# Patient Record
Sex: FEMALE | Race: WHITE | NOT HISPANIC OR LATINO | Employment: OTHER | URBAN - METROPOLITAN AREA
[De-identification: names, ages, dates, MRNs, and addresses within clinical notes are randomized per-mention and may not be internally consistent; named-entity substitution may affect disease eponyms.]

---

## 2018-02-27 ENCOUNTER — HOSPITAL ENCOUNTER (OUTPATIENT)
Dept: FAMILY MEDICINE CLINIC | Facility: CLINIC | Age: 48
Setting detail: SPECIMEN
Discharge: HOME | End: 2018-02-27
Attending: NURSE PRACTITIONER | Admitting: NURSE PRACTITIONER

## 2018-02-27 LAB
BASOPHILS # BLD AUTO: 0.1 10*3/UL (ref 0–0.2)
BASOPHILS NFR BLD AUTO: 1 % (ref 0–2)
DIFFERENTIAL METHOD BLD: (no result)
EOSINOPHIL # BLD AUTO: 0.2 10*3/UL (ref 0–0.3)
EOSINOPHIL # BLD AUTO: 2 % (ref 0–3)
ERYTHROCYTE [DISTWIDTH] IN BLOOD BY AUTOMATED COUNT: 13 % (ref 11.5–14.5)
HCT VFR BLD AUTO: 52.3 % (ref 35–49)
HGB BLD-MCNC: 18 G/DL (ref 12–15)
LYMPHOCYTES # BLD AUTO: 3.9 10*3/UL (ref 0.8–4.8)
LYMPHOCYTES NFR BLD AUTO: 34 % (ref 18–42)
MCH RBC QN AUTO: 30.3 PG (ref 26–32)
MCHC RBC AUTO-ENTMCNC: 34.3 G/DL (ref 32–36)
MCV RBC AUTO: 88.4 FL (ref 80–94)
MONOCYTES # BLD AUTO: 0.7 10*3/UL (ref 0.1–1.3)
MONOCYTES NFR BLD AUTO: 6 % (ref 2–11)
NEUTROPHILS # BLD AUTO: 6.7 10*3/UL (ref 2.3–8.6)
NEUTROPHILS NFR BLD AUTO: 57 % (ref 50–75)
NRBC BLD AUTO-RTO: 0 /100{WBCS}
NRBC/RBC NFR BLD MANUAL: 0 10*3/UL
PLATELET # BLD AUTO: 195 10*3/UL (ref 150–450)
PMV BLD AUTO: 10.3 FL (ref 7.4–10.4)
RBC # BLD AUTO: 5.92 10*6/UL (ref 4–5.4)
WBC # BLD AUTO: 11.7 10*3/UL (ref 4.5–11.5)

## 2019-02-11 ENCOUNTER — HOSPITAL ENCOUNTER (OUTPATIENT)
Dept: LAB | Facility: HOSPITAL | Age: 49
Discharge: HOME | End: 2019-02-11
Attending: FAMILY MEDICINE | Admitting: FAMILY MEDICINE

## 2019-02-11 LAB
ALBUMIN SERPL-MCNC: 3.2 G/DL (ref 3.5–4.8)
ALBUMIN/GLOB SERPL: 1 {RATIO} (ref 1–1.7)
ALP SERPL-CCNC: 63 IU/L (ref 32–91)
ALT SERPL-CCNC: 25 IU/L (ref 14–54)
ANION GAP SERPL CALC-SCNC: 15.9 MMOL/L (ref 10–20)
AST SERPL-CCNC: 30 IU/L (ref 15–41)
BASOPHILS # BLD AUTO: 0 10*3/UL (ref 0–0.2)
BASOPHILS NFR BLD AUTO: 1 % (ref 0–2)
BILIRUB SERPL-MCNC: 0.5 MG/DL (ref 0.3–1.2)
BUN SERPL-MCNC: 8 MG/DL (ref 8–20)
BUN/CREAT SERPL: 13.3 (ref 5.4–26.2)
CALCIUM SERPL-MCNC: 9.2 MG/DL (ref 8.9–10.3)
CHLORIDE SERPL-SCNC: 97 MMOL/L (ref 101–111)
CHOLEST SERPL-MCNC: 232 MG/DL
CHOLEST/HDLC SERPL: 7 {RATIO}
CONV CO2: 20 MMOL/L (ref 22–32)
CONV LDL CHOLESTEROL DIRECT: 160 MG/DL (ref 0–100)
CONV TOTAL PROTEIN: 6.4 G/DL (ref 6.1–7.9)
CREAT UR-MCNC: 0.6 MG/DL (ref 0.4–1)
DIFFERENTIAL METHOD BLD: (no result)
EOSINOPHIL # BLD AUTO: 0.2 10*3/UL (ref 0–0.3)
EOSINOPHIL # BLD AUTO: 3 % (ref 0–3)
ERYTHROCYTE [DISTWIDTH] IN BLOOD BY AUTOMATED COUNT: 12.5 % (ref 11.5–14.5)
GLOBULIN UR ELPH-MCNC: 3.2 G/DL (ref 2.5–3.8)
GLUCOSE SERPL-MCNC: 273 MG/DL (ref 65–99)
HCT VFR BLD AUTO: 49.9 % (ref 35–49)
HDLC SERPL-MCNC: 33 MG/DL
HGB BLD-MCNC: 17 G/DL (ref 12–15)
LDLC/HDLC SERPL: 4.8 {RATIO}
LIPID INTERPRETATION: ABNORMAL
LYMPHOCYTES # BLD AUTO: 3 10*3/UL (ref 0.8–4.8)
LYMPHOCYTES NFR BLD AUTO: 38 % (ref 18–42)
MCH RBC QN AUTO: 29.4 PG (ref 26–32)
MCHC RBC AUTO-ENTMCNC: 34.1 G/DL (ref 32–36)
MCV RBC AUTO: 86.2 FL (ref 80–94)
MONOCYTES # BLD AUTO: 0.3 10*3/UL (ref 0.1–1.3)
MONOCYTES NFR BLD AUTO: 4 % (ref 2–11)
NEUTROPHILS # BLD AUTO: 4.5 10*3/UL (ref 2.3–8.6)
NEUTROPHILS NFR BLD AUTO: 54 % (ref 50–75)
NRBC BLD AUTO-RTO: 0 /100{WBCS}
NRBC/RBC NFR BLD MANUAL: 0 10*3/UL
PLATELET # BLD AUTO: 178 10*3/UL (ref 150–450)
PMV BLD AUTO: 10.4 FL (ref 7.4–10.4)
POTASSIUM SERPL-SCNC: 3.9 MMOL/L (ref 3.6–5.1)
RBC # BLD AUTO: 5.79 10*6/UL (ref 4–5.4)
SODIUM SERPL-SCNC: 129 MMOL/L (ref 136–144)
TRIGL SERPL-MCNC: 272 MG/DL
VLDLC SERPL CALC-MCNC: 38.9 MG/DL
WBC # BLD AUTO: 8.1 10*3/UL (ref 4.5–11.5)

## 2019-07-19 ENCOUNTER — TELEPHONE (OUTPATIENT)
Dept: FAMILY MEDICINE CLINIC | Facility: CLINIC | Age: 49
End: 2019-07-19

## 2019-07-19 NOTE — TELEPHONE ENCOUNTER
"FROM THE PHARMACY: \"Patient requests new Rx: New Rx for basaglar, pt is injecting 28 units daily and it's too soon to fill.\"  "

## 2019-07-21 RX ORDER — CYCLOBENZAPRINE HCL 10 MG
TABLET ORAL EVERY 24 HOURS
COMMUNITY
Start: 2019-02-08 | End: 2019-08-22 | Stop reason: SDUPTHER

## 2019-07-21 RX ORDER — HYDROXYZINE HYDROCHLORIDE 25 MG/1
TABLET, FILM COATED ORAL
COMMUNITY
Start: 2018-09-13 | End: 2019-12-10 | Stop reason: SDUPTHER

## 2019-07-21 RX ORDER — ALBUTEROL SULFATE 90 UG/1
AEROSOL, METERED RESPIRATORY (INHALATION)
COMMUNITY
Start: 2018-02-27 | End: 2021-02-09

## 2019-07-21 RX ORDER — NEBIVOLOL 10 MG/1
10 TABLET ORAL DAILY
COMMUNITY
Start: 2018-02-27

## 2019-07-21 RX ORDER — QUETIAPINE FUMARATE 200 MG/1
TABLET, FILM COATED ORAL EVERY 24 HOURS
COMMUNITY
Start: 2018-09-13 | End: 2020-04-17 | Stop reason: SDUPTHER

## 2019-07-21 RX ORDER — DOCUSATE SODIUM 100 MG/1
100 CAPSULE, LIQUID FILLED ORAL DAILY
COMMUNITY
Start: 2019-02-08

## 2019-07-21 RX ORDER — OMEPRAZOLE 40 MG/1
CAPSULE, DELAYED RELEASE ORAL
Status: ON HOLD | COMMUNITY
Start: 2018-02-27 | End: 2021-02-09

## 2019-08-22 RX ORDER — CYCLOBENZAPRINE HCL 10 MG
10 TABLET ORAL 2 TIMES DAILY PRN
Qty: 60 TABLET | Refills: 2 | Status: SHIPPED | OUTPATIENT
Start: 2019-08-22 | End: 2020-01-27 | Stop reason: SDUPTHER

## 2019-12-10 RX ORDER — HYDROXYZINE HYDROCHLORIDE 25 MG/1
TABLET, FILM COATED ORAL
Qty: 60 TABLET | Refills: 2 | Status: SHIPPED | OUTPATIENT
Start: 2019-12-10

## 2020-01-28 RX ORDER — CYCLOBENZAPRINE HCL 10 MG
10 TABLET ORAL 2 TIMES DAILY PRN
Qty: 60 TABLET | Refills: 2 | Status: SHIPPED | OUTPATIENT
Start: 2020-01-28 | End: 2021-02-09

## 2020-03-20 DIAGNOSIS — E11.9 DIABETES MELLITUS WITHOUT COMPLICATION (HCC): Primary | ICD-10-CM

## 2020-04-20 RX ORDER — QUETIAPINE FUMARATE 200 MG/1
TABLET, FILM COATED ORAL
Qty: 60 TABLET | Refills: 0 | Status: ON HOLD | OUTPATIENT
Start: 2020-04-20 | End: 2021-02-09

## 2020-06-10 ENCOUNTER — HOSPITAL ENCOUNTER (EMERGENCY)
Facility: HOSPITAL | Age: 50
Discharge: HOME | End: 2020-06-10
Attending: EMERGENCY MEDICINE | Admitting: EMERGENCY MEDICINE

## 2020-06-10 VITALS
HEART RATE: 73 BPM | BODY MASS INDEX: 50.02 KG/M2 | HEIGHT: 64 IN | RESPIRATION RATE: 16 BRPM | DIASTOLIC BLOOD PRESSURE: 80 MMHG | WEIGHT: 293 LBS | SYSTOLIC BLOOD PRESSURE: 126 MMHG | TEMPERATURE: 98.1 F | OXYGEN SATURATION: 99 %

## 2020-06-10 DIAGNOSIS — R73.9 HYPERGLYCEMIA: Primary | ICD-10-CM

## 2020-06-10 LAB
ALBUMIN SERPL-MCNC: 3.6 G/DL (ref 3.5–5.2)
ALBUMIN/GLOB SERPL: 1.1 G/DL
ALP SERPL-CCNC: 71 U/L (ref 39–117)
ALT SERPL W P-5'-P-CCNC: 43 U/L (ref 1–33)
ANION GAP SERPL CALCULATED.3IONS-SCNC: 14 MMOL/L (ref 5–15)
AST SERPL-CCNC: 35 U/L (ref 1–32)
BACTERIA UR QL AUTO: ABNORMAL /HPF
BILIRUB SERPL-MCNC: 0.6 MG/DL (ref 0.2–1.2)
BILIRUB UR QL STRIP: NEGATIVE
BUN BLD-MCNC: 11 MG/DL (ref 6–20)
BUN BLD-MCNC: ABNORMAL MG/DL
BUN/CREAT SERPL: ABNORMAL
CALCIUM SPEC-SCNC: 9.4 MG/DL (ref 8.6–10.5)
CHLORIDE SERPL-SCNC: 95 MMOL/L (ref 98–107)
CLARITY UR: CLEAR
CO2 SERPL-SCNC: 21 MMOL/L (ref 22–29)
COLOR UR: YELLOW
CREAT BLD-MCNC: 0.66 MG/DL (ref 0.57–1)
DEPRECATED RDW RBC AUTO: 39.8 FL (ref 37–54)
EOSINOPHIL # BLD MANUAL: 0.25 10*3/MM3 (ref 0–0.4)
EOSINOPHIL NFR BLD MANUAL: 2 % (ref 0.3–6.2)
ERYTHROCYTE [DISTWIDTH] IN BLOOD BY AUTOMATED COUNT: 13.2 % (ref 12.3–15.4)
GFR SERPL CREATININE-BSD FRML MDRD: 94 ML/MIN/1.73
GLOBULIN UR ELPH-MCNC: 3.3 GM/DL
GLUCOSE BLD-MCNC: 346 MG/DL (ref 65–99)
GLUCOSE BLDC GLUCOMTR-MCNC: 295 MG/DL (ref 70–105)
GLUCOSE BLDC GLUCOMTR-MCNC: 300 MG/DL (ref 70–105)
GLUCOSE BLDC GLUCOMTR-MCNC: 329 MG/DL (ref 70–105)
GLUCOSE BLDC GLUCOMTR-MCNC: 337 MG/DL (ref 70–105)
GLUCOSE UR STRIP-MCNC: ABNORMAL MG/DL
HCT VFR BLD AUTO: 45.8 % (ref 34–46.6)
HGB BLD-MCNC: 16 G/DL (ref 12–15.9)
HGB UR QL STRIP.AUTO: ABNORMAL
HYALINE CASTS UR QL AUTO: ABNORMAL /LPF
KETONES UR QL STRIP: NEGATIVE
LEUKOCYTE ESTERASE UR QL STRIP.AUTO: NEGATIVE
LYMPHOCYTES # BLD MANUAL: 4.45 10*3/MM3 (ref 0.7–3.1)
LYMPHOCYTES NFR BLD MANUAL: 35 % (ref 19.6–45.3)
LYMPHOCYTES NFR BLD MANUAL: 4 % (ref 5–12)
MCH RBC QN AUTO: 30.1 PG (ref 26.6–33)
MCHC RBC AUTO-ENTMCNC: 35 G/DL (ref 31.5–35.7)
MCV RBC AUTO: 86.1 FL (ref 79–97)
MONOCYTES # BLD AUTO: 0.51 10*3/MM3 (ref 0.1–0.9)
NEUTROPHILS # BLD AUTO: 7.49 10*3/MM3 (ref 1.7–7)
NEUTROPHILS NFR BLD MANUAL: 56 % (ref 42.7–76)
NEUTS BAND NFR BLD MANUAL: 3 % (ref 0–5)
NITRITE UR QL STRIP: NEGATIVE
NT-PROBNP SERPL-MCNC: 60 PG/ML (ref 5–900)
PH UR STRIP.AUTO: 6 [PH] (ref 5–8)
PLAT MORPH BLD: NORMAL
PLATELET # BLD AUTO: 172 10*3/MM3 (ref 140–450)
PMV BLD AUTO: 9.3 FL (ref 6–12)
POTASSIUM BLD-SCNC: 3.9 MMOL/L (ref 3.5–5.2)
PROT SERPL-MCNC: 6.9 G/DL (ref 6–8.5)
PROT UR QL STRIP: NEGATIVE
RBC # BLD AUTO: 5.31 10*6/MM3 (ref 3.77–5.28)
RBC # UR: ABNORMAL /HPF
RBC MORPH BLD: NORMAL
REF LAB TEST METHOD: ABNORMAL
SCAN SLIDE: NORMAL
SODIUM BLD-SCNC: 130 MMOL/L (ref 136–145)
SP GR UR STRIP: 1.04 (ref 1–1.03)
SQUAMOUS #/AREA URNS HPF: ABNORMAL /HPF
TROPONIN T SERPL-MCNC: 0.01 NG/ML (ref 0–0.03)
TSH SERPL DL<=0.05 MIU/L-ACNC: 1.94 UIU/ML (ref 0.27–4.2)
UROBILINOGEN UR QL STRIP: ABNORMAL
WBC MORPH BLD: NORMAL
WBC NRBC COR # BLD: 12.7 10*3/MM3 (ref 3.4–10.8)
WBC UR QL AUTO: ABNORMAL /HPF

## 2020-06-10 PROCEDURE — 96360 HYDRATION IV INFUSION INIT: CPT

## 2020-06-10 PROCEDURE — 84484 ASSAY OF TROPONIN QUANT: CPT | Performed by: EMERGENCY MEDICINE

## 2020-06-10 PROCEDURE — 36415 COLL VENOUS BLD VENIPUNCTURE: CPT

## 2020-06-10 PROCEDURE — 83880 ASSAY OF NATRIURETIC PEPTIDE: CPT | Performed by: EMERGENCY MEDICINE

## 2020-06-10 PROCEDURE — 85025 COMPLETE CBC W/AUTO DIFF WBC: CPT | Performed by: EMERGENCY MEDICINE

## 2020-06-10 PROCEDURE — 82962 GLUCOSE BLOOD TEST: CPT

## 2020-06-10 PROCEDURE — 85007 BL SMEAR W/DIFF WBC COUNT: CPT | Performed by: EMERGENCY MEDICINE

## 2020-06-10 PROCEDURE — 93005 ELECTROCARDIOGRAM TRACING: CPT | Performed by: EMERGENCY MEDICINE

## 2020-06-10 PROCEDURE — 63710000001 INSULIN REGULAR HUMAN PER 5 UNITS: Performed by: EMERGENCY MEDICINE

## 2020-06-10 PROCEDURE — 81001 URINALYSIS AUTO W/SCOPE: CPT | Performed by: EMERGENCY MEDICINE

## 2020-06-10 PROCEDURE — 99284 EMERGENCY DEPT VISIT MOD MDM: CPT

## 2020-06-10 PROCEDURE — 84443 ASSAY THYROID STIM HORMONE: CPT | Performed by: EMERGENCY MEDICINE

## 2020-06-10 PROCEDURE — 80053 COMPREHEN METABOLIC PANEL: CPT | Performed by: EMERGENCY MEDICINE

## 2020-06-10 RX ORDER — INSULIN GLARGINE 100 [IU]/ML
20 INJECTION, SOLUTION SUBCUTANEOUS NIGHTLY
Qty: 2 PEN | Refills: 0 | Status: SHIPPED | OUTPATIENT
Start: 2020-06-10 | End: 2020-06-17 | Stop reason: SDUPTHER

## 2020-06-10 RX ADMIN — INSULIN HUMAN 10 UNITS: 100 INJECTION, SOLUTION PARENTERAL at 04:58

## 2020-06-10 RX ADMIN — SODIUM CHLORIDE 1000 ML: 900 INJECTION, SOLUTION INTRAVENOUS at 03:59

## 2020-06-10 NOTE — ED NOTES
Pt c/o BLE edema and hyperglycemia for several days. Pt reports she lost her PCP and she has been out of insulin x3 days. Pt states she normally takes 20 units of Braylar insulin every night.      Melva Angelo, SHELLY  06/10/20 0236

## 2020-06-10 NOTE — ED PROVIDER NOTES
Subjective   50-year-old female with diabetes complains of being out of her insulin for 3 days secondary to issues with her insurance with resultant elevated blood sugar and leg swelling.  Patient states she gets leg swelling when her blood sugar elevates.  Patient denies any vomiting, mild diarrhea, no other symptoms.  No fever, no chest pain or shortness of air.  Symptoms are mild to moderate.  Leg swelling is worse with elevated blood sugar per patient.          Review of Systems   Cardiovascular: Positive for leg swelling.   Endocrine:        Elevated blood sugar   All other systems reviewed and are negative.      No past medical history on file.    No Known Allergies    No past surgical history on file.    No family history on file.    Social History     Socioeconomic History   • Marital status: Single     Spouse name: Not on file   • Number of children: Not on file   • Years of education: Not on file   • Highest education level: Not on file           Objective   Physical Exam   Constitutional: She is oriented to person, place, and time.   Obese female in no acute distress   HENT:   Mouth/Throat: Oropharynx is clear and moist.   Eyes: Pupils are equal, round, and reactive to light. Conjunctivae are normal.   Neck: Normal range of motion. Neck supple.   Cardiovascular: Normal rate, regular rhythm, normal heart sounds and intact distal pulses.   Pulmonary/Chest: Effort normal and breath sounds normal.   Abdominal: Soft. Bowel sounds are normal. She exhibits no distension. There is no tenderness.   Musculoskeletal:   Trace pedal edema bilateral lower extremities, symmetric, no calf tenderness, negative Homans   Neurological: She is alert and oriented to person, place, and time.   Motor and sensation intact   Skin: Skin is warm and dry. Capillary refill takes less than 2 seconds.   Psychiatric: She has a normal mood and affect. Her behavior is normal.       Procedures           ED Course  ED Course as of Pop 10  0659   Wed Pop 10, 2020   0658 EKG interpretation: Normal sinus rhythm, rate 81, no acute ST elevation    [JR]      ED Course User Index  [JR] Jw Strong MD                                           MDM  Number of Diagnoses or Management Options  Hyperglycemia:   Diagnosis management comments: Results for orders placed or performed during the hospital encounter of 06/10/20  -Comprehensive Metabolic Panel       Result                      Value             Ref Range           Glucose                     346 (H)           65 - 99 mg/dL       BUN                                                               Creatinine                  0.66              0.57 - 1.00 *       Sodium                      130 (L)           136 - 145 mm*       Potassium                   3.9               3.5 - 5.2 mm*       Chloride                    95 (L)            98 - 107 mmo*       CO2                         21.0 (L)          22.0 - 29.0 *       Calcium                     9.4               8.6 - 10.5 m*       Total Protein               6.9               6.0 - 8.5 g/*       Albumin                     3.60              3.50 - 5.20 *       ALT (SGPT)                  43 (H)            1 - 33 U/L          AST (SGOT)                  35 (H)            1 - 32 U/L          Alkaline Phosphatase        71                39 - 117 U/L        Total Bilirubin             0.6               0.2 - 1.2 mg*       eGFR Non  Amer       94                >60 mL/min/1*       Globulin                    3.3               gm/dL               A/G Ratio                   1.1               g/dL                BUN/Creatinine Ratio                                              Anion Gap                   14.0              5.0 - 15.0 m*  -Troponin       Result                      Value             Ref Range           Troponin T                  0.012             0.000 - 0.03*  -BNP       Result                      Value             Ref Range            proBNP                      60.0              5.0 - 900.0 *  -Urinalysis With Microscopic If Indicated (No Culture) - Urine, Catheter       Result                      Value             Ref Range           Color, UA                   Yellow            Yellow, Straw       Appearance, UA              Clear             Clear               pH, UA                      6.0               5.0 - 8.0           Specific Gravity, UA        1.038 (H)         1.005 - 1.030       Glucose, UA                                   Negative        >=1000 mg/dL (3+) (A)       Ketones, UA                 Negative          Negative            Bilirubin, UA               Negative          Negative            Blood, UA                   Trace (A)         Negative            Protein, UA                 Negative          Negative            Leuk Esterase, UA           Negative          Negative            Nitrite, UA                 Negative          Negative            Urobilinogen, UA            0.2 E.U./dL       0.2 - 1.0 E.*  -TSH       Result                      Value             Ref Range           TSH                         1.940             0.270 - 4.20*  -CBC Auto Differential       Result                      Value             Ref Range           WBC                         12.70 (H)         3.40 - 10.80*       RBC                         5.31 (H)          3.77 - 5.28 *       Hemoglobin                  16.0 (H)          12.0 - 15.9 *       Hematocrit                  45.8              34.0 - 46.6 %       MCV                         86.1              79.0 - 97.0 *       MCH                         30.1              26.6 - 33.0 *       MCHC                        35.0              31.5 - 35.7 *       RDW                         13.2              12.3 - 15.4 %       RDW-SD                      39.8              37.0 - 54.0 *       MPV                         9.3               6.0 - 12.0 fL       Platelets                   172                140 - 450 10*  -Scan Slide       Result                      Value             Ref Range           Scan Slide                                                   -Urinalysis, Microscopic Only - Urine, Catheter       Result                      Value             Ref Range           RBC, UA                     3-5 (A)           None Seen /H*       WBC, UA                     None Seen         None Seen /H*       Bacteria, UA                None Seen         None Seen /H*       Squamous Epithelial Ce*     3-6 (A)           None Seen, 0*       Hyaline Casts, UA           3-6               None Seen /L*       Methodology                                                   Automated Microscopy  -Manual Differential       Result                      Value             Ref Range           Neutrophil %                56.0              42.7 - 76.0 %       Lymphocyte %                35.0              19.6 - 45.3 %       Monocyte %                  4.0 (L)           5.0 - 12.0 %        Eosinophil %                2.0               0.3 - 6.2 %         Bands %                     3.0               0.0 - 5.0 %         Neutrophils Absolute        7.49 (H)          1.70 - 7.00 *       Lymphocytes Absolute        4.45 (H)          0.70 - 3.10 *       Monocytes Absolute          0.51              0.10 - 0.90 *       Eosinophils Absolute        0.25              0.00 - 0.40 *       RBC Morphology              Normal            Normal              WBC Morphology              Normal            Normal              Platelet Morphology         Normal            Normal         -BUN       Result                      Value             Ref Range           BUN                         11                6 - 20 mg/dL   -POC Glucose Once       Result                      Value             Ref Range           Glucose                     329 (H)           70 - 105 mg/*  -POC Glucose Once       Result                      Value             Ref  Range           Glucose                     337 (H)           70 - 105 mg/*  -POC Glucose Once       Result                      Value             Ref Range           Glucose                     300 (H)           70 - 105 mg/*  -POC Glucose Once       Result                      Value             Ref Range           Glucose                     295 (H)           70 - 105 mg/*    Well, vital signs stable, will refill insulin.       Amount and/or Complexity of Data Reviewed  Clinical lab tests: reviewed  Tests in the medicine section of CPT®: reviewed        Final diagnoses:   Hyperglycemia            Jw Srtong MD  06/10/20 0659

## 2020-06-17 RX ORDER — INSULIN GLARGINE 100 [IU]/ML
20 INJECTION, SOLUTION SUBCUTANEOUS NIGHTLY
Qty: 15 ML | Refills: 0 | Status: SHIPPED | OUTPATIENT
Start: 2020-06-17 | End: 2020-06-17

## 2020-06-17 RX ORDER — INSULIN GLARGINE 100 [IU]/ML
20 INJECTION, SOLUTION SUBCUTANEOUS NIGHTLY
Qty: 15 ML | Refills: 0 | Status: SHIPPED | OUTPATIENT
Start: 2020-06-17 | End: 2021-02-09

## 2021-02-09 ENCOUNTER — APPOINTMENT (OUTPATIENT)
Dept: CT IMAGING | Facility: HOSPITAL | Age: 51
End: 2021-02-09

## 2021-02-09 ENCOUNTER — APPOINTMENT (OUTPATIENT)
Dept: GENERAL RADIOLOGY | Facility: HOSPITAL | Age: 51
End: 2021-02-09

## 2021-02-09 ENCOUNTER — HOSPITAL ENCOUNTER (OUTPATIENT)
Facility: HOSPITAL | Age: 51
Discharge: HOME | End: 2021-02-11
Attending: INTERNAL MEDICINE | Admitting: ORTHOPAEDIC SURGERY

## 2021-02-09 DIAGNOSIS — S42.202A CLOSED FRACTURE OF PROXIMAL END OF LEFT HUMERUS, UNSPECIFIED FRACTURE MORPHOLOGY, INITIAL ENCOUNTER: Primary | ICD-10-CM

## 2021-02-09 DIAGNOSIS — R55 NEAR SYNCOPE: ICD-10-CM

## 2021-02-09 DIAGNOSIS — R56.9 SEIZURE (HCC): ICD-10-CM

## 2021-02-09 DIAGNOSIS — M79.602 PAIN OF LEFT UPPER EXTREMITY: ICD-10-CM

## 2021-02-09 DIAGNOSIS — S42.212A CLOSED DISPLACED FRACTURE OF SURGICAL NECK OF LEFT HUMERUS, UNSPECIFIED FRACTURE MORPHOLOGY, INITIAL ENCOUNTER: ICD-10-CM
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
## 2021-02-09 LAB
ALBUMIN SERPL-MCNC: 3.7 G/DL (ref 3.5–5.2)
ALBUMIN/GLOB SERPL: 1.5 G/DL
ALP SERPL-CCNC: 78 U/L (ref 39–117)
ALT SERPL W P-5'-P-CCNC: 18 U/L (ref 1–33)
ANION GAP SERPL CALCULATED.3IONS-SCNC: 12 MMOL/L (ref 5–15)
AST SERPL-CCNC: 20 U/L (ref 1–32)
BASOPHILS # BLD AUTO: 0.1 10*3/MM3 (ref 0–0.2)
BASOPHILS NFR BLD AUTO: 1.2 % (ref 0–1.5)
BILIRUB SERPL-MCNC: 0.4 MG/DL (ref 0–1.2)
BUN SERPL-MCNC: 9 MG/DL (ref 6–20)
BUN/CREAT SERPL: 12.9 (ref 7–25)
CALCIUM SPEC-SCNC: 9.7 MG/DL (ref 8.6–10.5)
CHLORIDE SERPL-SCNC: 97 MMOL/L (ref 98–107)
CO2 SERPL-SCNC: 25 MMOL/L (ref 22–29)
CREAT SERPL-MCNC: 0.7 MG/DL (ref 0.57–1)
DEPRECATED RDW RBC AUTO: 37.2 FL (ref 37–54)
EOSINOPHIL # BLD AUTO: 0.2 10*3/MM3 (ref 0–0.4)
EOSINOPHIL NFR BLD AUTO: 2 % (ref 0.3–6.2)
ERYTHROCYTE [DISTWIDTH] IN BLOOD BY AUTOMATED COUNT: 12.4 % (ref 12.3–15.4)
GFR SERPL CREATININE-BSD FRML MDRD: 88 ML/MIN/1.73
GLOBULIN UR ELPH-MCNC: 2.5 GM/DL
GLUCOSE BLDC GLUCOMTR-MCNC: 306 MG/DL (ref 70–105)
GLUCOSE BLDC GLUCOMTR-MCNC: 323 MG/DL (ref 70–105)
GLUCOSE BLDC GLUCOMTR-MCNC: 328 MG/DL (ref 70–105)
GLUCOSE BLDC GLUCOMTR-MCNC: 367 MG/DL (ref 70–105)
GLUCOSE SERPL-MCNC: 321 MG/DL (ref 65–99)
HBA1C MFR BLD: 11.4 % (ref 3.5–5.6)
HCT VFR BLD AUTO: 43.2 % (ref 34–46.6)
HGB BLD-MCNC: 14.7 G/DL (ref 12–15.9)
HOLD SPECIMEN: NORMAL
HOLD SPECIMEN: NORMAL
INR PPP: 0.98 (ref 0.93–1.1)
LYMPHOCYTES # BLD AUTO: 2.9 10*3/MM3 (ref 0.7–3.1)
LYMPHOCYTES NFR BLD AUTO: 31.7 % (ref 19.6–45.3)
MAGNESIUM SERPL-MCNC: 1.6 MG/DL (ref 1.6–2.6)
MCH RBC QN AUTO: 29.5 PG (ref 26.6–33)
MCHC RBC AUTO-ENTMCNC: 33.9 G/DL (ref 31.5–35.7)
MCV RBC AUTO: 86.8 FL (ref 79–97)
MONOCYTES # BLD AUTO: 0.6 10*3/MM3 (ref 0.1–0.9)
MONOCYTES NFR BLD AUTO: 6.9 % (ref 5–12)
MRSA DNA SPEC QL NAA+PROBE: NORMAL
NEUTROPHILS NFR BLD AUTO: 5.4 10*3/MM3 (ref 1.7–7)
NEUTROPHILS NFR BLD AUTO: 58.2 % (ref 42.7–76)
NRBC BLD AUTO-RTO: 0.1 /100 WBC (ref 0–0.2)
PLATELET # BLD AUTO: 206 10*3/MM3 (ref 140–450)
PMV BLD AUTO: 9.7 FL (ref 6–12)
POTASSIUM SERPL-SCNC: 4 MMOL/L (ref 3.5–5.2)
PROT SERPL-MCNC: 6.2 G/DL (ref 6–8.5)
PROTHROMBIN TIME: 10.8 SECONDS (ref 9.6–11.7)
QT INTERVAL: 379 MS
RBC # BLD AUTO: 4.97 10*6/MM3 (ref 3.77–5.28)
SARS-COV-2 RNA PNL SPEC NAA+PROBE: NOT DETECTED
SODIUM SERPL-SCNC: 134 MMOL/L (ref 136–145)
TROPONIN T SERPL-MCNC: <0.01 NG/ML (ref 0–0.03)
WBC # BLD AUTO: 9.3 10*3/MM3 (ref 3.4–10.8)
WHOLE BLOOD HOLD SPECIMEN: NORMAL

## 2021-02-09 PROCEDURE — 82962 GLUCOSE BLOOD TEST: CPT

## 2021-02-09 PROCEDURE — 93005 ELECTROCARDIOGRAM TRACING: CPT | Performed by: PHYSICIAN ASSISTANT

## 2021-02-09 PROCEDURE — 83036 HEMOGLOBIN GLYCOSYLATED A1C: CPT | Performed by: NURSE PRACTITIONER

## 2021-02-09 PROCEDURE — C9803 HOPD COVID-19 SPEC COLLECT: HCPCS

## 2021-02-09 PROCEDURE — 99219 PR INITIAL OBSERVATION CARE/DAY 50 MINUTES: CPT | Performed by: INTERNAL MEDICINE

## 2021-02-09 PROCEDURE — 96365 THER/PROPH/DIAG IV INF INIT: CPT

## 2021-02-09 PROCEDURE — 96374 THER/PROPH/DIAG INJ IV PUSH: CPT

## 2021-02-09 PROCEDURE — 73060 X-RAY EXAM OF HUMERUS: CPT

## 2021-02-09 PROCEDURE — 73200 CT UPPER EXTREMITY W/O DYE: CPT

## 2021-02-09 PROCEDURE — 80053 COMPREHEN METABOLIC PANEL: CPT | Performed by: PHYSICIAN ASSISTANT

## 2021-02-09 PROCEDURE — 73030 X-RAY EXAM OF SHOULDER: CPT

## 2021-02-09 PROCEDURE — 63710000001 INSULIN REGULAR HUMAN PER 5 UNITS: Performed by: PHYSICIAN ASSISTANT

## 2021-02-09 PROCEDURE — 83735 ASSAY OF MAGNESIUM: CPT | Performed by: PHYSICIAN ASSISTANT

## 2021-02-09 PROCEDURE — 87641 MR-STAPH DNA AMP PROBE: CPT | Performed by: ORTHOPAEDIC SURGERY

## 2021-02-09 PROCEDURE — 72125 CT NECK SPINE W/O DYE: CPT

## 2021-02-09 PROCEDURE — 96375 TX/PRO/DX INJ NEW DRUG ADDON: CPT

## 2021-02-09 PROCEDURE — G0378 HOSPITAL OBSERVATION PER HR: HCPCS

## 2021-02-09 PROCEDURE — 94799 UNLISTED PULMONARY SVC/PX: CPT

## 2021-02-09 PROCEDURE — 70450 CT HEAD/BRAIN W/O DYE: CPT

## 2021-02-09 PROCEDURE — 73080 X-RAY EXAM OF ELBOW: CPT

## 2021-02-09 PROCEDURE — 63710000001 INSULIN GLARGINE PER 5 UNITS: Performed by: NURSE PRACTITIONER

## 2021-02-09 PROCEDURE — 25010000002 MORPHINE PER 10 MG: Performed by: PHYSICIAN ASSISTANT

## 2021-02-09 PROCEDURE — 25010000002 MAGNESIUM SULFATE IN D5W 1G/100ML (PREMIX) 1-5 GM/100ML-% SOLUTION: Performed by: NURSE PRACTITIONER

## 2021-02-09 PROCEDURE — U0003 INFECTIOUS AGENT DETECTION BY NUCLEIC ACID (DNA OR RNA); SEVERE ACUTE RESPIRATORY SYNDROME CORONAVIRUS 2 (SARS-COV-2) (CORONAVIRUS DISEASE [COVID-19]), AMPLIFIED PROBE TECHNIQUE, MAKING USE OF HIGH THROUGHPUT TECHNOLOGIES AS DESCRIBED BY CMS-2020-01-R: HCPCS | Performed by: ORTHOPAEDIC SURGERY

## 2021-02-09 PROCEDURE — 99285 EMERGENCY DEPT VISIT HI MDM: CPT

## 2021-02-09 PROCEDURE — 25010000002 ONDANSETRON PER 1 MG: Performed by: PHYSICIAN ASSISTANT

## 2021-02-09 PROCEDURE — 71045 X-RAY EXAM CHEST 1 VIEW: CPT

## 2021-02-09 PROCEDURE — 85025 COMPLETE CBC W/AUTO DIFF WBC: CPT | Performed by: PHYSICIAN ASSISTANT

## 2021-02-09 PROCEDURE — 63710000001 INSULIN LISPRO (HUMAN) PER 5 UNITS: Performed by: NURSE PRACTITIONER

## 2021-02-09 PROCEDURE — 94640 AIRWAY INHALATION TREATMENT: CPT

## 2021-02-09 PROCEDURE — 84484 ASSAY OF TROPONIN QUANT: CPT | Performed by: PHYSICIAN ASSISTANT

## 2021-02-09 PROCEDURE — 85610 PROTHROMBIN TIME: CPT | Performed by: PHYSICIAN ASSISTANT

## 2021-02-09 RX ORDER — MAGNESIUM SULFATE 1 G/100ML
1 INJECTION INTRAVENOUS AS NEEDED
Status: DISCONTINUED | OUTPATIENT
Start: 2021-02-09 | End: 2021-02-10

## 2021-02-09 RX ORDER — ACETAMINOPHEN 325 MG/1
650 TABLET ORAL EVERY 4 HOURS PRN
Status: DISCONTINUED | OUTPATIENT
Start: 2021-02-09 | End: 2021-02-10

## 2021-02-09 RX ORDER — GABAPENTIN 100 MG/1
100 CAPSULE ORAL
COMMUNITY

## 2021-02-09 RX ORDER — NICOTINE 21 MG/24HR
1 PATCH, TRANSDERMAL 24 HOURS TRANSDERMAL
Status: DISCONTINUED | OUTPATIENT
Start: 2021-02-09 | End: 2021-02-10

## 2021-02-09 RX ORDER — MORPHINE SULFATE 4 MG/ML
4 INJECTION, SOLUTION INTRAMUSCULAR; INTRAVENOUS ONCE
Status: COMPLETED | OUTPATIENT
Start: 2021-02-09 | End: 2021-02-09

## 2021-02-09 RX ORDER — INSULIN GLARGINE 100 [IU]/ML
22 INJECTION, SOLUTION SUBCUTANEOUS NIGHTLY
COMMUNITY

## 2021-02-09 RX ORDER — ACETAMINOPHEN 650 MG/1
650 SUPPOSITORY RECTAL EVERY 4 HOURS PRN
Status: DISCONTINUED | OUTPATIENT
Start: 2021-02-09 | End: 2021-02-10

## 2021-02-09 RX ORDER — ONDANSETRON 2 MG/ML
4 INJECTION INTRAMUSCULAR; INTRAVENOUS ONCE
Status: COMPLETED | OUTPATIENT
Start: 2021-02-09 | End: 2021-02-09

## 2021-02-09 RX ORDER — INSULIN GLARGINE 100 [IU]/ML
22 INJECTION, SOLUTION SUBCUTANEOUS NIGHTLY
Status: DISCONTINUED | OUTPATIENT
Start: 2021-02-09 | End: 2021-02-10

## 2021-02-09 RX ORDER — SODIUM CHLORIDE 0.9 % (FLUSH) 0.9 %
10 SYRINGE (ML) INJECTION AS NEEDED
Status: DISCONTINUED | OUTPATIENT
Start: 2021-02-09 | End: 2021-02-10

## 2021-02-09 RX ORDER — QUETIAPINE FUMARATE 100 MG/1
200 TABLET, FILM COATED ORAL NIGHTLY
Status: DISCONTINUED | OUTPATIENT
Start: 2021-02-09 | End: 2021-02-10

## 2021-02-09 RX ORDER — BUDESONIDE AND FORMOTEROL FUMARATE DIHYDRATE 80; 4.5 UG/1; UG/1
2 AEROSOL RESPIRATORY (INHALATION)
Status: DISCONTINUED | OUTPATIENT
Start: 2021-02-09 | End: 2021-02-10

## 2021-02-09 RX ORDER — DEXTROSE MONOHYDRATE 25 G/50ML
25 INJECTION, SOLUTION INTRAVENOUS
Status: DISCONTINUED | OUTPATIENT
Start: 2021-02-09 | End: 2021-02-10

## 2021-02-09 RX ORDER — SODIUM CHLORIDE 0.9 % (FLUSH) 0.9 %
10 SYRINGE (ML) INJECTION EVERY 12 HOURS SCHEDULED
Status: DISCONTINUED | OUTPATIENT
Start: 2021-02-09 | End: 2021-02-10

## 2021-02-09 RX ORDER — DOCUSATE SODIUM 100 MG/1
100 CAPSULE, LIQUID FILLED ORAL DAILY
Status: DISCONTINUED | OUTPATIENT
Start: 2021-02-09 | End: 2021-02-10

## 2021-02-09 RX ORDER — INSULIN GLARGINE 100 [IU]/ML
22 INJECTION, SOLUTION SUBCUTANEOUS NIGHTLY
Status: DISCONTINUED | OUTPATIENT
Start: 2021-02-09 | End: 2021-02-09

## 2021-02-09 RX ORDER — POTASSIUM CHLORIDE 20 MEQ/1
40 TABLET, EXTENDED RELEASE ORAL AS NEEDED
Status: DISCONTINUED | OUTPATIENT
Start: 2021-02-09 | End: 2021-02-10

## 2021-02-09 RX ORDER — NEBIVOLOL 10 MG/1
10 TABLET ORAL DAILY
Status: DISCONTINUED | OUTPATIENT
Start: 2021-02-09 | End: 2021-02-10

## 2021-02-09 RX ORDER — HYDROXYZINE HYDROCHLORIDE 25 MG/1
25 TABLET, FILM COATED ORAL 2 TIMES DAILY PRN
Status: DISCONTINUED | OUTPATIENT
Start: 2021-02-09 | End: 2021-02-10

## 2021-02-09 RX ORDER — ONDANSETRON 2 MG/ML
4 INJECTION INTRAMUSCULAR; INTRAVENOUS EVERY 6 HOURS PRN
Status: DISCONTINUED | OUTPATIENT
Start: 2021-02-09 | End: 2021-02-10

## 2021-02-09 RX ORDER — NYSTATIN 100000 [USP'U]/G
POWDER TOPICAL EVERY 12 HOURS SCHEDULED
Status: DISCONTINUED | OUTPATIENT
Start: 2021-02-09 | End: 2021-02-10

## 2021-02-09 RX ORDER — ACETAMINOPHEN 160 MG/5ML
650 SOLUTION ORAL EVERY 4 HOURS PRN
Status: DISCONTINUED | OUTPATIENT
Start: 2021-02-09 | End: 2021-02-10

## 2021-02-09 RX ORDER — NICOTINE POLACRILEX 4 MG
15 LOZENGE BUCCAL
Status: DISCONTINUED | OUTPATIENT
Start: 2021-02-09 | End: 2021-02-10

## 2021-02-09 RX ORDER — GABAPENTIN 100 MG/1
100 CAPSULE ORAL NIGHTLY
Status: DISCONTINUED | OUTPATIENT
Start: 2021-02-09 | End: 2021-02-11 | Stop reason: HOSPADM

## 2021-02-09 RX ORDER — ONDANSETRON 4 MG/1
4 TABLET, FILM COATED ORAL EVERY 6 HOURS PRN
Status: DISCONTINUED | OUTPATIENT
Start: 2021-02-09 | End: 2021-02-10

## 2021-02-09 RX ORDER — POTASSIUM CHLORIDE 1.5 G/1.77G
40 POWDER, FOR SOLUTION ORAL AS NEEDED
Status: DISCONTINUED | OUTPATIENT
Start: 2021-02-09 | End: 2021-02-10

## 2021-02-09 RX ORDER — INSULIN LISPRO 100 [IU]/ML
0-7 INJECTION, SOLUTION INTRAVENOUS; SUBCUTANEOUS
Status: DISCONTINUED | OUTPATIENT
Start: 2021-02-09 | End: 2021-02-10

## 2021-02-09 RX ORDER — HYDROCODONE BITARTRATE AND ACETAMINOPHEN 7.5; 325 MG/1; MG/1
1 TABLET ORAL EVERY 6 HOURS PRN
Status: DISCONTINUED | OUTPATIENT
Start: 2021-02-09 | End: 2021-02-10

## 2021-02-09 RX ORDER — INSULIN LISPRO 100 [IU]/ML
0-7 INJECTION, SOLUTION INTRAVENOUS; SUBCUTANEOUS AS NEEDED
Status: DISCONTINUED | OUTPATIENT
Start: 2021-02-09 | End: 2021-02-10

## 2021-02-09 RX ORDER — QUETIAPINE FUMARATE 200 MG/1
200 TABLET, FILM COATED ORAL NIGHTLY
COMMUNITY

## 2021-02-09 RX ORDER — MAGNESIUM SULFATE HEPTAHYDRATE 40 MG/ML
2 INJECTION, SOLUTION INTRAVENOUS AS NEEDED
Status: DISCONTINUED | OUTPATIENT
Start: 2021-02-09 | End: 2021-02-10

## 2021-02-09 RX ORDER — ALUMINA, MAGNESIA, AND SIMETHICONE 2400; 2400; 240 MG/30ML; MG/30ML; MG/30ML
15 SUSPENSION ORAL EVERY 6 HOURS PRN
Status: DISCONTINUED | OUTPATIENT
Start: 2021-02-09 | End: 2021-02-10

## 2021-02-09 RX ADMIN — DOCUSATE SODIUM 100 MG: 100 CAPSULE, LIQUID FILLED ORAL at 15:37

## 2021-02-09 RX ADMIN — INSULIN HUMAN 4 UNITS: 100 INJECTION, SOLUTION PARENTERAL at 12:54

## 2021-02-09 RX ADMIN — GABAPENTIN 100 MG: 100 CAPSULE ORAL at 21:18

## 2021-02-09 RX ADMIN — MAGNESIUM SULFATE HEPTAHYDRATE 1 G: 1 INJECTION, SOLUTION INTRAVENOUS at 17:17

## 2021-02-09 RX ADMIN — NEBIVOLOL HYDROCHLORIDE 10 MG: 5 TABLET ORAL at 15:37

## 2021-02-09 RX ADMIN — Medication 1 PATCH: at 15:37

## 2021-02-09 RX ADMIN — BUDESONIDE AND FORMOTEROL FUMARATE DIHYDRATE 2 PUFF: 80; 4.5 AEROSOL RESPIRATORY (INHALATION) at 18:43

## 2021-02-09 RX ADMIN — MORPHINE SULFATE 4 MG: 4 INJECTION INTRAVENOUS at 11:33

## 2021-02-09 RX ADMIN — INSULIN GLARGINE 22 UNITS: 100 INJECTION, SOLUTION SUBCUTANEOUS at 15:38

## 2021-02-09 RX ADMIN — INSULIN LISPRO 6 UNITS: 100 INJECTION, SOLUTION INTRAVENOUS; SUBCUTANEOUS at 17:17

## 2021-02-09 RX ADMIN — ONDANSETRON 4 MG: 2 INJECTION, SOLUTION INTRAMUSCULAR; INTRAVENOUS at 11:33

## 2021-02-09 RX ADMIN — HYDROCODONE BITARTRATE AND ACETAMINOPHEN 1 TABLET: 7.5; 325 TABLET ORAL at 22:05

## 2021-02-09 RX ADMIN — HYDROXYZINE HYDROCHLORIDE 25 MG: 25 TABLET, FILM COATED ORAL at 21:18

## 2021-02-09 RX ADMIN — NYSTATIN: 100000 POWDER TOPICAL at 21:18

## 2021-02-09 RX ADMIN — HYDROCODONE BITARTRATE AND ACETAMINOPHEN 1 TABLET: 7.5; 325 TABLET ORAL at 16:03

## 2021-02-09 RX ADMIN — QUETIAPINE FUMARATE 200 MG: 100 TABLET ORAL at 21:18

## 2021-02-09 RX ADMIN — INSULIN LISPRO 5 UNITS: 100 INJECTION, SOLUTION INTRAVENOUS; SUBCUTANEOUS at 15:38

## 2021-02-09 RX ADMIN — Medication 10 ML: at 21:18

## 2021-02-09 NOTE — ED NOTES
Alerted PA to the patients blood sugar being 328.  Patient states that she takes metformin.  Will continue to monitor.     Trice Arellano RN  02/09/21 1662

## 2021-02-09 NOTE — ED NOTES
Patient states that she fell backwards last night and she landed on the left side of her body, she said she hit elbow than her left shoulder and her head.  Patient states that when it happened she took Ibuprofen and Iced it last night, this morning she woke up and she is unable to move it.  Upon inspection there is a large bruise on her left bicep, tender to the touch unable to move her shoulder or elbow, and she is feeling numbness in her hand.  Patient is able to move her hand and fingers WNL, pulse and perfusion in her left radial and fingers WNL.     Trice Arellano, RN  02/09/21 1038

## 2021-02-09 NOTE — PLAN OF CARE
Problem: Adult Inpatient Plan of Care  Goal: Plan of Care Review  Outcome: Ongoing, Progressing  Flowsheets (Taken 2/9/2021 1431)  Progress: no change  Plan of Care Reviewed With: patient  Outcome Summary: new admit  Goal: Patient-Specific Goal (Individualized)  Outcome: Ongoing, Progressing  Goal: Absence of Hospital-Acquired Illness or Injury  Outcome: Ongoing, Progressing  Goal: Optimal Comfort and Wellbeing  Outcome: Ongoing, Progressing  Goal: Readiness for Transition of Care  Outcome: Ongoing, Progressing   Goal Outcome Evaluation:  Plan of Care Reviewed With: patient  Progress: no change  Outcome Summary: new admit

## 2021-02-09 NOTE — H&P
"      [facility] Medicine Services      Patient Name: Serina Powell  : 1970  MRN: [id number]  Primary Care Physician: Latoya, No Known  Date of admission: 2021    Patient Care Team:  Provider, No Known as PCP - General          Subjective   History Present Illness     Chief Complaint:   Chief Complaint   Patient presents with   • Shoulder Pain       Ms. Powell is a 50 y.o. female with past medical history of cardiovascular syncope/seizures, diabetes mellitus type 2 with peripheral neuropathy, HTN, COPD with continued tobacco abuse, anxiety, chronic back pain, and GERD who presented to [facility] ED 2021 with complaints of left shoulder pain after a fall at home.  He stated she slipped on something with her sock at home and fell hitting the back of her head, her left shoulder, and her left elbow.  She stated she then had a seizure.  She lied on the floor until her seizure passed.  She stated her seizures are not typical seizures and she is awake when they occur. Her seizures vary each time and different parts of her body shake. She cannot describe these \"seizures\" very well. She stated she did not pass out. She is not on seizure medications. She denied any chest pain, cough, or shortness of breath.  She stated she went to bed last night after her fall and tried to sleep off her pain but the pain persisted this morning and she called EMS to bring her to the ER.    In the ER the patient had CT head that showed no acute intracranial hemorrhage or mass-effect.  CT C-spine without evidence of acute fracture.  X-ray left shoulder showed comminuted displaced fracture of the proximal humerus.  Soft tissue swelling posterior to the elbow without radiographic evidence of acute fracture dislocation of the elbow.  Mild DJD at the glenohumeral joint and acromioclavicular joint.  CT left upper extremity showed comminuted displaced fracture proximal humerus.  Lobulated calcification " adjacent to the greater tuberosity fracture fragment, most suspicious for calcific rotator cuff tendinopathy.  Large glenohumeral joint effusion with small intra-articular calcifications.  Her glucose was 321 and she was given 4 units regular insulin.  She stated she did not take her Lantus last night. Dr. Alejandrina martins was called and recommended sling placement, plans for surgery in the am. She was admitted to the hospitalist group.          Review of Systems   Constitution: Negative.   HENT: Negative.    Eyes: Negative.    Cardiovascular: Negative.    Respiratory: Negative.    Endocrine: Negative.    Hematologic/Lymphatic: Negative.    Skin: Negative.    Musculoskeletal: Positive for joint pain.   Gastrointestinal: Negative.    Genitourinary: Negative.    Neurological: Negative.    Psychiatric/Behavioral: Negative.    Allergic/Immunologic: Negative.    All other systems reviewed and are negative.          Personal History     Past Medical History:   Past Medical History:   Diagnosis Date   • Diabetes mellitus (CMS/HCC)    • Injury of back    • Syncope without other cardiovascular symptoms        Surgical History:    History reviewed. No pertinent surgical history.        Family History: Family history is unknown by patient. She is adopted.  Social History:  reports that she has been smoking cigarettes. She has been smoking about 0.50 packs per day. She has never used smokeless tobacco. She reports that she does not drink alcohol or use drugs.      Medications:  Prior to Admission medications    Medication Sig Start Date End Date Taking? Authorizing Provider   docusate sodium (COLACE) 100 MG capsule Take 100 mg by mouth Daily. 2/8/19  Yes Casandra Klein MD   fluticasone-salmeterol (ADVAIR DISKUS) 250-50 MCG/DOSE DISKUS Inhale 1 puff 2 (Two) Times a Day. 2/27/18  Yes Casandra Klein MD   gabapentin (NEURONTIN) 100 MG capsule Take 100 mg by mouth every night at bedtime.   Yes Casandra Klein MD    hydrOXYzine (ATARAX) 25 MG tablet Take one tablet my mouth twice a day as needed for anxiety 12/10/19  Yes Swati Hoffman APRN   Insulin Glargine (BASAGLAR KWIKPEN) 100 UNIT/ML injection pen Inject 22 Units under the skin into the appropriate area as directed Every Night.   Yes ProviderCasandra MD   metFORMIN (GLUCOPHAGE) 1000 MG tablet Take 1,000 mg by mouth 2 (Two) Times a Day.   Yes Provider, MD Casandra   nebivolol (BYSTOLIC) 10 MG tablet Take 10 mg by mouth Daily. 2/27/18  Yes ProviderCasandra MD   QUEtiapine (SEROquel) 200 MG tablet Take 200 mg by mouth Every Night.   Yes ProviderCasandra MD   albuterol sulfate HFA (Ventolin HFA) 108 (90 Base) MCG/ACT inhaler  2/27/18 2/9/21  ProviderCasandra MD   cyclobenzaprine (FLEXERIL) 10 MG tablet Take 1 tablet by mouth 2 (Two) Times a Day As Needed for Muscle Spasms. 1/28/20 2/9/21  Swati Hoffman APRN   Insulin Glargine (BASAGLAR KWIKPEN) 100 UNIT/ML injection pen Inject 20 Units under the skin into the appropriate area as directed Every Night. 6/17/20 2/9/21  Swati Hoffman APRN   Insulin Pen Needle (1ST TIER UNIFINE PENTIPS) 29G X 12MM misc 1ST TIER UNIFINE PENTIPS 29G X 12MM 2/27/18 2/9/21  ProviderCasandra MD   omeprazole (priLOSEC) 40 MG capsule OMEPRAZOLE 40 MG CPDR 2/27/18 2/9/21  ProviderCasandra MD   QUEtiapine (SEROquel) 200 MG tablet TAKE ONE-HALF TO ONE TABLET BY MOUTH EVERY NIGHT AT BEDTIME. 4/20/20 2/9/21  Swati Hoffman APRN   silver sulfadiazine (SILVADENE) 1 % cream SILVADENE 1 % CREA 2/6/19 2/9/21  Provider, MD Casandra       Allergies:    Allergies   Allergen Reactions   • Naproxen Sodium Hives   • Tylenol With Codeine #3 [Acetaminophen-Codeine] Hives       Objective   Objective     Vital Signs  Temp:  [98.1 °F (36.7 °C)] 98.1 °F (36.7 °C)  Heart Rate:  [69-86] 77  Resp:  [18] 18  BP: (109-166)/(59-82) 109/62  SpO2:  [96 %-98 %] 98 %  on   ;   Device (Oxygen Therapy): room air  Body mass index is 60.54  kg/m².    Physical Exam  Vitals signs and nursing note reviewed.   Constitutional:       Appearance: She is obese.   HENT:      Head: Normocephalic.   Cardiovascular:      Rate and Rhythm: Normal rate and regular rhythm.      Pulses: Normal pulses.      Heart sounds: Normal heart sounds.   Pulmonary:      Effort: Pulmonary effort is normal.      Breath sounds: Normal breath sounds.   Abdominal:      General: Bowel sounds are normal.      Palpations: Abdomen is soft.   Musculoskeletal:      Comments: Left upper extremity not tested due to fracture  All other extremities with normal ROM   Skin:     General: Skin is warm.   Neurological:      Mental Status: She is alert and oriented to person, place, and time.   Psychiatric:         Mood and Affect: Mood normal.         Behavior: Behavior normal.       Results Review:  I have personally reviewed most recent cardiac tracings, lab results and radiology images and interpretations and agree with findings    Results from last 7 days   Lab Units 02/09/21  1132   WBC 10*3/mm3 9.30   HEMOGLOBIN g/dL 14.7   HEMATOCRIT % 43.2   PLATELETS 10*3/mm3 206   INR  0.98     Results from last 7 days   Lab Units 02/09/21  1132   SODIUM mmol/L 134*   POTASSIUM mmol/L 4.0   CHLORIDE mmol/L 97*   CO2 mmol/L 25.0   BUN mg/dL 9   CREATININE mg/dL 0.70   GLUCOSE mg/dL 321*   CALCIUM mg/dL 9.7   ALT (SGPT) U/L 18   AST (SGOT) U/L 20   TROPONIN T ng/mL <0.010     Estimated Creatinine Clearance: 133.7 mL/min (by C-G formula based on SCr of 0.7 mg/dL).  Brief Urine Lab Results  (Last result in the past 365 days)      Color   Clarity   Blood   Leuk Est   Nitrite   Protein   CREAT   Urine HCG        06/10/20 0258 Yellow Clear Trace Negative Negative Negative               Microbiology Results (last 10 days)     ** No results found for the last 240 hours. **          ECG/EMG Results (most recent)     Procedure Component Value Units Date/Time    ECG 12 Lead [[id number]] Collected: 02/09/21 1135      Updated: 02/09/21 1137     QT Interval 379 ms     Narrative:      HEART RATE= 75  bpm  RR Interval= 800  ms  TX Interval= 201  ms  P Horizontal Axis= -19  deg  P Front Axis= 58  deg  QRSD Interval= 78  ms  QT Interval= 379  ms  QRS Axis= 32  deg  T Wave Axis= 47  deg  - OTHERWISE NORMAL ECG -  Sinus rhythm  Low voltage, precordial leads  When compared with ECG of 10-Pop-2020 3:02:06,  Significant axis, voltage or hypertrophy change  Electronically Signed By:   Date and Time of Study: 2021-02-09 11:35:34    ECG 12 Lead [[id number]] Resulted: 02/09/21 1333     Updated: 02/09/21 1333                    Xr Shoulder 2+ View Left    Result Date: 2/9/2021   1. Comminuted displaced fracture of the proximal humerus, as above. 2. Mild DJD at the glenohumeral joint and acromioclavicular joint. 3. Soft tissue swelling posterior to the elbow without radiographic evidence of acute fracture or dislocation of the elbow. 4. Small lateral epicondyle enthesophyte.  Electronically Signed By-Matthew Gomez MD On:2/9/2021 11:37 AM This report was finalized on [id number] by  Matthew Gomez MD.    Xr Humerus Left    Result Date: 2/9/2021   1. Comminuted displaced fracture of the proximal humerus, as above. 2. Mild DJD at the glenohumeral joint and acromioclavicular joint. 3. Soft tissue swelling posterior to the elbow without radiographic evidence of acute fracture or dislocation of the elbow. 4. Small lateral epicondyle enthesophyte.  Electronically Signed By-Matthew Gomez MD On:2/9/2021 11:37 AM This report was finalized on [id number] by  Matthew Gomez MD.    Xr Elbow 3+ View Left    Result Date: 2/9/2021   1. Comminuted displaced fracture of the proximal humerus, as above. 2. Mild DJD at the glenohumeral joint and acromioclavicular joint. 3. Soft tissue swelling posterior to the elbow without radiographic evidence of acute fracture or dislocation of the elbow. 4. Small lateral epicondyle enthesophyte.  Electronically Signed By-Matthew  MD Jason On:2/9/2021 11:37 AM This report was finalized on [id number] by  Matthew Gomez MD.    Ct Head Without Contrast    Result Date: 2/9/2021  No acute intracranial hemorrhage or mass/mass effect.  Electronically Signed By-Matthew Gomez MD On:2/9/2021 1:20 PM This report was finalized on [id number] by  Matthew Gomez MD.    Ct Cervical Spine Without Contrast    Result Date: 2/9/2021  1. No evidence of acute fracture or traumatic subluxation. 2. Degenerative disc disease most notably at C5-C6 and C6-C7 without evidence of significant spinal canal stenosis or neuroforaminal narrowing.  Electronically Signed By-Robles Douglas MD On:2/9/2021 12:14 PM This report was finalized on [id number] by  Robles Douglas MD.    Xr Chest 1 View    Result Date: 2/9/2021  1. No acute cardiopulmonary abnormality.  Electronically Signed By-Robles Douglas MD On:2/9/2021 11:37 AM This report was finalized on [id number] by  Robles Douglas MD.    Ct Upper Extremity Left Without Contrast    Result Date: 2/9/2021   1. Comminuted displaced fracture of proximal humerus, as above. 2. Lobulated calcification adjacent to the greater tuberosity fracture fragment, most suspicious for calcific rotator cuff tendinopathy. 3. Large glenohumeral joint effusion with small intra-articular calcifications. 4. Mild age-appropriate DJD at the AC joint.  Electronically Signed By-Matthew Gomez MD On:2/9/2021 12:36 PM This report was finalized on [id number] by  Matthew Gomez MD.        Estimated Creatinine Clearance: 133.7 mL/min (by C-G formula based on SCr of 0.7 mg/dL).    Assessment/Plan   Assessment/Plan       Active Hospital Problems    Diagnosis  POA   • **Closed fracture of proximal end of left humerus [S42.202A]  Yes   • Fall [W19.XXXA]  Yes     Priority: High   • Diabetes mellitus (CMS/HCC) [E11.9]  Yes   • Syncope without other cardiovascular symptoms [R55, R09.89]  Yes   • COPD (chronic obstructive pulmonary disease)  (CMS/[facility]) [J44.9]  Yes   • Chronic back pain [M54.9, G89.29]  Yes   • Essential hypertension [I10]  Yes   • Anxiety [F41.9]  Yes   • GERD without esophagitis [K21.9]  Yes      Resolved Hospital Problems   No resolved problems to display.     Comminuted displaced fracture of the proximal humerus, Left  -XR left shoulder showed comminuted displaced fracture of the proximal humerus.  Soft tissue swelling posterior to the elbow without radiographic evidence of acute fracture dislocation of the elbow.  Mild DJD at the glenohumeral joint and acromioclavicular joint.    -CT left upper extremity showed comminuted displaced fracture proximal humerus.  Lobulated calcification adjacent to the greater tuberosity fracture fragment, most suspicious for calcific rotator cuff tendinopathy.  Large glenohumeral joint effusion with small intra-articular calcifications.   -after a fall  -sling in place  -ortho consulted and plan surgery in am  -pain control, pt stated she has had hydrocodone in the past without problems but is allergic to Tylenol 3    Fall  -slip and fall per patient report  -CT head without acute intracranial findings  -CT C-spine without evidence of acute fracture  -EKG sinus rhythm     Seizure  -pt denied any syncope. She stated she has chronic cardiovascular syncope/seizures that has been worked up extensively in the past  -she is not on seizure medications  -no neurological deficits on exam    DM Type II w/ hyperglycemia  -cont home lantus, give dose now as patient's glucose is 300s and she did not take her lantus last night  -SSI AC/HS  -cont home neurontin for peripheral neuropathy    COPD w/ tobacco abuse  -encourage cessation    Hypertension  -cont home bystolic    Anxiety  -cont home Seroquel, prn hydroxyzine    Chronic back pain  -on neurontin       VTE Prophylaxis -   Mechanical Order History:      Ordered        02/09/21 1436  Place Sequential Compression Device  Once         02/09/21 1436  Maintain  Sequential Compression Device  Continuous         02/09/21 1437  Place Sequential Compression Device  Once         02/09/21 1437  Maintain Sequential Compression Device  Continuous                 Pharmalogical Order History:     None          CODE STATUS:    Code Status and Medical Interventions:   Ordered at: 02/09/21 1436     Level Of Support Discussed With:    Patient     Code Status:    CPR     Medical Interventions (Level of Support Prior to Arrest):    Full       This patient has been examined wearing appropriate Personal Protective Equipment  02/09/21      I discussed the patient's findings and my recommendations with patient and nursing staff.      Signature: Electronically signed by KAMALA Mayen, 02/09/21, 3:29 PM EST.    [facility] Hospitalist Team    I have reviewed the notes, assessments, and/or procedures performed by KAMALA Mayen I concur with her/his documentation of Serina Powell.  Patient seen and examined agree with above, is a 50-year-old obese white female with multiple medical problems admitted with left shoulder pain after a fall at home.  Apparently she slipped on something at home and fell hitting the back of her head, her left shoulder and her left elbow.  Resulted in a fracture.  Patient was admitted for further valuation.  On exam this is a 50-year obese white female in bed no acute distress, her neck is supple, lungs are clear to auscultation, heart regular rhythm normal S1-S2, the abdomen is soft obese nontender non distended, extremities no edema, left shoulder is tender to palpation, bruising and some deformity.  Neurological exam no focal deficit.  Assessment and plan.  1.  Comminuted displaced fracture of the left humerus,  -Orthopedic surgery has been consulted planning for surgery in a.m.  Pain control.  Sling in place.  2.  Status post fall.  Will consult physical therapy.  Evaluate for possible placement..  3.  Hx Seizure disorder.  Patient denies  any syncope.  She is not on any seizure medication, no neurological deficit at this time.  4.  Type 2 diabetes with hyperglycemia.  Continue Lantus and regulation sliding scale.  Electronically signed by Armen Peralta MD, 02/10/21, 2:18 PM EST.

## 2021-02-09 NOTE — PAT
Inpt added to surgery schedule for 2/10/2021 with Dr. Tinoco.  All testing ordered.  Spoke with bedside nurse Ameena.

## 2021-02-09 NOTE — ED PROVIDER NOTES
Subjective   Chief Complaint: Shoulder pain    Patient is a 50-year-old  female history of diabetes, hypertension, cardiovascular associated syncope, seizures presents to the ER with chief complaint of left shoulder pain for 2 days.  Patient states yesterday he was she was walking to the bathroom when she states she fell backwards, hitting her left elbow on the ground and then her shoulder.  Patient states she is unsure if it was mechanical fall or syncopal episode, she states she thinks she slipped on her socks.  Patient states she did hit the back of her head, no LOC but she does report she had a seizure episode.  Patient states she laid on the ground for a few minutes before getting up but then noticed severe pain in her left shoulder and it was hard to move her left shoulder.  Patient describes pain as a constant sharp stabbing pain left shoulder and left upper arm that she rates a 9/10.  She denies any numbness or tingling, patient is able to move her left hand and wrist without difficulty.  Patient reports some left-sided neck stiffness, no neck pain, no headache.  Patient denies any chest pain shortness of breath dizziness lightheadedness or blurry vision.  Patient denies any back pain, paresthesias, saddle anesthesia or bladder or bowel dysfunction.    Location: Left shoulder    Quality: Sharp throbbing    Duration: 2 days    Timing: Constant    Severity: Moderate    Associated Symptoms: Neck pain, syncope    PCP: None      History provided by:  Patient      Review of Systems   Constitutional: Negative for chills and fever.   HENT: Negative for sore throat and trouble swallowing.    Respiratory: Negative for chest tightness, shortness of breath and wheezing.    Cardiovascular: Negative for chest pain and palpitations.   Gastrointestinal: Negative for abdominal pain, diarrhea, nausea and vomiting.   Genitourinary: Negative for dysuria.   Musculoskeletal: Positive for arthralgias and neck stiffness.  Negative for back pain.        Left shoulder pain, left arm pain   Skin: Positive for color change. Negative for rash.        Bruising right arm   Neurological: Positive for seizures and syncope. Negative for dizziness, weakness, light-headedness and headaches.   Psychiatric/Behavioral: Negative for behavioral problems.   All other systems reviewed and are negative.      Past Medical History:   Diagnosis Date   • Diabetes mellitus (CMS/HCC)    • Injury of back    • Syncope without other cardiovascular symptoms        No Known Allergies    History reviewed. No pertinent surgical history.    History reviewed. No pertinent family history.    Social History     Socioeconomic History   • Marital status: Single     Spouse name: Not on file   • Number of children: Not on file   • Years of education: Not on file   • Highest education level: Not on file   Tobacco Use   • Smoking status: Current Every Day Smoker     Packs/day: 0.50     Types: Cigarettes   • Smokeless tobacco: Never Used   Substance and Sexual Activity   • Alcohol use: Never     Frequency: Never   • Drug use: Never   • Sexual activity: Defer           Objective   Physical Exam  Vitals signs and nursing note reviewed.   Constitutional:       General: She is not in acute distress.     Appearance: Normal appearance. She is well-developed. She is obese. She is not ill-appearing, toxic-appearing or diaphoretic.   HENT:      Head: Normocephalic and atraumatic.      Right Ear: Tympanic membrane normal.      Left Ear: Tympanic membrane normal.      Ears:      Comments: No hemotympanum     Nose: Nose normal. No congestion.      Mouth/Throat:      Mouth: Mucous membranes are moist.   Eyes:      Extraocular Movements: Extraocular movements intact.      Pupils: Pupils are equal, round, and reactive to light.   Neck:      Musculoskeletal: Normal range of motion and neck supple. Muscular tenderness present.      Comments: Cervical spine: No midline tenderness to palpation.  No step-offs or deformities. Pain-free range of motion.  Tenderness to palpation left paraspinal muscles cervical spine  No pain with lateral rotation cervical spine  Cardiovascular:      Rate and Rhythm: Normal rate and regular rhythm.      Heart sounds: Normal heart sounds.   Pulmonary:      Effort: Pulmonary effort is normal. No respiratory distress.      Breath sounds: Normal breath sounds. No wheezing.   Abdominal:      General: Bowel sounds are normal. There is no distension.      Palpations: Abdomen is soft.      Tenderness: There is no abdominal tenderness.   Musculoskeletal: Normal range of motion.         General: Swelling, tenderness and signs of injury present.      Right lower leg: No edema.      Left lower leg: No edema.      Comments: Lumbar spine: No step-offs or deformities, no midline tenderness to palpation.  Bilateral lower extremities: Negative straight leg raise.  5 out of 5 strength.  Sensation intact to light touch.  Radial pulses present 2+  Sensation to soft touch intact,  strength appropriate bilaterally  Pain exacerbated by left shoulder range of motion, left shoulder abduction   Skin:     General: Skin is warm and dry.      Capillary Refill: Capillary refill takes less than 2 seconds.      Findings: Erythema present. No bruising or rash.      Comments: Ecchymosis noted left bicep   Neurological:      General: No focal deficit present.      Mental Status: She is alert and oriented to person, place, and time.      Motor: No weakness.   Psychiatric:         Mood and Affect: Mood normal.         Behavior: Behavior normal.         ECG 12 Lead      Date/Time: 2/9/2021 12:17 PM  Performed by: Didi Templeton PA  Authorized by: Didi Templeton PA   Interpreted by physician (Esther)  Comparison: compared with previous ECG from 6/10/2020  Similar to previous ECG  Comparison to previous ECG: Sinus rhythm, rate of 81 with no acute ST changes  Rhythm: sinus rhythm  Rate: normal  BPM: 75  QRS  "axis: normal  Conduction: conduction normal  ST Segments: ST segments normal  T Waves: T waves normal  Other: no other findings  Clinical impression: normal ECG                 ED Course  ED Course as of Feb 09 1333   Tue Feb 09, 2021   1150 Patient is currently in CT    [MM]   1159 Spoke with Dr. Tinoco regarding shoulder fracture, recommended shoulder sling, requested CT upper extremity    [MM]      ED Course User Index  [MM] Jareth Bethany, PA    /74   Pulse 77   Temp 98.1 °F (36.7 °C) (Oral)   Resp 18   Ht 157.5 cm (62\")   Wt (!) 150 kg (331 lb)   SpO2 98%   BMI 60.54 kg/m²   Labs Reviewed   COMPREHENSIVE METABOLIC PANEL - Abnormal; Notable for the following components:       Result Value    Glucose 321 (*)     Sodium 134 (*)     Chloride 97 (*)     All other components within normal limits    Narrative:     GFR Normal >60  Chronic Kidney Disease <60  Kidney Failure <15     POCT GLUCOSE FINGERSTICK - Abnormal; Notable for the following components:    Glucose 328 (*)     All other components within normal limits   PROTIME-INR - Normal   TROPONIN (IN-HOUSE) - Normal    Narrative:     Troponin T Reference Range:  <= 0.03 ng/mL-   Negative for AMI  >0.03 ng/mL-     Abnormal for myocardial necrosis.  Clinicians would have to utilize clinical acumen, EKG, Troponin and serial changes to determine if it is an Acute Myocardial Infarction or myocardial injury due to an underlying chronic condition.       Results may be falsely decreased if patient taking Biotin.     CBC WITH AUTO DIFFERENTIAL - Normal   MAGNESIUM - Normal   RAINBOW DRAW    Narrative:     The following orders were created for panel order [place] Draw.  Procedure                               Abnormality         Status                     ---------                               -----------         ------                     Light Blue Top[[id number]]                                   Final result               Green Top (Gel)[[id number]]          "                         Final result               Lavender Top[[id number]]                                                                Gold Top - [facility][[id number]]                                   Final result                 Please view results for these tests on the individual orders.   POCT GLUCOSE FINGERSTICK   CBC AND DIFFERENTIAL    Narrative:     The following orders were created for panel order CBC & Differential.  Procedure                               Abnormality         Status                     ---------                               -----------         ------                     CBC Auto Differential[[id number]]        Normal              Final result                 Please view results for these tests on the individual orders.   LIGHT BLUE TOP   GREEN TOP   GOLD TOP - SST     Medications   sodium chloride 0.9 % flush 10 mL (has no administration in time range)   Morphine sulfate (PF) injection 4 mg (4 mg Intravenous Given 2/9/21 1133)   ondansetron (ZOFRAN) injection 4 mg (4 mg Intravenous Given 2/9/21 1133)   insulin regular (humuLIN R,novoLIN R) injection 4 Units (4 Units Subcutaneous Given 2/9/21 1254)     Xr Shoulder 2+ View Left    Result Date: 2/9/2021   1. Comminuted displaced fracture of the proximal humerus, as above. 2. Mild DJD at the glenohumeral joint and acromioclavicular joint. 3. Soft tissue swelling posterior to the elbow without radiographic evidence of acute fracture or dislocation of the elbow. 4. Small lateral epicondyle enthesophyte.  Electronically Signed By-Matthew Gomze MD On:2/9/2021 11:37 AM This report was finalized on [id number] by  Matthew Gomez MD.    Xr Humerus Left    Result Date: 2/9/2021   1. Comminuted displaced fracture of the proximal humerus, as above. 2. Mild DJD at the glenohumeral joint and acromioclavicular joint. 3. Soft tissue swelling posterior to the elbow without radiographic evidence of acute fracture or dislocation of the elbow. 4. Small lateral  epicondyle enthesophyte.  Electronically Signed By-Matthew Gomez MD On:2/9/2021 11:37 AM This report was finalized on [id number] by  Matthew Gomez MD.    Xr Elbow 3+ View Left    Result Date: 2/9/2021   1. Comminuted displaced fracture of the proximal humerus, as above. 2. Mild DJD at the glenohumeral joint and acromioclavicular joint. 3. Soft tissue swelling posterior to the elbow without radiographic evidence of acute fracture or dislocation of the elbow. 4. Small lateral epicondyle enthesophyte.  Electronically Signed By-Matthew Gomez MD On:2/9/2021 11:37 AM This report was finalized on [id number] by  Matthew Gomez MD.    Ct Head Without Contrast    Result Date: 2/9/2021  No acute intracranial hemorrhage or mass/mass effect.  Electronically Signed By-Matthew Gomez MD On:2/9/2021 1:20 PM This report was finalized on [id number] by  Matthew Gomez MD.    Ct Cervical Spine Without Contrast    Result Date: 2/9/2021  1. No evidence of acute fracture or traumatic subluxation. 2. Degenerative disc disease most notably at C5-C6 and C6-C7 without evidence of significant spinal canal stenosis or neuroforaminal narrowing.  Electronically Signed By-Robles Douglas MD On:2/9/2021 12:14 PM This report was finalized on [id number] by  Robles Douglas MD.    Xr Chest 1 View    Result Date: 2/9/2021  1. No acute cardiopulmonary abnormality.  Electronically Signed By-Robles Douglas MD On:2/9/2021 11:37 AM This report was finalized on [id number] by  Robles Douglas MD.    Ct Upper Extremity Left Without Contrast    Result Date: 2/9/2021   1. Comminuted displaced fracture of proximal humerus, as above. 2. Lobulated calcification adjacent to the greater tuberosity fracture fragment, most suspicious for calcific rotator cuff tendinopathy. 3. Large glenohumeral joint effusion with small intra-articular calcifications. 4. Mild age-appropriate DJD at the AC joint.  Electronically Signed By-Matthew Gomez MD On:2/9/2021  "12:36 PM This report was finalized on [id number] by  Matthew Gomez MD.                                           MDM  Number of Diagnoses or Management Options  Closed displaced fracture of surgical neck of left humerus, unspecified fracture morphology, initial encounter:   Near syncope:   Pain of left upper extremity:   Seizure (CMS/HCC):   Diagnosis management comments: MEDICAL DECISION  Epic Chart Review: No recent hospital admissions  Comorbidities: Diabetes, hypertension, \"cardiovascular stress-induced syncope\", seizures  Differentials: Fracture, contusion, dislocation, syncope; this list is not all inclusive and does not constitute the entirety of considered causes  Radiology interpretation:  Images reviewed by me and interpreted by radiologist,   CT head shows no acute intracranial abnormalities  CT cervical spine shows no acute osseous abnormalities.  Chest x-ray shows no acute cardiopulmonary abnormality  X-ray left shoulder, humerus, elbow shows comminuted displaced fracture of the proximal humerus involving the surgical and anatomic necks with approximate 2 cm impaction of the distal fracture fragment  Lab interpretation:  Labs viewed by me significant for, POC glucose 328.  CMP glucose 321, sodium 134, chloride 97.  PT/INR normal.  Troponin normal less than 0.01.  CBC essentially normal.  Magnesium normal 1.6.  EKG interpretation: Reviewed by self, interpreted by Dr. Santana, sinus rhythm with a rate of 75 with no acute ST changes    While in the ED IV was placed and labs were obtained appropriate PPE was worn during exam and throughout all encounters with the patient.  Patient had the above evaluation.  IV established, lab work obtained.  Patient given 4 mg morphine and 4 mg Zofran for pain and nausea.  X-ray left shoulder, humerus significant for proximal comminuted fracture of left humeral surgical neck.  CT head and CT cervical spine showed no acute osseous or intracranial abnormalities.  Lab work " essentially unremarkable.  Consult placed to orthopedic specialist, spoke with Dr. Tinoco who requested patient be placed in sling, CT upper extremity and he would consult patient while in the hospital.  I then spoke with Dr. Tinoco's , Meme who requested preop blood work in preparation for surgery tomorrow morning 8 AM.  This was discussed with patient at bedside.  Due to patient's presyncope, seizure event prior to fall patient will be admitted for observation prior to and in preparation for her surgery tomorrow.  Patient is agreeable to this plan, on reevaluation she reports improvement in her pain repeat neuro exam is normal.  Patient was placed in sling, patient is neurovascular tact distally plus sling placement.  Consult placed to hospitalist, spoke with JOYCE Calix who agreed accept patient for Dr. Peralta.  Patient stable on admission.         Amount and/or Complexity of Data Reviewed  Clinical lab tests: reviewed and ordered  Tests in the radiology section of CPT®: reviewed and ordered    Patient Progress  Patient progress: stable      Final diagnoses:   Near syncope   Closed displaced fracture of surgical neck of left humerus, unspecified fracture morphology, initial encounter   Pain of left upper extremity   Seizure (CMS/[facility])            Didi Templeton PA  02/09/21 8260

## 2021-02-10 ENCOUNTER — ANESTHESIA (OUTPATIENT)
Dept: PERIOP | Facility: HOSPITAL | Age: 51
End: 2021-02-10

## 2021-02-10 ENCOUNTER — ANESTHESIA EVENT (OUTPATIENT)
Dept: PERIOP | Facility: HOSPITAL | Age: 51
End: 2021-02-10

## 2021-02-10 ENCOUNTER — APPOINTMENT (OUTPATIENT)
Dept: GENERAL RADIOLOGY | Facility: HOSPITAL | Age: 51
End: 2021-02-10

## 2021-02-10 LAB
ANION GAP SERPL CALCULATED.3IONS-SCNC: 11 MMOL/L (ref 5–15)
BASOPHILS # BLD AUTO: 0.1 10*3/MM3 (ref 0–0.2)
BASOPHILS NFR BLD AUTO: 1.1 % (ref 0–1.5)
BUN SERPL-MCNC: 9 MG/DL (ref 6–20)
BUN/CREAT SERPL: 13.8 (ref 7–25)
CALCIUM SPEC-SCNC: 9 MG/DL (ref 8.6–10.5)
CHLORIDE SERPL-SCNC: 99 MMOL/L (ref 98–107)
CO2 SERPL-SCNC: 25 MMOL/L (ref 22–29)
CREAT SERPL-MCNC: 0.65 MG/DL (ref 0.57–1)
DEPRECATED RDW RBC AUTO: 39.8 FL (ref 37–54)
EOSINOPHIL # BLD AUTO: 0.2 10*3/MM3 (ref 0–0.4)
EOSINOPHIL NFR BLD AUTO: 2.5 % (ref 0.3–6.2)
ERYTHROCYTE [DISTWIDTH] IN BLOOD BY AUTOMATED COUNT: 12.9 % (ref 12.3–15.4)
GFR SERPL CREATININE-BSD FRML MDRD: 96 ML/MIN/1.73
GLUCOSE BLDC GLUCOMTR-MCNC: 282 MG/DL (ref 70–105)
GLUCOSE BLDC GLUCOMTR-MCNC: 292 MG/DL (ref 70–105)
GLUCOSE BLDC GLUCOMTR-MCNC: 297 MG/DL (ref 70–105)
GLUCOSE BLDC GLUCOMTR-MCNC: 318 MG/DL (ref 70–105)
GLUCOSE BLDC GLUCOMTR-MCNC: 410 MG/DL (ref 70–105)
GLUCOSE SERPL-MCNC: 271 MG/DL (ref 65–99)
HCT VFR BLD AUTO: 42.2 % (ref 34–46.6)
HGB BLD-MCNC: 14.1 G/DL (ref 12–15.9)
LYMPHOCYTES # BLD AUTO: 3.8 10*3/MM3 (ref 0.7–3.1)
LYMPHOCYTES NFR BLD AUTO: 39.7 % (ref 19.6–45.3)
MAGNESIUM SERPL-MCNC: 1.8 MG/DL (ref 1.6–2.6)
MCH RBC QN AUTO: 29.4 PG (ref 26.6–33)
MCHC RBC AUTO-ENTMCNC: 33.5 G/DL (ref 31.5–35.7)
MCV RBC AUTO: 87.9 FL (ref 79–97)
MONOCYTES # BLD AUTO: 0.7 10*3/MM3 (ref 0.1–0.9)
MONOCYTES NFR BLD AUTO: 7.2 % (ref 5–12)
NEUTROPHILS NFR BLD AUTO: 4.7 10*3/MM3 (ref 1.7–7)
NEUTROPHILS NFR BLD AUTO: 49.5 % (ref 42.7–76)
NRBC BLD AUTO-RTO: 0 /100 WBC (ref 0–0.2)
PLATELET # BLD AUTO: 183 10*3/MM3 (ref 140–450)
PMV BLD AUTO: 9.6 FL (ref 6–12)
POTASSIUM SERPL-SCNC: 3.7 MMOL/L (ref 3.5–5.2)
RBC # BLD AUTO: 4.8 10*6/MM3 (ref 3.77–5.28)
SODIUM SERPL-SCNC: 135 MMOL/L (ref 136–145)
WBC # BLD AUTO: 9.6 10*3/MM3 (ref 3.4–10.8)

## 2021-02-10 PROCEDURE — 76000 FLUOROSCOPY <1 HR PHYS/QHP: CPT

## 2021-02-10 PROCEDURE — 63710000001 INSULIN GLARGINE PER 5 UNITS: Performed by: INTERNAL MEDICINE

## 2021-02-10 PROCEDURE — 25010000002 CEFAZOLIN PER 500 MG: Performed by: ORTHOPAEDIC SURGERY

## 2021-02-10 PROCEDURE — G0378 HOSPITAL OBSERVATION PER HR: HCPCS

## 2021-02-10 PROCEDURE — C1713 ANCHOR/SCREW BN/BN,TIS/BN: HCPCS | Performed by: ORTHOPAEDIC SURGERY

## 2021-02-10 PROCEDURE — 73060 X-RAY EXAM OF HUMERUS: CPT

## 2021-02-10 PROCEDURE — 82962 GLUCOSE BLOOD TEST: CPT

## 2021-02-10 PROCEDURE — 25010000002 FENTANYL CITRATE (PF) 100 MCG/2ML SOLUTION: Performed by: NURSE ANESTHETIST, CERTIFIED REGISTERED

## 2021-02-10 PROCEDURE — 63710000001 INSULIN LISPRO (HUMAN) PER 5 UNITS: Performed by: INTERNAL MEDICINE

## 2021-02-10 PROCEDURE — 63710000001 INSULIN LISPRO (HUMAN) PER 5 UNITS: Performed by: NURSE PRACTITIONER

## 2021-02-10 PROCEDURE — 94799 UNLISTED PULMONARY SVC/PX: CPT

## 2021-02-10 PROCEDURE — C1889 IMPLANT/INSERT DEVICE, NOC: HCPCS | Performed by: ORTHOPAEDIC SURGERY

## 2021-02-10 PROCEDURE — 25010000002 HYDROMORPHONE PER 4 MG: Performed by: NURSE ANESTHETIST, CERTIFIED REGISTERED

## 2021-02-10 PROCEDURE — 94760 N-INVAS EAR/PLS OXIMETRY 1: CPT

## 2021-02-10 PROCEDURE — 25010000002 DEXAMETHASONE PER 1 MG: Performed by: NURSE ANESTHETIST, CERTIFIED REGISTERED

## 2021-02-10 PROCEDURE — 96375 TX/PRO/DX INJ NEW DRUG ADDON: CPT

## 2021-02-10 PROCEDURE — 25010000002 ONDANSETRON PER 1 MG: Performed by: NURSE ANESTHETIST, CERTIFIED REGISTERED

## 2021-02-10 PROCEDURE — 83735 ASSAY OF MAGNESIUM: CPT | Performed by: NURSE PRACTITIONER

## 2021-02-10 PROCEDURE — 25010000002 MIDAZOLAM PER 1 MG: Performed by: NURSE ANESTHETIST, CERTIFIED REGISTERED

## 2021-02-10 PROCEDURE — 25010000002 PROPOFOL 10 MG/ML EMULSION: Performed by: NURSE ANESTHETIST, CERTIFIED REGISTERED

## 2021-02-10 PROCEDURE — 85025 COMPLETE CBC W/AUTO DIFF WBC: CPT | Performed by: NURSE PRACTITIONER

## 2021-02-10 PROCEDURE — 80048 BASIC METABOLIC PNL TOTAL CA: CPT | Performed by: NURSE PRACTITIONER

## 2021-02-10 PROCEDURE — 99225 PR SBSQ OBSERVATION CARE/DAY 25 MINUTES: CPT | Performed by: INTERNAL MEDICINE

## 2021-02-10 DEVICE — LOCKING SCREW
Type: IMPLANTABLE DEVICE | Site: HUMERUS | Status: FUNCTIONAL
Brand: AXSOS

## 2021-02-10 DEVICE — CANCELLOUS SCREW
Type: IMPLANTABLE DEVICE | Site: HUMERUS | Status: FUNCTIONAL
Brand: AXSOS

## 2021-02-10 DEVICE — CORTEX SCREW
Type: IMPLANTABLE DEVICE | Site: HUMERUS | Status: FUNCTIONAL
Brand: AXSOS

## 2021-02-10 DEVICE — SUT FW #2 W/TPR NDL 1/2 CIR 38IN 97CM 26.5MM BLU: Type: IMPLANTABLE DEVICE | Site: HUMERUS | Status: FUNCTIONAL

## 2021-02-10 DEVICE — PROXIMAL LATERAL HUMERUS PLATE, LEFT
Type: IMPLANTABLE DEVICE | Site: HUMERUS | Status: FUNCTIONAL
Brand: AXSOS

## 2021-02-10 RX ORDER — ONDANSETRON 4 MG/1
4 TABLET, FILM COATED ORAL EVERY 6 HOURS PRN
Status: DISCONTINUED | OUTPATIENT
Start: 2021-02-10 | End: 2021-02-11 | Stop reason: HOSPADM

## 2021-02-10 RX ORDER — PROMETHAZINE HYDROCHLORIDE 25 MG/1
25 SUPPOSITORY RECTAL ONCE AS NEEDED
Status: DISCONTINUED | OUTPATIENT
Start: 2021-02-10 | End: 2021-02-10 | Stop reason: HOSPADM

## 2021-02-10 RX ORDER — DEXTROSE MONOHYDRATE 25 G/50ML
25 INJECTION, SOLUTION INTRAVENOUS
Status: DISCONTINUED | OUTPATIENT
Start: 2021-02-10 | End: 2021-02-11 | Stop reason: HOSPADM

## 2021-02-10 RX ORDER — ONDANSETRON 2 MG/ML
4 INJECTION INTRAMUSCULAR; INTRAVENOUS ONCE
Status: DISCONTINUED | OUTPATIENT
Start: 2021-02-10 | End: 2021-02-10

## 2021-02-10 RX ORDER — DIAZEPAM 5 MG/1
5 TABLET ORAL EVERY 6 HOURS PRN
Status: DISCONTINUED | OUTPATIENT
Start: 2021-02-10 | End: 2021-02-11 | Stop reason: HOSPADM

## 2021-02-10 RX ORDER — INSULIN LISPRO 100 [IU]/ML
0-7 INJECTION, SOLUTION INTRAVENOUS; SUBCUTANEOUS
Status: DISCONTINUED | OUTPATIENT
Start: 2021-02-10 | End: 2021-02-11 | Stop reason: HOSPADM

## 2021-02-10 RX ORDER — HYDROXYZINE HYDROCHLORIDE 25 MG/1
25 TABLET, FILM COATED ORAL 2 TIMES DAILY PRN
Status: DISCONTINUED | OUTPATIENT
Start: 2021-02-10 | End: 2021-02-11 | Stop reason: HOSPADM

## 2021-02-10 RX ORDER — BUPIVACAINE HYDROCHLORIDE 5 MG/ML
INJECTION, SOLUTION PERINEURAL AS NEEDED
Status: DISCONTINUED | OUTPATIENT
Start: 2021-02-10 | End: 2021-02-10 | Stop reason: HOSPADM

## 2021-02-10 RX ORDER — ONDANSETRON 2 MG/ML
INJECTION INTRAMUSCULAR; INTRAVENOUS AS NEEDED
Status: DISCONTINUED | OUTPATIENT
Start: 2021-02-10 | End: 2021-02-10 | Stop reason: SURG

## 2021-02-10 RX ORDER — ONDANSETRON 2 MG/ML
4 INJECTION INTRAMUSCULAR; INTRAVENOUS EVERY 6 HOURS PRN
Status: DISCONTINUED | OUTPATIENT
Start: 2021-02-10 | End: 2021-02-11 | Stop reason: HOSPADM

## 2021-02-10 RX ORDER — ACETAMINOPHEN 650 MG/1
650 SUPPOSITORY RECTAL EVERY 4 HOURS PRN
Status: DISCONTINUED | OUTPATIENT
Start: 2021-02-10 | End: 2021-02-11 | Stop reason: HOSPADM

## 2021-02-10 RX ORDER — ALUMINA, MAGNESIA, AND SIMETHICONE 2400; 2400; 240 MG/30ML; MG/30ML; MG/30ML
15 SUSPENSION ORAL EVERY 6 HOURS PRN
Status: DISCONTINUED | OUTPATIENT
Start: 2021-02-10 | End: 2021-02-11 | Stop reason: HOSPADM

## 2021-02-10 RX ORDER — PROPOFOL 10 MG/ML
VIAL (ML) INTRAVENOUS AS NEEDED
Status: DISCONTINUED | OUTPATIENT
Start: 2021-02-10 | End: 2021-02-10 | Stop reason: SURG

## 2021-02-10 RX ORDER — FENTANYL CITRATE 50 UG/ML
INJECTION, SOLUTION INTRAMUSCULAR; INTRAVENOUS AS NEEDED
Status: DISCONTINUED | OUTPATIENT
Start: 2021-02-10 | End: 2021-02-10 | Stop reason: SURG

## 2021-02-10 RX ORDER — DEXAMETHASONE SODIUM PHOSPHATE 4 MG/ML
INJECTION, SOLUTION INTRA-ARTICULAR; INTRALESIONAL; INTRAMUSCULAR; INTRAVENOUS; SOFT TISSUE AS NEEDED
Status: DISCONTINUED | OUTPATIENT
Start: 2021-02-10 | End: 2021-02-10 | Stop reason: SURG

## 2021-02-10 RX ORDER — NEOSTIGMINE METHYLSULFATE 5 MG/5 ML
SYRINGE (ML) INTRAVENOUS AS NEEDED
Status: DISCONTINUED | OUTPATIENT
Start: 2021-02-10 | End: 2021-02-10 | Stop reason: SURG

## 2021-02-10 RX ORDER — DIPHENHYDRAMINE HCL 25 MG
25 CAPSULE ORAL EVERY 6 HOURS PRN
Status: DISCONTINUED | OUTPATIENT
Start: 2021-02-10 | End: 2021-02-11 | Stop reason: HOSPADM

## 2021-02-10 RX ORDER — HYDROMORPHONE HCL 110MG/55ML
0.2 PATIENT CONTROLLED ANALGESIA SYRINGE INTRAVENOUS
Status: DISCONTINUED | OUTPATIENT
Start: 2021-02-10 | End: 2021-02-10 | Stop reason: HOSPADM

## 2021-02-10 RX ORDER — MAGNESIUM SULFATE HEPTAHYDRATE 40 MG/ML
2 INJECTION, SOLUTION INTRAVENOUS AS NEEDED
Status: DISCONTINUED | OUTPATIENT
Start: 2021-02-10 | End: 2021-02-11 | Stop reason: HOSPADM

## 2021-02-10 RX ORDER — QUETIAPINE FUMARATE 100 MG/1
200 TABLET, FILM COATED ORAL NIGHTLY
Status: DISCONTINUED | OUTPATIENT
Start: 2021-02-10 | End: 2021-02-11 | Stop reason: HOSPADM

## 2021-02-10 RX ORDER — HYDRALAZINE HYDROCHLORIDE 20 MG/ML
5 INJECTION INTRAMUSCULAR; INTRAVENOUS
Status: DISCONTINUED | OUTPATIENT
Start: 2021-02-10 | End: 2021-02-10 | Stop reason: HOSPADM

## 2021-02-10 RX ORDER — MAGNESIUM SULFATE 1 G/100ML
1 INJECTION INTRAVENOUS AS NEEDED
Status: DISCONTINUED | OUTPATIENT
Start: 2021-02-10 | End: 2021-02-11 | Stop reason: HOSPADM

## 2021-02-10 RX ORDER — NICOTINE 21 MG/24HR
1 PATCH, TRANSDERMAL 24 HOURS TRANSDERMAL
Status: DISCONTINUED | OUTPATIENT
Start: 2021-02-10 | End: 2021-02-11 | Stop reason: HOSPADM

## 2021-02-10 RX ORDER — HYDROCODONE BITARTRATE AND ACETAMINOPHEN 10; 325 MG/1; MG/1
1 TABLET ORAL EVERY 4 HOURS PRN
Status: DISCONTINUED | OUTPATIENT
Start: 2021-02-10 | End: 2021-02-11 | Stop reason: HOSPADM

## 2021-02-10 RX ORDER — LIDOCAINE HYDROCHLORIDE 20 MG/ML
INJECTION, SOLUTION EPIDURAL; INFILTRATION; INTRACAUDAL; PERINEURAL AS NEEDED
Status: DISCONTINUED | OUTPATIENT
Start: 2021-02-10 | End: 2021-02-10 | Stop reason: SURG

## 2021-02-10 RX ORDER — GLYCOPYRROLATE 1 MG/5 ML
SYRINGE (ML) INTRAVENOUS AS NEEDED
Status: DISCONTINUED | OUTPATIENT
Start: 2021-02-10 | End: 2021-02-10 | Stop reason: SURG

## 2021-02-10 RX ORDER — ONDANSETRON 2 MG/ML
4 INJECTION INTRAMUSCULAR; INTRAVENOUS ONCE AS NEEDED
Status: DISCONTINUED | OUTPATIENT
Start: 2021-02-10 | End: 2021-02-10 | Stop reason: HOSPADM

## 2021-02-10 RX ORDER — BUDESONIDE AND FORMOTEROL FUMARATE DIHYDRATE 80; 4.5 UG/1; UG/1
2 AEROSOL RESPIRATORY (INHALATION)
Status: DISCONTINUED | OUTPATIENT
Start: 2021-02-10 | End: 2021-02-11 | Stop reason: HOSPADM

## 2021-02-10 RX ORDER — MORPHINE SULFATE 4 MG/ML
4 INJECTION, SOLUTION INTRAMUSCULAR; INTRAVENOUS ONCE
Status: DISCONTINUED | OUTPATIENT
Start: 2021-02-10 | End: 2021-02-10

## 2021-02-10 RX ORDER — NICOTINE POLACRILEX 4 MG
15 LOZENGE BUCCAL
Status: DISCONTINUED | OUTPATIENT
Start: 2021-02-10 | End: 2021-02-11 | Stop reason: HOSPADM

## 2021-02-10 RX ORDER — PROMETHAZINE HYDROCHLORIDE 25 MG/1
25 TABLET ORAL ONCE AS NEEDED
Status: DISCONTINUED | OUTPATIENT
Start: 2021-02-10 | End: 2021-02-10 | Stop reason: HOSPADM

## 2021-02-10 RX ORDER — INSULIN GLARGINE 100 [IU]/ML
22 INJECTION, SOLUTION SUBCUTANEOUS NIGHTLY
Status: DISCONTINUED | OUTPATIENT
Start: 2021-02-10 | End: 2021-02-11 | Stop reason: HOSPADM

## 2021-02-10 RX ORDER — SODIUM CHLORIDE 0.9 % (FLUSH) 0.9 %
10 SYRINGE (ML) INJECTION EVERY 12 HOURS SCHEDULED
Status: DISCONTINUED | OUTPATIENT
Start: 2021-02-10 | End: 2021-02-11 | Stop reason: HOSPADM

## 2021-02-10 RX ORDER — SODIUM CHLORIDE, SODIUM LACTATE, POTASSIUM CHLORIDE, CALCIUM CHLORIDE 600; 310; 30; 20 MG/100ML; MG/100ML; MG/100ML; MG/100ML
1000 INJECTION, SOLUTION INTRAVENOUS CONTINUOUS
Status: DISCONTINUED | OUTPATIENT
Start: 2021-02-10 | End: 2021-02-10

## 2021-02-10 RX ORDER — NALOXONE HCL 0.4 MG/ML
0.4 VIAL (ML) INJECTION
Status: DISCONTINUED | OUTPATIENT
Start: 2021-02-10 | End: 2021-02-11 | Stop reason: HOSPADM

## 2021-02-10 RX ORDER — POTASSIUM CHLORIDE 1.5 G/1.77G
40 POWDER, FOR SOLUTION ORAL AS NEEDED
Status: DISCONTINUED | OUTPATIENT
Start: 2021-02-10 | End: 2021-02-11 | Stop reason: HOSPADM

## 2021-02-10 RX ORDER — POTASSIUM CHLORIDE 20 MEQ/1
40 TABLET, EXTENDED RELEASE ORAL AS NEEDED
Status: DISCONTINUED | OUTPATIENT
Start: 2021-02-10 | End: 2021-02-11 | Stop reason: HOSPADM

## 2021-02-10 RX ORDER — ACETAMINOPHEN 325 MG/1
650 TABLET ORAL EVERY 4 HOURS PRN
Status: DISCONTINUED | OUTPATIENT
Start: 2021-02-10 | End: 2021-02-11 | Stop reason: HOSPADM

## 2021-02-10 RX ORDER — INSULIN LISPRO 100 [IU]/ML
0-7 INJECTION, SOLUTION INTRAVENOUS; SUBCUTANEOUS AS NEEDED
Status: DISCONTINUED | OUTPATIENT
Start: 2021-02-10 | End: 2021-02-11 | Stop reason: HOSPADM

## 2021-02-10 RX ORDER — DOCUSATE SODIUM 100 MG/1
100 CAPSULE, LIQUID FILLED ORAL DAILY
Status: DISCONTINUED | OUTPATIENT
Start: 2021-02-10 | End: 2021-02-11 | Stop reason: HOSPADM

## 2021-02-10 RX ORDER — MIDAZOLAM HYDROCHLORIDE 1 MG/ML
INJECTION INTRAMUSCULAR; INTRAVENOUS AS NEEDED
Status: DISCONTINUED | OUTPATIENT
Start: 2021-02-10 | End: 2021-02-10 | Stop reason: SURG

## 2021-02-10 RX ORDER — HYDROMORPHONE HCL 110MG/55ML
1 PATIENT CONTROLLED ANALGESIA SYRINGE INTRAVENOUS
Status: DISCONTINUED | OUTPATIENT
Start: 2021-02-10 | End: 2021-02-11 | Stop reason: HOSPADM

## 2021-02-10 RX ORDER — HYDROMORPHONE HCL 110MG/55ML
0.5 PATIENT CONTROLLED ANALGESIA SYRINGE INTRAVENOUS
Status: DISCONTINUED | OUTPATIENT
Start: 2021-02-10 | End: 2021-02-10 | Stop reason: HOSPADM

## 2021-02-10 RX ORDER — NEBIVOLOL 10 MG/1
10 TABLET ORAL DAILY
Status: DISCONTINUED | OUTPATIENT
Start: 2021-02-10 | End: 2021-02-11 | Stop reason: HOSPADM

## 2021-02-10 RX ORDER — SODIUM CHLORIDE 0.9 % (FLUSH) 0.9 %
10 SYRINGE (ML) INJECTION AS NEEDED
Status: DISCONTINUED | OUTPATIENT
Start: 2021-02-10 | End: 2021-02-11 | Stop reason: HOSPADM

## 2021-02-10 RX ORDER — NYSTATIN 100000 [USP'U]/G
POWDER TOPICAL EVERY 12 HOURS SCHEDULED
Status: DISCONTINUED | OUTPATIENT
Start: 2021-02-10 | End: 2021-02-11 | Stop reason: HOSPADM

## 2021-02-10 RX ORDER — HYDROCODONE BITARTRATE AND ACETAMINOPHEN 5; 325 MG/1; MG/1
1 TABLET ORAL EVERY 4 HOURS PRN
Status: DISCONTINUED | OUTPATIENT
Start: 2021-02-10 | End: 2021-02-11 | Stop reason: HOSPADM

## 2021-02-10 RX ADMIN — GABAPENTIN 100 MG: 100 CAPSULE ORAL at 22:04

## 2021-02-10 RX ADMIN — HYDROMORPHONE HYDROCHLORIDE 0.5 MG: 2 INJECTION, SOLUTION INTRAMUSCULAR; INTRAVENOUS; SUBCUTANEOUS at 10:28

## 2021-02-10 RX ADMIN — HYDROMORPHONE HYDROCHLORIDE 0.5 MG: 2 INJECTION, SOLUTION INTRAMUSCULAR; INTRAVENOUS; SUBCUTANEOUS at 10:12

## 2021-02-10 RX ADMIN — FENTANYL CITRATE 50 MCG: 50 INJECTION, SOLUTION INTRAMUSCULAR; INTRAVENOUS at 08:26

## 2021-02-10 RX ADMIN — LIDOCAINE HYDROCHLORIDE 50 MG: 20 INJECTION, SOLUTION EPIDURAL; INFILTRATION; INTRACAUDAL; PERINEURAL at 07:29

## 2021-02-10 RX ADMIN — PROPOFOL 200 MG: 10 INJECTION, EMULSION INTRAVENOUS at 07:29

## 2021-02-10 RX ADMIN — INSULIN LISPRO 4 UNITS: 100 INJECTION, SOLUTION INTRAVENOUS; SUBCUTANEOUS at 09:39

## 2021-02-10 RX ADMIN — FENTANYL CITRATE 100 MCG: 50 INJECTION, SOLUTION INTRAMUSCULAR; INTRAVENOUS at 07:29

## 2021-02-10 RX ADMIN — BUDESONIDE AND FORMOTEROL FUMARATE DIHYDRATE 2 PUFF: 80; 4.5 AEROSOL RESPIRATORY (INHALATION) at 06:43

## 2021-02-10 RX ADMIN — Medication 1 PATCH: at 13:27

## 2021-02-10 RX ADMIN — CEFAZOLIN SODIUM 2 G: 10 INJECTION, POWDER, FOR SOLUTION INTRAVENOUS at 06:22

## 2021-02-10 RX ADMIN — NEBIVOLOL HYDROCHLORIDE 10 MG: 10 TABLET ORAL at 13:28

## 2021-02-10 RX ADMIN — ONDANSETRON 4 MG: 2 INJECTION INTRAMUSCULAR; INTRAVENOUS at 07:29

## 2021-02-10 RX ADMIN — CEFAZOLIN SODIUM 2 G: 10 INJECTION, POWDER, FOR SOLUTION INTRAVENOUS at 13:27

## 2021-02-10 RX ADMIN — INSULIN LISPRO 5 UNITS: 100 INJECTION, SOLUTION INTRAVENOUS; SUBCUTANEOUS at 07:21

## 2021-02-10 RX ADMIN — BUDESONIDE AND FORMOTEROL FUMARATE DIHYDRATE 2 PUFF: 80; 4.5 AEROSOL RESPIRATORY (INHALATION) at 19:25

## 2021-02-10 RX ADMIN — DOCUSATE SODIUM 100 MG: 100 CAPSULE, LIQUID FILLED ORAL at 13:30

## 2021-02-10 RX ADMIN — Medication 0.6 MG: at 09:26

## 2021-02-10 RX ADMIN — MIDAZOLAM 2 MG: 1 INJECTION INTRAMUSCULAR; INTRAVENOUS at 07:29

## 2021-02-10 RX ADMIN — INSULIN GLARGINE 22 UNITS: 100 INJECTION, SOLUTION SUBCUTANEOUS at 22:04

## 2021-02-10 RX ADMIN — Medication 10 ML: at 22:04

## 2021-02-10 RX ADMIN — HYDROCODONE BITARTRATE AND ACETAMINOPHEN 1 TABLET: 10; 325 TABLET ORAL at 17:47

## 2021-02-10 RX ADMIN — QUETIAPINE FUMARATE 200 MG: 100 TABLET ORAL at 22:04

## 2021-02-10 RX ADMIN — Medication 3 MG: at 09:26

## 2021-02-10 RX ADMIN — FENTANYL CITRATE 100 MCG: 50 INJECTION, SOLUTION INTRAMUSCULAR; INTRAVENOUS at 08:03

## 2021-02-10 RX ADMIN — CEFAZOLIN SODIUM 2 G: 10 INJECTION, POWDER, FOR SOLUTION INTRAVENOUS at 22:30

## 2021-02-10 RX ADMIN — INSULIN LISPRO 4 UNITS: 100 INJECTION, SOLUTION INTRAVENOUS; SUBCUTANEOUS at 13:28

## 2021-02-10 RX ADMIN — INSULIN LISPRO 7 UNITS: 100 INJECTION, SOLUTION INTRAVENOUS; SUBCUTANEOUS at 16:20

## 2021-02-10 RX ADMIN — NYSTATIN: 100000 POWDER TOPICAL at 22:04

## 2021-02-10 RX ADMIN — DEXAMETHASONE SODIUM PHOSPHATE 4 MG: 4 INJECTION, SOLUTION INTRAMUSCULAR; INTRAVENOUS at 07:29

## 2021-02-10 RX ADMIN — INSULIN LISPRO 4 UNITS: 100 INJECTION, SOLUTION INTRAVENOUS; SUBCUTANEOUS at 06:33

## 2021-02-10 RX ADMIN — HYDROCODONE BITARTRATE AND ACETAMINOPHEN 1 TABLET: 10; 325 TABLET ORAL at 22:03

## 2021-02-10 RX ADMIN — SODIUM CHLORIDE, POTASSIUM CHLORIDE, SODIUM LACTATE AND CALCIUM CHLORIDE 1000 ML: 600; 310; 30; 20 INJECTION, SOLUTION INTRAVENOUS at 07:24

## 2021-02-10 NOTE — OP NOTE
Preoperative diagnosis three-part fracture left shoulder  Postoperative diagnosis same displaced  Surgeon Dr. Julián Tinoco  Anesthesia General by Dr. Lima supplemented with local  Assistants: Miya  Complications drains transfusions none.  Estimated blood loss less than 100 cc     Operation performed open reduction internal fixation left shoulder    Indications this patient is a 50-year-old female who fell at home injuring her left shoulder.  Diagnosis and treatment plan as well as risks and potential complications were discussed with her that include but not exclusive to continued pain arthritis need for reoperation she understood these and still desired to have me do the procedure.    Procedure the patient was taken the operating room where she was given a general anesthesia 2 g of perioperative Kefzol and her left shoulder area was prepped and draped in usual sterile manner.  A deltopectoral incision was made skin was dissected and soft tissue and a vascular structure retractor harm's way hemocoagulation was obtained throughout the procedure Bovie cauterization.  The fracture sites were identified and then reduced and the greater tuberosity was found to be posterior and superior and it was brought anteriorly with a #2 FiberWire.  The FiberWire was placed through a [place] proximal humeral locking plate the plate was put in proper position and secured with cortical and locking screws.  X-rays were taken which showed good reduction of the fractures and good placement of the plate and screws.  Following this the wound was washed out with copious amounts of sterile saline the subcu fascia were closed with interrupted sutures of 0 Vicryl and the skin was closed with staples covered with sterile gauze ABD and the patient was placed in a sling.  The needle and sponge count was correct at the end of the procedure the patient tolerated procedure well and was taken to the recovery in satisfactory condition.

## 2021-02-10 NOTE — PLAN OF CARE
Goal Outcome Evaluation:         Pt had surgery on arm and returned this afternoon. She is painful and having a difficult time getting out of bed and to restroom. She will need to be evaluated by PT to see if she is safe to go home.

## 2021-02-10 NOTE — PROGRESS NOTES
"      [facility] Medicine Services Daily Progress Note      Hospitalist Team  LOS 0 days      Patient Care Team:  Provider, No Known as PCP - General    Patient Location: 4107/1      Subjective   Subjective     Chief Complaint / Subjective  Chief Complaint   Patient presents with   • Shoulder Pain         Brief Synopsis of Hospital Course/HPI     Ms. Powell is a 50 y.o. female with past medical history of cardiovascular syncope/seizures, diabetes mellitus type 2 with peripheral neuropathy, HTN, COPD with continued tobacco abuse, anxiety, chronic back pain, and GERD who presented to [facility] ED 2/9/2021 with complaints of left shoulder pain after a fall at home.  He stated she slipped on something with her sock at home and fell hitting the back of her head, her left shoulder, and her left elbow.  She stated she then had a seizure.  She lied on the floor until her seizure passed.  She stated her seizures are not typical seizures and she is awake when they occur. Her seizures vary each time and different parts of her body shake. She cannot describe these \"seizures\" very well. She stated she did not pass out. She is not on seizure medications. She denied any chest pain, cough, or shortness of breath.  She stated she went to bed last night after her fall and tried to sleep off her pain but the pain persisted this morning and she called EMS to bring her to the ER.     In the ER the patient had CT head that showed no acute intracranial hemorrhage or mass-effect.  CT C-spine without evidence of acute fracture.  X-ray left shoulder showed comminuted displaced fracture of the proximal humerus.  Soft tissue swelling posterior to the elbow without radiographic evidence of acute fracture dislocation of the elbow.  Mild DJD at the glenohumeral joint and acromioclavicular joint.  CT left upper extremity showed comminuted displaced fracture proximal humerus.  Lobulated calcification adjacent to the " "greater tuberosity fracture fragment, most suspicious for calcific rotator cuff tendinopathy.  Large glenohumeral joint effusion with small intra-articular calcifications.  Her glucose was 321 and she was given 4 units regular insulin.  She stated she did not take her Lantus last night. Dr. Alejandrina martins was called and recommended sling placement, plans for surgery in the am. She was admitted to the hospitalist group.     Date:2/10/21: Patient seen and examined in bed no acute distress, complaining of shoulder pain.  Status post open reduction internal fixation left shoulder    Review of Systems   Constitution: Negative.   HENT: Negative.    Eyes: Negative.    Cardiovascular: Negative.    Respiratory: Negative.    Endocrine: Negative.    Hematologic/Lymphatic: Negative.    Skin: Negative.    Musculoskeletal: Positive for joint pain and joint swelling.   Gastrointestinal: Negative.    Genitourinary: Negative.    Neurological: Negative.    Psychiatric/Behavioral: Negative.    Allergic/Immunologic: Negative.    All other systems reviewed and are negative.        Objective   Objective      Vital Signs  Temp:  [96.8 °F (36 °C)-98.4 °F (36.9 °C)] 98.1 °F (36.7 °C)  Heart Rate:  [53-87] 72  Resp:  [10-19] 13  BP: ()/(51-87) 138/82  Oxygen Therapy  SpO2: 100 %  Pulse Oximetry Type: Intermittent  Device (Oxygen Therapy): room air  Flowsheet Rows      First Filed Value   Admission Height  157.5 cm (62\") Documented at 02/09/2021 1022   Admission Weight  (!) 150 kg (331 lb) Documented at 02/09/2021 1022        Intake & Output (last 3 days)       02/07 0701 - 02/08 0700 02/08 0701 - 02/09 0700 02/09 0701 - 02/10 0700 02/10 0701 - 02/11 0700    I.V. (mL/kg)    1000 (7.1)    Total Intake(mL/kg)    1000 (7.1)    Urine (mL/kg/hr)    300 (0.3)    Blood    50    Total Output    350    Net    +650            Urine Unmeasured Occurrence   3 x         Lines, Drains & Airways    Active LDAs     Name:   Placement date:   Placement time: "   Site:   Days:    Peripheral IV 02/09/21 1131 Right;Posterior Forearm   02/09/21    1131    Forearm   1                  Physical Exam:    Physical Exam  Vitals signs and nursing note reviewed.   Constitutional:       General: She is not in acute distress.     Appearance: Normal appearance. She is well-developed. She is obese. She is not ill-appearing, toxic-appearing or diaphoretic.   HENT:      Head: Normocephalic and atraumatic.      Nose: Nose normal. No congestion or rhinorrhea.      Mouth/Throat:      Mouth: Mucous membranes are moist.      Pharynx: No oropharyngeal exudate.   Eyes:      General: No scleral icterus.        Right eye: No discharge.         Left eye: No discharge.      Extraocular Movements: Extraocular movements intact.      Conjunctiva/sclera: Conjunctivae normal.      Pupils: Pupils are equal, round, and reactive to light.   Neck:      Musculoskeletal: Normal range of motion and neck supple. No neck rigidity or muscular tenderness.      Thyroid: No thyromegaly.      Vascular: No carotid bruit or JVD.      Trachea: No tracheal deviation.   Cardiovascular:      Rate and Rhythm: Normal rate and regular rhythm.      Pulses: Normal pulses.      Heart sounds: Normal heart sounds. No murmur. No friction rub. No gallop.    Pulmonary:      Effort: Pulmonary effort is normal. No respiratory distress.      Breath sounds: Normal breath sounds. No stridor. No wheezing, rhonchi or rales.   Chest:      Chest wall: No tenderness.   Abdominal:      General: Bowel sounds are normal. There is no distension.      Palpations: Abdomen is soft. There is no mass.      Tenderness: There is no abdominal tenderness. There is no guarding or rebound.      Hernia: No hernia is present.   Musculoskeletal: Normal range of motion.         General: Tenderness present. No swelling, deformity or signs of injury.      Right lower leg: No edema.      Left lower leg: No edema.   Lymphadenopathy:      Cervical: No cervical  adenopathy.   Skin:     General: Skin is warm and dry.      Coloration: Skin is not jaundiced or pale.      Findings: Bruising present. No erythema or rash.   Neurological:      General: No focal deficit present.      Mental Status: She is alert and oriented to person, place, and time. Mental status is at baseline.      Cranial Nerves: No cranial nerve deficit.      Sensory: No sensory deficit.      Motor: No weakness or abnormal muscle tone.      Coordination: Coordination normal.   Psychiatric:         Mood and Affect: Mood normal.         Behavior: Behavior normal.         Thought Content: Thought content normal.              Wounds (last 24 hours)      LDA Wound     Row Name 02/10/21 1104 02/10/21 1021 02/10/21 1006       Wound 02/09/21 1441 anterior hip    Wound - Properties Group Placement Date: 02/09/21  -CH Placement Time: 1441  -CH Orientation: anterior  -CH Location: hip  -CH    Retired Wound - Properties Group Date first assessed: 02/09/21  -CH Time first assessed: 1441  -CH Location: hip  -CH       Wound 02/09/21 1441 Left lower sternal    Wound - Properties Group Placement Date: 02/09/21  -CH Placement Time: 1441  -CH Side: Left  -CH Orientation: lower  -CH Location: sternal  -CH    Retired Wound - Properties Group Date first assessed: 02/09/21  -CH Time first assessed: 1441  -CH Side: Left  -CH Location: sternal  -CH       Wound 02/09/21 1448 lower thoracic spine    Wound - Properties Group Placement Date: 02/09/21  -TL Placement Time: 1448  -TL Orientation: lower  -TL Location: thoracic spine  -TL Additional Comments: states an ulcer  that comes and goes  -TL    Retired Wound - Properties Group Date first assessed: 02/09/21  -TL Time first assessed: 1448  -TL Location: thoracic spine  -TL Additional Comments: states an ulcer  that comes and goes  -TL       Wound 02/09/21 1457 lower thoracic spine    Wound - Properties Group Placement Date: 02/09/21  -CH Placement Time: 1457  -CH Orientation: lower   -CH Location: thoracic spine  -CH    Retired Wound - Properties Group Date first assessed: 02/09/21  -CH Time first assessed: 1457  -CH Location: thoracic spine  -CH       Wound 02/09/21 2110 medial coccyx Other (comment)    Wound - Properties Group Placement Date: 02/09/21  -AB Placement Time: 2110  -AB Present on Hospital Admission: Y  -AB Orientation: medial  -AB Location: coccyx  -AB Primary Wound Type: Other  -AB, MASD  Additional Comments: Blanchable redness  -AB    Retired Wound - Properties Group Date first assessed: 02/09/21  -AB Time first assessed: 2110  -AB Present on Hospital Admission: Y  -AB Location: coccyx  -AB Primary Wound Type: Other  -AB, MASD  Additional Comments: Blanchable redness  -AB       Wound 02/10/21 0800 Left anterior shoulder Incision    Wound - Properties Group Placement Date: 02/10/21  -TR Placement Time: 0800  -TR Present on Hospital Admission: Y  -TR Side: Left  -TR Orientation: anterior  -TR Location: shoulder  -TR Primary Wound Type: Incision  -TR    Dressing Appearance  dry;intact;no drainage  -LB  dry;intact;no drainage  -MK  dry;intact;no drainage  -MK    Closure  HELGA  -LB  HELGA  -MK  HELGA  -MK    Retired Wound - Properties Group Date first assessed: 02/10/21  -TR Time first assessed: 0800  -TR Present on Hospital Admission: Y  -TR Side: Left  -TR Location: shoulder  -TR Primary Wound Type: Incision  -TR    Row Name 02/10/21 0951 02/10/21 0936 02/10/21 0921       Wound 02/09/21 1441 anterior hip    Wound - Properties Group Placement Date: 02/09/21  -CH Placement Time: 1441  -CH Orientation: anterior  -CH Location: hip  -CH    Retired Wound - Properties Group Date first assessed: 02/09/21  -CH Time first assessed: 1441  -CH Location: hip  -CH       Wound 02/09/21 1441 Left lower sternal    Wound - Properties Group Placement Date: 02/09/21  -CH Placement Time: 1441  -CH Side: Left  -CH Orientation: lower  -CH Location: sternal  -CH    Retired Wound - Properties Group Date first  assessed: 02/09/21  -CH Time first assessed: 1441  -CH Side: Left  -CH Location: sternal  -CH       Wound 02/09/21 1448 lower thoracic spine    Wound - Properties Group Placement Date: 02/09/21  -TL Placement Time: 1448  -TL Orientation: lower  -TL Location: thoracic spine  -TL Additional Comments: states an ulcer  that comes and goes  -TL    Retired Wound - Properties Group Date first assessed: 02/09/21  -TL Time first assessed: 1448  -TL Location: thoracic spine  -TL Additional Comments: states an ulcer  that comes and goes  -TL       Wound 02/09/21 1457 lower thoracic spine    Wound - Properties Group Placement Date: 02/09/21  -CH Placement Time: 1457  -CH Orientation: lower  -CH Location: thoracic spine  -CH    Retired Wound - Properties Group Date first assessed: 02/09/21  -CH Time first assessed: 1457  -CH Location: thoracic spine  -CH       Wound 02/09/21 2110 medial coccyx Other (comment)    Wound - Properties Group Placement Date: 02/09/21  -AB Placement Time: 2110 -AB Present on Hospital Admission: Y  -AB Orientation: medial  -AB Location: coccyx  -AB Primary Wound Type: Other  -AB, MASD  Additional Comments: Blanchable redness  -AB    Retired Wound - Properties Group Date first assessed: 02/09/21  -AB Time first assessed: 2110 -AB Present on Hospital Admission: Y  -AB Location: coccyx  -AB Primary Wound Type: Other  -AB, MASD  Additional Comments: Blanchable redness  -AB       Wound 02/10/21 0800 Left anterior shoulder Incision    Wound - Properties Group Placement Date: 02/10/21  -TR Placement Time: 0800  -TR Present on Hospital Admission: Y  -TR Side: Left  -TR Orientation: anterior  -TR Location: shoulder  -TR Primary Wound Type: Incision  -TR    Dressing Appearance  dry;intact;no drainage  -MK  dry;intact;no drainage  -MK  dry;intact;no drainage  -MK    Closure  HELGA  -MK  HELGA  -MK  HELGA  -MK    Retired Wound - Properties Group Date first assessed: 02/10/21  -TR Time first assessed: 0800  -TR Present  on Hospital Admission: Y  -TR Side: Left  -TR Location: shoulder  -TR Primary Wound Type: Incision  -TR    Row Name 02/10/21 0906 02/09/21 2047 02/09/21 1457       Wound 02/09/21 1441 anterior hip    Wound - Properties Group Placement Date: 02/09/21  -CH Placement Time: 1441  -CH Orientation: anterior  -CH Location: hip  -CH    Dressing Appearance  --  open to air  -AB  --    Closure  --  None  -AB  --    Base  --  non-blanchable;red excoriation  -AB  --    Drainage Amount  --  none  -AB  --    Retired Wound - Properties Group Date first assessed: 02/09/21  -CH Time first assessed: 1441  -CH Location: hip  -CH       Wound 02/09/21 1441 Left lower sternal    Wound - Properties Group Placement Date: 02/09/21  -CH Placement Time: 1441  -CH Side: Left  -CH Orientation: lower  -CH Location: sternal  -CH    Dressing Appearance  --  open to air  -AB  --    Closure  --  None  -AB  --    Base  --  moist;red  -AB  --    Drainage Amount  --  none  -AB  --    Retired Wound - Properties Group Date first assessed: 02/09/21  -CH Time first assessed: 1441  -CH Side: Left  -CH Location: sternal  -CH       Wound 02/09/21 1448 lower thoracic spine    Wound - Properties Group Placement Date: 02/09/21  -TL Placement Time: 1448  -TL Orientation: lower  -TL Location: thoracic spine  -TL Additional Comments: states an ulcer  that comes and goes  -TL    Wound Image  --  --  Images linked: 1  -CH    Dressing Appearance  --  open to air  -AB  --    Closure  --  Open to air  -AB  --    Drainage Amount  --  none  -AB  --    Retired Wound - Properties Group Date first assessed: 02/09/21  -TL Time first assessed: 1448  -TL Location: thoracic spine  -TL Additional Comments: states an ulcer  that comes and goes  -TL       Wound 02/09/21 1457 lower thoracic spine    Wound - Properties Group Placement Date: 02/09/21  -CH Placement Time: 1457  -CH Orientation: lower  -CH Location: thoracic spine  -CH    Dressing Appearance  --  open to air  -AB  --     Closure  --  None  -AB  --    Drainage Amount  --  none  -AB  --    Retired Wound - Properties Group Date first assessed: 02/09/21  -CH Time first assessed: 1457  -CH Location: thoracic spine  -CH       Wound 02/09/21 2110 medial coccyx Other (comment)    Wound - Properties Group Placement Date: 02/09/21  -AB Placement Time: 2110  -AB Present on Hospital Admission: Y  -AB Orientation: medial  -AB Location: coccyx  -AB Primary Wound Type: Other  -AB, MASD  Additional Comments: Blanchable redness  -AB    Dressing Appearance  --  open to air  -AB  --    Closure  --  None  -AB  --    Base  --  blanchable;red;moist  -AB  --    Drainage Amount  --  none  -AB  --    Retired Wound - Properties Group Date first assessed: 02/09/21  -AB Time first assessed: 2110 -AB Present on Hospital Admission: Y  -AB Location: coccyx  -AB Primary Wound Type: Other  -AB, MASD  Additional Comments: Blanchable redness  -AB       Wound 02/10/21 0800 Left anterior shoulder Incision    Wound - Properties Group Placement Date: 02/10/21  -TR Placement Time: 0800  -TR Present on Hospital Admission: Y  -TR Side: Left  -TR Orientation: anterior  -TR Location: shoulder  -TR Primary Wound Type: Incision  -TR    Dressing Care  dressing applied Staples, Fluffs, ABD, Foam Tape, and Ultra Sling  -TR  --  --    Retired Wound - Properties Group Date first assessed: 02/10/21  -TR Time first assessed: 0800  -TR Present on Hospital Admission: Y  -TR Side: Left  -TR Location: shoulder  -TR Primary Wound Type: Incision  -TR    Row Name 02/09/21 1442 02/09/21 1441          Wound 02/09/21 1441 anterior hip    Wound - Properties Group Placement Date: 02/09/21  -CH Placement Time: 1441  -CH Orientation: anterior  -CH Location: hip  -CH    Wound Image  --  Images linked: 1  -CH     Dressing Appearance  --  open to air  -CH     Base  --  non-blanchable;red  -CH     Red (%), Wound Tissue Color  --  100  -CH     Periwound  --  redness;blanchable;excoriated;moist  -CH      Periwound Temperature  --  warm  -CH     Drainage Amount  --  none  -CH     Dressing Care  --  open to air  -CH     Periwound Care  --  dry periwound area maintained  -CH     Retired Wound - Properties Group Date first assessed: 02/09/21  -CH Time first assessed: 1441  -CH Location: hip  -CH       Wound 02/09/21 1441 Left lower sternal    Wound - Properties Group Placement Date: 02/09/21  -CH Placement Time: 1441  -CH Side: Left  -CH Orientation: lower  -CH Location: sternal  -CH    Wound Image  Images linked: 1  -CH  --     Dressing Appearance  --  open to air  -CH     Base  --  blanchable;moist;red  -CH     Red (%), Wound Tissue Color  --  100  -CH     Periwound  --  blanchable;moist;redness;excoriated  -CH     Periwound Temperature  --  warm  -CH     Drainage Amount  --  none  -CH     Dressing Care  --  open to air  -CH     Periwound Care  --  dry periwound area maintained  -CH     Retired Wound - Properties Group Date first assessed: 02/09/21  -CH Time first assessed: 1441  -CH Side: Left  -CH Location: sternal  -CH       Wound 02/09/21 1448 lower thoracic spine    Wound - Properties Group Placement Date: 02/09/21  -TL Placement Time: 1448  -TL Orientation: lower  -TL Location: thoracic spine  -TL Additional Comments: states an ulcer  that comes and goes  -TL    Dressing Appearance  --  open to air  -CH     Closure  --  Open to air  -CH     Base  --  white;moist;red;yellow;black eschar  -CH     Periwound  --  warm;redness  -CH     Periwound Temperature  --  warm  -CH     Dressing Care  --  open to air  -CH     Periwound Care  --  dry periwound area maintained  -CH     Retired Wound - Properties Group Date first assessed: 02/09/21  -TL Time first assessed: 1448  -TL Location: thoracic spine  -TL Additional Comments: states an ulcer  that comes and goes  -TL       Wound 02/09/21 1457 lower thoracic spine    Wound - Properties Group Placement Date: 02/09/21  -CH Placement Time: 1457  -CH Orientation: lower  -CH  Location: thoracic spine  -    Retired Wound - Properties Group Date first assessed: 02/09/21  - Time first assessed: 1457 -CH Location: thoracic spine  -      User Key  (r) = Recorded By, (t) = Taken By, (c) = Cosigned By    Initials Name Provider Type    Itzel Mathew LPN Licensed Nurse    Kanwal Sanders, RN Registered Nurse    Nga Pruitt, RN Registered Nurse    Pablito Higginbotham, RN Registered Nurse    Enrike Escobedo RN Registered Nurse    Padmini Gonsales RN Registered Nurse          Procedures:    Procedure(s):  HUMERUS PROXIMAL OPEN REDUCTION INTERNAL FIXATION          Results Review:     I reviewed the patient's new clinical results.      Lab Results (last 24 hours)     Procedure Component Value Units Date/Time    POC Glucose Once [[id number]]  (Abnormal) Collected: 02/10/21 1101    Specimen: Blood Updated: 02/10/21 1102     Glucose 282 mg/dL      Comment: Serial Number: [id number]:  [id number]       POC Glucose Once [[id number]]  (Abnormal) Collected: 02/10/21 0926    Specimen: Blood Updated: 02/10/21 0927     Glucose 292 mg/dL      Comment: Serial Number: [id number]:  [id number]       Basic Metabolic Panel [[id number]]  (Abnormal) Collected: 02/10/21 0602    Specimen: Blood Updated: 02/10/21 0715     Glucose 271 mg/dL      BUN 9 mg/dL      Creatinine 0.65 mg/dL      Sodium 135 mmol/L      Potassium 3.7 mmol/L      Chloride 99 mmol/L      CO2 25.0 mmol/L      Calcium 9.0 mg/dL      eGFR Non African Amer 96 mL/min/1.73      BUN/Creatinine Ratio 13.8     Anion Gap 11.0 mmol/L     Narrative:      GFR Normal >60  Chronic Kidney Disease <60  Kidney Failure <15      Magnesium [[id number]]  (Normal) Collected: 02/10/21 0602    Specimen: Blood Updated: 02/10/21 0715     Magnesium 1.8 mg/dL     POC Glucose Once [[id number]]  (Abnormal) Collected: 02/10/21 0713    Specimen: Blood Updated: 02/10/21 0714     Glucose 318 mg/dL      Comment: Serial Number:  [id number]:  [id number]       CBC Auto Differential [[id number]]  (Abnormal) Collected: 02/10/21 0602    Specimen: Blood Updated: 02/10/21 0646     WBC 9.60 10*3/mm3      RBC 4.80 10*6/mm3      Hemoglobin 14.1 g/dL      Hematocrit 42.2 %      MCV 87.9 fL      MCH 29.4 pg      MCHC 33.5 g/dL      RDW 12.9 %      RDW-SD 39.8 fl      MPV 9.6 fL      Platelets 183 10*3/mm3      Neutrophil % 49.5 %      Lymphocyte % 39.7 %      Monocyte % 7.2 %      Eosinophil % 2.5 %      Basophil % 1.1 %      Neutrophils, Absolute 4.70 10*3/mm3      Lymphocytes, Absolute 3.80 10*3/mm3      Monocytes, Absolute 0.70 10*3/mm3      Eosinophils, Absolute 0.20 10*3/mm3      Basophils, Absolute 0.10 10*3/mm3      nRBC 0.0 /100 WBC     POC Glucose Once [[id number]]  (Abnormal) Collected: 02/10/21 0630    Specimen: Blood Updated: 02/10/21 0635     Glucose 297 mg/dL      Comment: Serial Number: [id number]:  [id number]       Hemoglobin A1c [[id number]]  (Abnormal) Collected: 02/09/21 1132    Specimen: Blood Updated: 02/09/21 1838     Hemoglobin A1C 11.4 %     Narrative:      Hemoglobin A1C Reference Range:    <5.7 %        Normal  5.7-6.4 %     Increased risk for diabetes  > 6.4 %        Diabetes       These guidelines have been recommended by the American Diabetic Association for Hgb A1c.      The following 2010 guidelines have been recommended by the American Diabetes Association for Hemoglobin A1c.    HBA1c 5.7-6.4% Increased risk for future diabetes (pre-diabetes)  HBA1c     >6.4% Diabetes      COVID PRE-OP / PRE-PROCEDURE SCREENING ORDER (NO ISOLATION) - Swab, Nasopharynx [[id number]]  (Normal) Collected: 02/09/21 1731    Specimen: Swab from Nasopharynx Updated: 02/09/21 1833    Narrative:      The following orders were created for panel order COVID PRE-OP / PRE-PROCEDURE SCREENING ORDER (NO ISOLATION) - Swab, Nasopharynx.  Procedure                               Abnormality         Status                     ---------                                -----------         ------                     COVID-19,CEPHEID,COR/KENYETTA...[[id number]]  Normal              Final result                 Please view results for these tests on the individual orders.    COVID-19,CEPHEID,COR/KENYETTA/PAD IN-HOUSE(OR EMERGENT/ADD-ON),NP SWAB IN TRANSPORT MEDIA 3-4 HR TAT, RT-PCR - Swab, Nasopharynx [[id number]]  (Normal) Collected: 02/09/21 1731    Specimen: Swab from Nasopharynx Updated: 02/09/21 1833     COVID19 Not Detected    Narrative:      Fact sheet for providers: https://www.fda.gov/media/491447/download     Fact sheet for patients: https://www.fda.gov/media/408996/download    MRSA Screen, PCR (Inpatient) - Swab, Nares [[id number]]  (Normal) Collected: 02/09/21 1458    Specimen: Swab from Nares Updated: 02/09/21 1817     MRSA PCR No MRSA Detected    POC Glucose Once [[id number]]  (Abnormal) Collected: 02/09/21 1713    Specimen: Blood Updated: 02/09/21 1715     Glucose 367 mg/dL      Comment: Serial Number: [id number]:  [id number]       POC Glucose Once [[id number]]  (Abnormal) Collected: 02/09/21 1428    Specimen: Blood Updated: 02/09/21 1430     Glucose 306 mg/dL      Comment: Serial Number: [id number]:  [id number]           Hemoglobin A1C   Date Value Ref Range Status   02/09/2021 11.4 (H) 3.5 - 5.6 % Final     Results from last 7 days   Lab Units 02/09/21  1132   INR  0.98           No results found for: LIPASE  Lab Results   Component Value Date    CHOL 232 (H) 02/11/2019    TRIG 272 (H) 02/11/2019    HDL 33 (L) 02/11/2019     (H) 02/11/2019       No results found for: INTRAOP, PREDX, FINALDX, COMDX    Microbiology Results (last 10 days)     Procedure Component Value - Date/Time    COVID PRE-OP / PRE-PROCEDURE SCREENING ORDER (NO ISOLATION) - Swab, Nasopharynx [[id number]]  (Normal) Collected: 02/09/21 1731    Lab Status: Final result Specimen: Swab from Nasopharynx Updated: 02/09/21 1832    Narrative:      The following orders were created for panel  order COVID PRE-OP / PRE-PROCEDURE SCREENING ORDER (NO ISOLATION) - Swab, Nasopharynx.  Procedure                               Abnormality         Status                     ---------                               -----------         ------                     COVID-19,CEPHEID,COR/KENYETTA...[[id number]]  Normal              Final result                 Please view results for these tests on the individual orders.    COVID-19,CEPHEID,COR/KENYETTA/PAD IN-HOUSE(OR EMERGENT/ADD-ON),NP SWAB IN TRANSPORT MEDIA 3-4 HR TAT, RT-PCR - Swab, Nasopharynx [[id number]]  (Normal) Collected: 02/09/21 1731    Lab Status: Final result Specimen: Swab from Nasopharynx Updated: 02/09/21 1833     COVID19 Not Detected    Narrative:      Fact sheet for providers: https://www.fda.gov/media/003283/download     Fact sheet for patients: https://www.fda.gov/media/297784/download    MRSA Screen, PCR (Inpatient) - Swab, Nares [[id number]]  (Normal) Collected: 02/09/21 1458    Lab Status: Final result Specimen: Swab from Nares Updated: 02/09/21 1817     MRSA PCR No MRSA Detected          ECG/EMG Results (most recent)     Procedure Component Value Units Date/Time    ECG 12 Lead [[id number]] Collected: 02/09/21 1135     Updated: 02/09/21 1137     QT Interval 379 ms     Narrative:      HEART RATE= 75  bpm  RR Interval= 800  ms  AL Interval= 201  ms  P Horizontal Axis= -19  deg  P Front Axis= 58  deg  QRSD Interval= 78  ms  QT Interval= 379  ms  QRS Axis= 32  deg  T Wave Axis= 47  deg  - OTHERWISE NORMAL ECG -  Sinus rhythm  Low voltage, precordial leads  When compared with ECG of 10-Pop-2020 3:02:06,  Significant axis, voltage or hypertrophy change  Electronically Signed By:   Date and Time of Study: 2021-02-09 11:35:34    ECG 12 Lead [[id number]] Resulted: 02/09/21 1333     Updated: 02/09/21 1333                    Xr Shoulder 2+ View Left    Result Date: 2/9/2021   1. Comminuted displaced fracture of the proximal humerus, as above. 2. Mild DJD at the  glenohumeral joint and acromioclavicular joint. 3. Soft tissue swelling posterior to the elbow without radiographic evidence of acute fracture or dislocation of the elbow. 4. Small lateral epicondyle enthesophyte.  Electronically Signed By-Matthew Gomez MD On:2/9/2021 11:37 AM This report was finalized on [id number] by  Matthew Gomez MD.    Xr Humerus Left    Result Date: 2/9/2021   1. Comminuted displaced fracture of the proximal humerus, as above. 2. Mild DJD at the glenohumeral joint and acromioclavicular joint. 3. Soft tissue swelling posterior to the elbow without radiographic evidence of acute fracture or dislocation of the elbow. 4. Small lateral epicondyle enthesophyte.  Electronically Signed By-Matthew Gomez MD On:2/9/2021 11:37 AM This report was finalized on [id number] by  Matthew Gomez MD.    Xr Elbow 3+ View Left    Result Date: 2/9/2021   1. Comminuted displaced fracture of the proximal humerus, as above. 2. Mild DJD at the glenohumeral joint and acromioclavicular joint. 3. Soft tissue swelling posterior to the elbow without radiographic evidence of acute fracture or dislocation of the elbow. 4. Small lateral epicondyle enthesophyte.  Electronically Signed By-Matthew Gomez MD On:2/9/2021 11:37 AM This report was finalized on [id number] by  Matthew Gomez MD.    Ct Head Without Contrast    Result Date: 2/9/2021  No acute intracranial hemorrhage or mass/mass effect.  Electronically Signed By-Matthew Gomez MD On:2/9/2021 1:20 PM This report was finalized on [id number] by  Matthew Gomez MD.    Ct Cervical Spine Without Contrast    Result Date: 2/9/2021  1. No evidence of acute fracture or traumatic subluxation. 2. Degenerative disc disease most notably at C5-C6 and C6-C7 without evidence of significant spinal canal stenosis or neuroforaminal narrowing.  Electronically Signed By-Robles Douglas MD On:2/9/2021 12:14 PM This report was finalized on [id number] by  Robles Douglas MD.    Xr Chest 1  View    Result Date: 2/9/2021  1. No acute cardiopulmonary abnormality.  Electronically Signed By-Robles Douglas MD On:2/9/2021 11:37 AM This report was finalized on [id number] by  Robles Douglas MD.    Ct Upper Extremity Left Without Contrast    Result Date: 2/9/2021   1. Comminuted displaced fracture of proximal humerus, as above. 2. Lobulated calcification adjacent to the greater tuberosity fracture fragment, most suspicious for calcific rotator cuff tendinopathy. 3. Large glenohumeral joint effusion with small intra-articular calcifications. 4. Mild age-appropriate DJD at the AC joint.  Electronically Signed By-Matthew Gomez MD On:2/9/2021 12:36 PM This report was finalized on [id number] by  Matthew Gomez MD.          Xrays, labs reviewed personally by physician.    Medication Review:   I have reviewed the patient's current medication list      Scheduled Meds  budesonide-formoterol, 2 puff, Inhalation, BID - RT  ceFAZolin, 2 g, Intravenous, Q8H  docusate sodium, 100 mg, Oral, Daily  [START ON 2/12/2021] enoxaparin, 30 mg, Subcutaneous, Daily  gabapentin, 100 mg, Oral, Nightly  insulin glargine, 22 Units, Subcutaneous, Nightly  insulin lispro, 0-7 Units, Subcutaneous, TID AC  nebivolol, 10 mg, Oral, Daily  nicotine, 1 patch, Transdermal, Q24H  nystatin, , Topical, Q12H  QUEtiapine, 200 mg, Oral, Nightly  sodium chloride, 10 mL, Intravenous, Q12H        Meds Infusions       Meds PRN  •  acetaminophen **OR** acetaminophen  •  aluminum-magnesium hydroxide-simethicone  •  dextrose  •  dextrose  •  diazePAM  •  diphenhydrAMINE  •  glucagon (human recombinant)  •  HYDROcodone-acetaminophen  •  HYDROcodone-acetaminophen  •  HYDROmorphone **AND** naloxone  •  hydrOXYzine  •  insulin lispro **AND** insulin lispro  •  magnesium hydroxide  •  magnesium sulfate **OR** magnesium sulfate in D5W 1g/100mL (PREMIX)  •  ondansetron  •  ondansetron  •  potassium chloride  •  potassium chloride  •  sodium  chloride        Assessment/Plan   Assessment/Plan     Active Hospital Problems    Diagnosis  POA   • **Closed fracture of proximal end of left humerus [S42.202A]  Yes   • Fall [W19.XXXA]  Yes   • Diabetes mellitus (CMS/[facility]) [E11.9]  Yes   • Syncope without other cardiovascular symptoms [R55, R09.89]  Yes   • COPD (chronic obstructive pulmonary disease) (CMS/[facility]) [J44.9]  Yes   • Chronic back pain [M54.9, G89.29]  Yes   • Essential hypertension [I10]  Yes   • Anxiety [F41.9]  Yes   • GERD without esophagitis [K21.9]  Yes      Resolved Hospital Problems   No resolved problems to display.       MEDICAL DECISION MAKING COMPLEXITY BY PROBLEM:   Comminuted displaced fracture of the proximal humerus, Left  -XR left shoulder showed comminuted displaced fracture of the proximal humerus.  Soft tissue swelling posterior to the elbow without radiographic evidence of acute fracture dislocation of the elbow.  Mild DJD at the glenohumeral joint and acromioclavicular joint.    -CT left upper extremity showed comminuted displaced fracture proximal humerus.  Lobulated calcification adjacent to the greater tuberosity fracture fragment, most suspicious for calcific rotator cuff tendinopathy.  Large glenohumeral joint effusion with small intra-articular calcifications.   -after a fall  -sling in place  -ortho consulted, s/p open reduction internal fixation left shoulder  -pain control, pt stated she has had hydrocodone in the past without problems but is allergic to Tylenol 3     Fall  -slip and fall per patient report  -CT head without acute intracranial findings  -CT C-spine without evidence of acute fracture  -EKG sinus rhythm      Seizure  -pt denied any syncope. She stated she has chronic cardiovascular syncope/seizures that has been worked up extensively in the past  -she is not on seizure medications  -no neurological deficits on exam     DM Type II w/ hyperglycemia  -cont home lantus, give dose now as patient's glucose is 300s  and she did not take her lantus last night  -SSI AC/HS  -cont home neurontin for peripheral neuropathy     COPD w/ tobacco abuse  -encourage cessation     Hypertension  -cont home bystolic     Anxiety  -cont home Seroquel, prn hydroxyzine     Chronic back pain  -on neurontin      VTE Prophylaxis -   Mechanical Order History:      Ordered        02/09/21 1436  Place Sequential Compression Device  Once         02/09/21 1436  Maintain Sequential Compression Device  Continuous,   Status:  Canceled         02/09/21 1437  Place Sequential Compression Device  Once         02/09/21 1437  Maintain Sequential Compression Device  Continuous,   Status:  Canceled                 Pharmalogical Order History:      Ordered     Dose Route Frequency Stop    02/10/21 1102  enoxaparin (LOVENOX) syringe 30 mg      30 mg SC Daily --                  Code Status -   Code Status and Medical Interventions:   Ordered at: 02/09/21 1436     Level Of Support Discussed With:    Patient     Code Status:    CPR     Medical Interventions (Level of Support Prior to Arrest):    Full       This patient has been examined wearing appropriate Personal Protective Equipment and discussed with rn. 02/10/21        Discharge Planning  nh        Electronically signed by Armen Peralta MD, 02/10/21, 14:20 EST.  [facility] Team

## 2021-02-10 NOTE — ANESTHESIA POSTPROCEDURE EVALUATION
Patient: Serina Powell    Procedure Summary     Date: 02/10/21 Room / Location: [facility] OR 11 / [facility] MAIN OR    Anesthesia Start: 0725 Anesthesia Stop: 0917    Procedure: HUMERUS PROXIMAL OPEN REDUCTION INTERNAL FIXATION (Left Arm Upper) Diagnosis:       Fracture, humerus, proximal      (Fracture, humerus, proximal [S42.209A])    Surgeon: Julián Tinoco MD Provider: Renetta Lima MD    Anesthesia Type: general ASA Status: 3          Anesthesia Type: general    Vitals  Vitals Value Taken Time   /59 02/10/21 1028   Temp 97.5 °F (36.4 °C) 02/10/21 0921   Pulse 63 02/10/21 1030   Resp 13 02/10/21 0951   SpO2 98 % 02/10/21 1030   Vitals shown include unvalidated device data.        Post Anesthesia Care and Evaluation    Patient location during evaluation: PACU  Patient participation: complete - patient participated  Level of consciousness: awake  Pain management: adequate  Airway patency: patent  Anesthetic complications: No anesthetic complications  PONV Status: controlled  Cardiovascular status: acceptable  Respiratory status: acceptable  Hydration status: acceptable

## 2021-02-10 NOTE — ANESTHESIA PREPROCEDURE EVALUATION
Anesthesia Evaluation     Patient summary reviewed and Nursing notes reviewed   no history of anesthetic complications:  NPO Solid Status: > 8 hours  NPO Liquid Status: > 8 hours           Airway   Mallampati: II  TM distance: >3 FB  Neck ROM: full  No difficulty expected  Dental      Pulmonary     breath sounds clear to auscultation  (+) COPD,   Cardiovascular     ECG reviewed  Rhythm: regular  Rate: normal    (+) hypertension,       Neuro/Psych- negative ROS  GI/Hepatic/Renal/Endo    (+) morbid obesity,  diabetes mellitus type 2 poorly controlled using insulin,     Musculoskeletal (-) negative ROS    Abdominal     Abdomen: soft.   Substance History - negative use     OB/GYN negative ob/gyn ROS         Other - negative ROS                       Anesthesia Plan    ASA 3     general     intravenous induction     Anesthetic plan, all risks, benefits, and alternatives have been provided, discussed and informed consent has been obtained with: patient.    Plan discussed with CAA.

## 2021-02-10 NOTE — CONSULTS
Diabetes Education    Patient Name:  Serina Powell  YOB: 1970  MRN: [id number]  Admit Date:  2/9/2021        On 2/9/2021 A1c was 11.4%. A1c info sheet given with discussion on A1c target and healthy blood sugar range. Patient stated that she has a glucometer that is about 3 years old and she checks her blood sugars every other day with results in the 200s. Patient stated that she takes Metformin 1,000 mg BID and Basaglar 22 units at bedtime at home. She continued to state that her insulin has not been adjusted in a while.  Encouraged patient to discuss A1c result with doctor for med adjustment.  Patient stated that she drinks regular Sprite.  Discussed with patient about trying to drink unsweetened beverages and exercising to help lower blood sugar.  Also discussed the importance of good blood sugar control in the healing process. Patient has no further questions or concerns related to diabetes at this time.      Electronically signed by:  Didi Salinas RN  02/10/21 15:48 EST

## 2021-02-10 NOTE — PLAN OF CARE
Goal Outcome Evaluation:  Plan of Care Reviewed With: patient  Progress: no change   Patient transferred from observation unit, scheduled for surgery this AM with Dr Tinoco for ORIF of Left Humerus.  Patient refuses to sign consent until she speaks to Dr Tinoco.  VSS Pain tolerable with PO pain medication.  Will continue to monitor

## 2021-02-10 NOTE — ANESTHESIA PROCEDURE NOTES
Airway  Urgency: elective    Date/Time: 2/10/2021 7:30 AM  Airway not difficult    General Information and Staff    Patient location during procedure: OR    Indications and Patient Condition  Indications for airway management: airway protection    Preoxygenated: yes  Mask difficulty assessment: 1 - vent by mask    Final Airway Details  Final airway type: endotracheal airway      Successful airway: ETT  Cuffed: yes   Successful intubation technique: direct laryngoscopy  Facilitating devices/methods: intubating stylet  Endotracheal tube insertion site: oral  Blade: Lucero  Blade size: 3  ETT size (mm): 7.0  Cormack-Lehane Classification: grade I - full view of glottis  Placement verified by: chest auscultation and capnometry   Measured from: gums  ETT/EBT to gums (cm): 22  Number of attempts at approach: 1  Assessment: lips, teeth, and gum same as pre-op and atraumatic intubation

## 2021-02-11 VITALS
SYSTOLIC BLOOD PRESSURE: 161 MMHG | HEART RATE: 72 BPM | RESPIRATION RATE: 14 BRPM | DIASTOLIC BLOOD PRESSURE: 84 MMHG | WEIGHT: 293 LBS | OXYGEN SATURATION: 98 % | BODY MASS INDEX: 48.82 KG/M2 | HEIGHT: 65 IN | TEMPERATURE: 97.6 F

## 2021-02-11 LAB
ANION GAP SERPL CALCULATED.3IONS-SCNC: 11 MMOL/L (ref 5–15)
BUN SERPL-MCNC: 10 MG/DL (ref 6–20)
BUN/CREAT SERPL: 17.9 (ref 7–25)
CALCIUM SPEC-SCNC: 8.7 MG/DL (ref 8.6–10.5)
CHLORIDE SERPL-SCNC: 100 MMOL/L (ref 98–107)
CO2 SERPL-SCNC: 21 MMOL/L (ref 22–29)
CREAT SERPL-MCNC: 0.56 MG/DL (ref 0.57–1)
GFR SERPL CREATININE-BSD FRML MDRD: 114 ML/MIN/1.73
GLUCOSE BLDC GLUCOMTR-MCNC: 284 MG/DL (ref 70–105)
GLUCOSE SERPL-MCNC: 270 MG/DL (ref 65–99)
HCT VFR BLD AUTO: 39.3 % (ref 34–46.6)
HGB BLD-MCNC: 13.3 G/DL (ref 12–15.9)
POTASSIUM SERPL-SCNC: 4 MMOL/L (ref 3.5–5.2)
SODIUM SERPL-SCNC: 132 MMOL/L (ref 136–145)

## 2021-02-11 PROCEDURE — 99217 PR OBSERVATION CARE DISCHARGE MANAGEMENT: CPT | Performed by: INTERNAL MEDICINE

## 2021-02-11 PROCEDURE — 97530 THERAPEUTIC ACTIVITIES: CPT

## 2021-02-11 PROCEDURE — 80048 BASIC METABOLIC PNL TOTAL CA: CPT | Performed by: ORTHOPAEDIC SURGERY

## 2021-02-11 PROCEDURE — 97110 THERAPEUTIC EXERCISES: CPT

## 2021-02-11 PROCEDURE — G0378 HOSPITAL OBSERVATION PER HR: HCPCS

## 2021-02-11 PROCEDURE — 63710000001 INSULIN LISPRO (HUMAN) PER 5 UNITS: Performed by: INTERNAL MEDICINE

## 2021-02-11 PROCEDURE — 82962 GLUCOSE BLOOD TEST: CPT

## 2021-02-11 PROCEDURE — 97535 SELF CARE MNGMENT TRAINING: CPT

## 2021-02-11 PROCEDURE — 97166 OT EVAL MOD COMPLEX 45 MIN: CPT

## 2021-02-11 PROCEDURE — 85018 HEMOGLOBIN: CPT | Performed by: ORTHOPAEDIC SURGERY

## 2021-02-11 PROCEDURE — 85014 HEMATOCRIT: CPT | Performed by: ORTHOPAEDIC SURGERY

## 2021-02-11 PROCEDURE — 94799 UNLISTED PULMONARY SVC/PX: CPT

## 2021-02-11 PROCEDURE — 97162 PT EVAL MOD COMPLEX 30 MIN: CPT

## 2021-02-11 PROCEDURE — 25010000002 CEFAZOLIN PER 500 MG: Performed by: ORTHOPAEDIC SURGERY

## 2021-02-11 RX ORDER — HYDROCODONE BITARTRATE AND ACETAMINOPHEN 10; 325 MG/1; MG/1
1 TABLET ORAL EVERY 4 HOURS PRN
Qty: 42 TABLET | Refills: 0 | Status: SHIPPED | OUTPATIENT
Start: 2021-02-11

## 2021-02-11 RX ADMIN — HYDROCODONE BITARTRATE AND ACETAMINOPHEN 1 TABLET: 10; 325 TABLET ORAL at 05:34

## 2021-02-11 RX ADMIN — BUDESONIDE AND FORMOTEROL FUMARATE DIHYDRATE 2 PUFF: 80; 4.5 AEROSOL RESPIRATORY (INHALATION) at 07:57

## 2021-02-11 RX ADMIN — CEFAZOLIN SODIUM 2 G: 10 INJECTION, POWDER, FOR SOLUTION INTRAVENOUS at 05:34

## 2021-02-11 RX ADMIN — INSULIN LISPRO 4 UNITS: 100 INJECTION, SOLUTION INTRAVENOUS; SUBCUTANEOUS at 07:46

## 2021-02-11 RX ADMIN — Medication 1 PATCH: at 09:10

## 2021-02-11 RX ADMIN — Medication 10 ML: at 09:11

## 2021-02-11 RX ADMIN — DOCUSATE SODIUM 100 MG: 100 CAPSULE, LIQUID FILLED ORAL at 09:10

## 2021-02-11 RX ADMIN — NEBIVOLOL HYDROCHLORIDE 10 MG: 10 TABLET ORAL at 09:10

## 2021-02-11 RX ADMIN — NYSTATIN: 100000 POWDER TOPICAL at 09:11

## 2021-02-11 NOTE — PROGRESS NOTES
"     LOS: 0 days   Patient Care Team:  Provider, No Known as PCP - General    Chief Complaint: Follow-up left shoulder    Subjective     Interval History:     History taken from: patient    Says she feels better since surgery  Objective     Vital Signs  Visit Vitals  /89 (BP Location: Right arm, Patient Position: Lying)   Pulse 72   Temp 97.9 °F (36.6 °C) (Oral)   Resp 16   Ht 165.1 cm (65\")   Wt (!) 140 kg (309 lb 8.4 oz)   SpO2 98%   BMI 51.51 kg/m²       Physical Exam:        General Appearance:   She is by her self.  Sitting up in bed.  Oriented to time place and person.   Extremities:  The dressing is intact.  I removed it.  Her incision looks good.   Pulses:  Pulse is regular   Skin:  Other than the skin incision her skin is intact.   Neurologic:  Good function of her median radial and ulnar nerve         Results Review:    CBC    Results from last 7 days   Lab Units 02/11/21  0253 02/10/21  0602 02/09/21  1132   WBC 10*3/mm3  --  9.60 9.30   HEMOGLOBIN g/dL 13.3 14.1 14.7   PLATELETS 10*3/mm3  --  183 206     BMP   Results from last 7 days   Lab Units 02/11/21  0253 02/10/21  0602 02/09/21  1132   SODIUM mmol/L 132* 135* 134*   POTASSIUM mmol/L 4.0 3.7 4.0   CHLORIDE mmol/L 100 99 97*   CO2 mmol/L 21.0* 25.0 25.0   BUN mg/dL 10 9 9   CREATININE mg/dL 0.56* 0.65 0.70   GLUCOSE mg/dL 270* 271* 321*   MAGNESIUM mg/dL  --  1.8 1.6     Infection     Radiology(recent) Xr Shoulder 2+ View Left    Result Date: 2/9/2021   1. Comminuted displaced fracture of the proximal humerus, as above. 2. Mild DJD at the glenohumeral joint and acromioclavicular joint. 3. Soft tissue swelling posterior to the elbow without radiographic evidence of acute fracture or dislocation of the elbow. 4. Small lateral epicondyle enthesophyte.  Electronically Signed By-Matthew Gomez MD On:2/9/2021 11:37 AM This report was finalized on [id number] by  Matthew Gomez MD.    Xr Humerus Left    Result Date: 2/9/2021   1. Comminuted displaced " fracture of the proximal humerus, as above. 2. Mild DJD at the glenohumeral joint and acromioclavicular joint. 3. Soft tissue swelling posterior to the elbow without radiographic evidence of acute fracture or dislocation of the elbow. 4. Small lateral epicondyle enthesophyte.  Electronically Signed By-Matthew Gomez MD On:2/9/2021 11:37 AM This report was finalized on [id number] by  Matthew Gomez MD.    Xr Elbow 3+ View Left    Result Date: 2/9/2021   1. Comminuted displaced fracture of the proximal humerus, as above. 2. Mild DJD at the glenohumeral joint and acromioclavicular joint. 3. Soft tissue swelling posterior to the elbow without radiographic evidence of acute fracture or dislocation of the elbow. 4. Small lateral epicondyle enthesophyte.  Electronically Signed By-Matthew Gomez MD On:2/9/2021 11:37 AM This report was finalized on [id number] by  Matthew Gomez MD.    Ct Head Without Contrast    Result Date: 2/9/2021  No acute intracranial hemorrhage or mass/mass effect.  Electronically Signed By-Matthew Gomez MD On:2/9/2021 1:20 PM This report was finalized on [id number] by  Matthew Gomez MD.    Ct Cervical Spine Without Contrast    Result Date: 2/9/2021  1. No evidence of acute fracture or traumatic subluxation. 2. Degenerative disc disease most notably at C5-C6 and C6-C7 without evidence of significant spinal canal stenosis or neuroforaminal narrowing.  Electronically Signed By-Robles Douglas MD On:2/9/2021 12:14 PM This report was finalized on [id number] by  Robles Douglas MD.    Xr Chest 1 View    Result Date: 2/9/2021  1. No acute cardiopulmonary abnormality.  Electronically Signed By-Robles Douglas MD On:2/9/2021 11:37 AM This report was finalized on [id number] by  Robles Douglas MD.    Ct Upper Extremity Left Without Contrast    Result Date: 2/9/2021   1. Comminuted displaced fracture of proximal humerus, as above. 2. Lobulated calcification adjacent to the greater tuberosity  fracture fragment, most suspicious for calcific rotator cuff tendinopathy. 3. Large glenohumeral joint effusion with small intra-articular calcifications. 4. Mild age-appropriate DJD at the AC joint.  Electronically Signed By-Matthew Gomez MD On:2/9/2021 12:36 PM This report was finalized on [id number] by  Matthew Gomez MD.      Imaging reviewed: No new orthopedic images taken since surgery yesterday.    Medication Review:   Scheduled Meds:budesonide-formoterol, 2 puff, Inhalation, BID - RT  docusate sodium, 100 mg, Oral, Daily  [START ON 2/12/2021] enoxaparin, 30 mg, Subcutaneous, Daily  gabapentin, 100 mg, Oral, Nightly  insulin glargine, 22 Units, Subcutaneous, Nightly  insulin lispro, 0-7 Units, Subcutaneous, TID AC  nebivolol, 10 mg, Oral, Daily  nicotine, 1 patch, Transdermal, Q24H  nystatin, , Topical, Q12H  QUEtiapine, 200 mg, Oral, Nightly  sodium chloride, 10 mL, Intravenous, Q12H      Continuous Infusions:   PRN Meds:.•  acetaminophen **OR** acetaminophen  •  aluminum-magnesium hydroxide-simethicone  •  dextrose  •  dextrose  •  diazePAM  •  diphenhydrAMINE  •  glucagon (human recombinant)  •  HYDROcodone-acetaminophen  •  HYDROcodone-acetaminophen  •  HYDROmorphone **AND** naloxone  •  hydrOXYzine  •  insulin lispro **AND** insulin lispro  •  magnesium hydroxide  •  magnesium sulfate **OR** magnesium sulfate in D5W 1g/100mL (PREMIX)  •  ondansetron  •  ondansetron  •  potassium chloride  •  potassium chloride  •  sodium chloride    Assessment/Plan       Closed fracture of proximal end of left humerus    Fall    Diabetes mellitus (CMS/HCC)    Syncope without other cardiovascular symptoms    COPD (chronic obstructive pulmonary disease) (CMS/HCC)    Chronic back pain    Essential hypertension    Anxiety    GERD without esophagitis    Impression #1 postop day #1 for ORIF left shoulder    Recommendations after physical therapy today she can go home.  She says she has plenty of help at home.  We will see her  in the office in 3 weeks.        Julián Tinoco MD  02/11/21  06:17 EST

## 2021-02-11 NOTE — DISCHARGE SUMMARY
Discharge summary  This lady is a 50-year-old female who fell and injured her left shoulder on February 9, 2021.  She came to the hospital for further evaluation and then was taken to surgery where surgery was performed according the operative note.  Postoperatively she did well.  She was begun on physical therapy pendulum exercises with the left arm was able to get out of bed on her own.  Her incision site was healing well with no signs of infection and she was afebrile.  She was tolerating regular diet well.  She was discharged home on a regular diet her prehospitalization medications as well as hydrocodone 10 1 p.o. every 4 hours as needed pain and was to continue physical therapy at home as prescribed in the hospital.  She was going to be able to get her left shoulder wet in a shower in 1 week from the time of discharge and was going to be seen in the office in 3 weeks or sooner if she had problems.

## 2021-02-11 NOTE — DISCHARGE INSTR - ACTIVITY
Can take a shower, but do not get shoulder wet for 1 week. No greater than 1 pound of pressure when lifting the left arm.

## 2021-02-11 NOTE — PLAN OF CARE
Goal Outcome Evaluation:   Patient is being discharged home today with home health. Follow-up appointment has been made.

## 2021-02-11 NOTE — PLAN OF CARE
Goal Outcome Evaluation:  Plan of Care Reviewed With: patient  Progress: improving   Patient seems to have less anxiety this shift, ambulating with stand by assist.  Pain has been tolerated with PO meds.  VSS will continue to monitor

## 2021-02-11 NOTE — DISCHARGE SUMMARY
[facility] Services  DISCHARGE SUMMARY        Prepared For PCP:  Provider, No Known    Patient Name: Serina Powell  : 1970  MRN: [id number]      Date of Admission:   2021    Date of Discharge:  2021    Length of stay:  LOS: 0 days     Hospital Course     Presenting Problem:   Seizure (CMS/HCC) [R56.9]  Near syncope [R55]  Pain of left upper extremity [M79.602]  Closed displaced fracture of surgical neck of left humerus, unspecified fracture morphology, initial encounter [S4A]      Active Hospital Problems    Diagnosis  POA   • **Closed fracture of proximal end of left humerus [S4]  Yes   • Fall [W19.XXXA]  Yes   • Diabetes mellitus (CMS/[facility]) [E11.9]  Yes   • Syncope without other cardiovascular symptoms [R55, R09.89]  Yes   • COPD (chronic obstructive pulmonary disease) (CMS/[facility]) [J44.9]  Yes   • Chronic back pain [M54.9, G89.29]  Yes   • Essential hypertension [I10]  Yes   • Anxiety [F41.9]  Yes   • GERD without esophagitis [K21.9]  Yes      Resolved Hospital Problems   No resolved problems to display.     Comminuted displaced fracture of the proximal humerus, Left  -XR left shoulder showed comminuted displaced fracture of the proximal humerus.  Soft tissue swelling posterior to the elbow without radiographic evidence of acute fracture dislocation of the elbow.  Mild DJD at the glenohumeral joint and acromioclavicular joint.    -CT left upper extremity showed comminuted displaced fracture proximal humerus.  Lobulated calcification adjacent to the greater tuberosity fracture fragment, most suspicious for calcific rotator cuff tendinopathy.  Large glenohumeral joint effusion with small intra-articular calcifications.   -after a fall  -sling in place  -ortho consulted, s/p open reduction internal fixation left shoulder  -pain control, pt stated she has had hydrocodone in the past without problems but is allergic to Tylenol 3     Fall-slip and fall per  "patient report  -CT head without acute intracranial findings  -CT C-spine without evidence of acute fracture  -EKG sinus rhythm      Seizure-pt denied any syncope. She stated she has chronic cardiovascular syncope/seizures that has been worked up extensively in the past  -she is not on seizure medications  -no neurological deficits on exam     DM Type II w/ hyperglycemia  -cont home lantus, give dose now as patient's glucose is 300s and she did not take her lantus last night  -SSI AC/HS  -cont home neurontin for peripheral neuropathy     COPD w/ tobacco abuse  -encourage cessation     Hypertension  -cont home bystolic     Anxiety  -cont home Seroquel, prn hydroxyzine     Chronic back pain  -on neurontin     Hospital Course:  Serina Powell is a 50 y.o. female with past medical history of cardiovascular syncope/seizures, diabetes mellitus type 2 with peripheral neuropathy, HTN, COPD with continued tobacco abuse, anxiety, chronic back pain, and GERD who presented to [facility] ED 2/9/2021 with complaints of left shoulder pain after a fall at home.  He stated she slipped on something with her sock at home and fell hitting the back of her head, her left shoulder, and her left elbow.  She stated she then had a seizure.  She lied on the floor until her seizure passed.  She stated her seizures are not typical seizures and she is awake when they occur. Her seizures vary each time and different parts of her body shake. She cannot describe these \"seizures\" very well. She stated she did not pass out. She is not on seizure medications. She denied any chest pain, cough, or shortness of breath.  She stated she went to bed last night after her fall and tried to sleep off her pain but the pain persisted this morning and she called EMS to bring her to the ER.     In the ER the patient had CT head that showed no acute intracranial hemorrhage or mass-effect.  CT C-spine without evidence of acute fracture.  X-ray left " shoulder showed comminuted displaced fracture of the proximal humerus.  Soft tissue swelling posterior to the elbow without radiographic evidence of acute fracture dislocation of the elbow.  Mild DJD at the glenohumeral joint and acromioclavicular joint.  CT left upper extremity showed comminuted displaced fracture proximal humerus.  Lobulated calcification adjacent to the greater tuberosity fracture fragment, most suspicious for calcific rotator cuff tendinopathy.  Large glenohumeral joint effusion with small intra-articular calcifications.  Her glucose was 321 and she was given 4 units regular insulin.  She stated she did not take her Lantus last night. Dr. Alejandrina martins was called and recommended sling placement, plans for surgery in the am. She was admitted to the hospitalist group.      Date:    2/10/21: Patient seen and examined in bed no acute distress, complaining of shoulder pain.  Status post open reduction internal fixation left shoulder     2/11/21-patient doing better today, anxious to go home.  Orthopedic surgery has signed off.      Day of Discharge     Vital Signs:   Temp:  [97.6 °F (36.4 °C)-99.7 °F (37.6 °C)] 97.6 °F (36.4 °C)  Heart Rate:  [72-90] 72  Resp:  [12-18] 14  BP: (128-161)/(84-93) 161/84     Physical Exam  Vitals signs and nursing note reviewed.   Constitutional:       General: She is not in acute distress.     Appearance: Normal appearance. She is well-developed. She is obese. She is not ill-appearing, toxic-appearing or diaphoretic.   HENT:      Head: Normocephalic and atraumatic.      Nose: Nose normal. No congestion or rhinorrhea.      Mouth/Throat:      Mouth: Mucous membranes are moist.      Pharynx: No oropharyngeal exudate.   Eyes:      General: No scleral icterus.        Right eye: No discharge.         Left eye: No discharge.      Extraocular Movements: Extraocular movements intact.      Conjunctiva/sclera: Conjunctivae normal.      Pupils: Pupils are equal, round, and reactive  to light.   Neck:      Musculoskeletal: Normal range of motion and neck supple. No neck rigidity or muscular tenderness.      Thyroid: No thyromegaly.      Vascular: No carotid bruit or JVD.      Trachea: No tracheal deviation.   Cardiovascular:      Rate and Rhythm: Normal rate and regular rhythm.      Pulses: Normal pulses.      Heart sounds: Normal heart sounds. No murmur. No friction rub. No gallop.    Pulmonary:      Effort: Pulmonary effort is normal. No respiratory distress.      Breath sounds: Normal breath sounds. No wheezing or rales.   Abdominal:      General: Bowel sounds are normal. There is no distension.      Palpations: Abdomen is soft.      Tenderness: There is no abdominal tenderness. There is no guarding.   Musculoskeletal: Normal range of motion.         General: Tenderness present. No swelling, deformity or signs of injury.      Right lower leg: No edema.      Left lower leg: No edema.      Comments: L shoulder immobilized    Lymphadenopathy:      Cervical: No cervical adenopathy.   Skin:     General: Skin is warm and dry.      Coloration: Skin is not jaundiced or pale.      Findings: No bruising, erythema or rash.   Neurological:      General: No focal deficit present.      Mental Status: She is alert and oriented to person, place, and time. Mental status is at baseline.      Cranial Nerves: No cranial nerve deficit.      Sensory: No sensory deficit.      Motor: No weakness or abnormal muscle tone.      Coordination: Coordination normal.   Psychiatric:         Mood and Affect: Mood normal.         Behavior: Behavior normal.         Thought Content: Thought content normal.         Judgment: Judgment normal.       Pertinent  and/or Most Recent Results     Results from last 7 days   Lab Units 02/11/21  0253 02/10/21  0602 02/09/21  1132   WBC 10*3/mm3  --  9.60 9.30   HEMOGLOBIN g/dL 13.3 14.1 14.7   HEMATOCRIT % 39.3 42.2 43.2   PLATELETS 10*3/mm3  --  183 206   SODIUM mmol/L 132* 135* 134*    POTASSIUM mmol/L 4.0 3.7 4.0   CHLORIDE mmol/L 100 99 97*   CO2 mmol/L 21.0* 25.0 25.0   BUN mg/dL 10 9 9   CREATININE mg/dL 0.56* 0.65 0.70   GLUCOSE mg/dL 270* 271* 321*   CALCIUM mg/dL 8.7 9.0 9.7     Results from last 7 days   Lab Units 02/09/21  1132   BILIRUBIN mg/dL 0.4   ALK PHOS U/L 78   ALT (SGPT) U/L 18   AST (SGOT) U/L 20   PROTIME Seconds 10.8   INR  0.98           Invalid input(s): TG, LDLCALC, LDLREALC  Results from last 7 days   Lab Units 02/09/21  1132   HEMOGLOBIN A1C % 11.4*   TROPONIN T ng/mL <0.010       Brief Urine Lab Results  (Last result in the past 365 days)      Color   Clarity   Blood   Leuk Est   Nitrite   Protein   CREAT   Urine HCG        06/10/20 0258 Yellow Clear Trace Negative Negative Negative               Microbiology Results Abnormal     Procedure Component Value - Date/Time    COVID PRE-OP / PRE-PROCEDURE SCREENING ORDER (NO ISOLATION) - Swab, Nasopharynx [[id number]]  (Normal) Collected: 02/09/21 1731    Lab Status: Final result Specimen: Swab from Nasopharynx Updated: 02/09/21 1833    Narrative:      The following orders were created for panel order COVID PRE-OP / PRE-PROCEDURE SCREENING ORDER (NO ISOLATION) - Swab, Nasopharynx.  Procedure                               Abnormality         Status                     ---------                               -----------         ------                     COVID-19,CEPHEID,COR/KENYETTA...[[id number]]  Normal              Final result                 Please view results for these tests on the individual orders.    COVID-19,CEPHEID,COR/KENYETTA/PAD IN-HOUSE(OR EMERGENT/ADD-ON),NP SWAB IN TRANSPORT MEDIA 3-4 HR TAT, RT-PCR - Swab, Nasopharynx [[id number]]  (Normal) Collected: 02/09/21 1731    Lab Status: Final result Specimen: Swab from Nasopharynx Updated: 02/09/21 1833     COVID19 Not Detected    Narrative:      Fact sheet for providers: https://www.fda.gov/media/547253/download     Fact sheet for patients:  https://www.fda.gov/media/631607/download    MRSA Screen, PCR (Inpatient) - Swab, Nares [[id number]]  (Normal) Collected: 02/09/21 1458    Lab Status: Final result Specimen: Swab from Nares Updated: 02/09/21 1817     MRSA PCR No MRSA Detected          Xr Shoulder 2+ View Left    Result Date: 2/9/2021  Impression:  1. Comminuted displaced fracture of the proximal humerus, as above. 2. Mild DJD at the glenohumeral joint and acromioclavicular joint. 3. Soft tissue swelling posterior to the elbow without radiographic evidence of acute fracture or dislocation of the elbow. 4. Small lateral epicondyle enthesophyte.  Electronically Signed By-Matthew Gomez MD On:2/9/2021 11:37 AM This report was finalized on [id number] by  Matthew Gomez MD.    Xr Humerus Left    Result Date: 2/9/2021  Impression:  1. Comminuted displaced fracture of the proximal humerus, as above. 2. Mild DJD at the glenohumeral joint and acromioclavicular joint. 3. Soft tissue swelling posterior to the elbow without radiographic evidence of acute fracture or dislocation of the elbow. 4. Small lateral epicondyle enthesophyte.  Electronically Signed By-Matthew Gomez MD On:2/9/2021 11:37 AM This report was finalized on [id number] by  Matthew Gomez MD.    Xr Elbow 3+ View Left    Result Date: 2/9/2021  Impression:  1. Comminuted displaced fracture of the proximal humerus, as above. 2. Mild DJD at the glenohumeral joint and acromioclavicular joint. 3. Soft tissue swelling posterior to the elbow without radiographic evidence of acute fracture or dislocation of the elbow. 4. Small lateral epicondyle enthesophyte.  Electronically Signed By-Matthew Gomez MD On:2/9/2021 11:37 AM This report was finalized on [id number] by  Matthew Gomez MD.    Ct Head Without Contrast    Result Date: 2/9/2021  Impression: No acute intracranial hemorrhage or mass/mass effect.  Electronically Signed By-Matthew Gomez MD On:2/9/2021 1:20 PM This report was finalized on [id number]  by  Matthew Gomez MD.    Ct Cervical Spine Without Contrast    Result Date: 2/9/2021  Impression: 1. No evidence of acute fracture or traumatic subluxation. 2. Degenerative disc disease most notably at C5-C6 and C6-C7 without evidence of significant spinal canal stenosis or neuroforaminal narrowing.  Electronically Signed By-Robles Douglas MD On:2/9/2021 12:14 PM This report was finalized on [id number] by  Robles Douglas MD.    Xr Chest 1 View    Result Date: 2/9/2021  Impression: 1. No acute cardiopulmonary abnormality.  Electronically Signed By-Robles Douglas MD On:2/9/2021 11:37 AM This report was finalized on [id number] by  Robles Douglas MD.    Ct Upper Extremity Left Without Contrast    Result Date: 2/9/2021  Impression:  1. Comminuted displaced fracture of proximal humerus, as above. 2. Lobulated calcification adjacent to the greater tuberosity fracture fragment, most suspicious for calcific rotator cuff tendinopathy. 3. Large glenohumeral joint effusion with small intra-articular calcifications. 4. Mild age-appropriate DJD at the AC joint.  Electronically Signed By-Matthew Gomez MD On:2/9/2021 12:36 PM This report was finalized on [id number] by  Matthew Gomez MD.                             Test Results Pending at Discharge        Procedures Performed  Procedure(s):  HUMERUS PROXIMAL OPEN REDUCTION INTERNAL FIXATION         Consults:   Consults     Date and Time Order Name Status Description    2/9/2021 1303 Hospitalist (on-call MD unless specified) Completed     2/9/2021 1152 Ortho (on-call MD unless specified) Completed             Discharge Details        Discharge Medications      New Medications      Instructions Start Date   HYDROcodone-acetaminophen  MG per tablet  Commonly known as: NORCO   1 tablet, Oral, Every 4 Hours PRN         Continue These Medications      Instructions Start Date   Advair Diskus 250-50 MCG/DOSE DISKUS  Generic drug: fluticasone-salmeterol   1 puff,  Inhalation, 2 Times Daily - RT      Bystolic 10 MG tablet  Generic drug: nebivolol   10 mg, Oral, Daily      Colace 100 MG capsule  Generic drug: docusate sodium   100 mg, Oral, Daily      gabapentin 100 MG capsule  Commonly known as: NEURONTIN   100 mg, Oral, Every Night at Bedtime      hydrOXYzine 25 MG tablet  Commonly known as: ATARAX   Take one tablet my mouth twice a day as needed for anxiety      Insulin Glargine 100 UNIT/ML injection pen  Commonly known as: BASAGLAR KWIKPEN   22 Units, Subcutaneous, Nightly      metFORMIN 1000 MG tablet  Commonly known as: GLUCOPHAGE   1,000 mg, Oral, 2 Times Daily      QUEtiapine 200 MG tablet  Commonly known as: SEROquel   200 mg, Oral, Nightly             Allergies   Allergen Reactions   • Naproxen Sodium Hives   • Tylenol With Codeine #3 [Acetaminophen-Codeine] Hives         Discharge Disposition:  Home or Self Care    Diet:  Hospital:  Diet Order   Procedures   • Diet Diabetic/Consistent Carbs; Diabetic - Consistent Carb         Discharge Activity:   Activity Instructions     Can take a shower, but do not get shoulder wet for 1 week. No greater than 1 pound of pressure when lifting the left arm.                  CODE STATUS:    Code Status and Medical Interventions:   Ordered at: 02/09/21 1436     Level Of Support Discussed With:    Patient     Code Status:    CPR     Medical Interventions (Level of Support Prior to Arrest):    Full         Follow-up Appointments  No future appointments.    Additional Instructions for the Follow-ups that You Need to Schedule     COVID PRE-OP / PRE-PROCEDURE SCREENING ORDER (NO ISOLATION) - Swab, Nasopharynx    Feb 09, 2021 (Approximate)      Please select your location: [facility]    COVID Screening Order: In-House: EMERGENT/PREOP-CEPHEID, 3-4 HR TAT [[id number]]    Previously tested for COVID-19?: Unknown    Employed in healthcare setting?: Unknown    Symptomatic for COVID-19 as defined by CDC?: Unknown    Hospitalized for COVID-19?: No     Admitted to ICU for COVID-19?: No    Resident in a congregate (group) care setting?: Unknown    Pregnant?: No         Ambulatory Referral to Home Health   As directed      Face to Face Visit Date: 2/11/2021    Follow-up provider for Plan of Care?: I will be treating the patient on an ongoing basis.  Please send me the Plan of Care for signature.    Follow-up provider: AYDIN FOWLER [[id number]]    Reason/Clinical Findings: shoulder surgery    Describe mobility limitations that make leaving home difficult: shoulder surgery    Nursing/Therapeutic Services Requested: Other ([facility] to eval and treat as indicated.)    Frequency: 1 Week 1         Discharge Follow-up with Specialty: Orthopedics; 3 Weeks   As directed      Specialty: Orthopedics    Follow Up: 3 Weeks                 Condition on Discharge:      Stable      This patient has been examined wearing appropriate Personal Protective Equipment and discussed with rn. 02/11/21      Electronically signed by Armen Peralta MD, 02/11/21, 11:28 AM EST.      Time: I spent  30  minutes on this discharge activity which included face-to-face encounter with the patient/reviewing the data in the system/coordination of the care with the nursing staff as well as consultants/documentation/entering orders.

## 2021-02-11 NOTE — DISCHARGE PLACEMENT REQUEST
"Serina Powell (50 y.o. Female)     Date of Birth Social Security Number Address Home Phone MRN    1970  [address] [phone] [id number]    Church Marital Status          Rastafarian Single       Admission Date Admission Type Admitting Provider Attending Provider Department, Room/Bed    2/9/21 Emergency Armen Peralta MD Salcedo, Federico N, MD [facility] SURGICAL INPATIENT, 4107/1    Discharge Date Discharge Disposition Discharge Destination         Home or Self Care              Attending Provider: Armen Peralta MD    Allergies: Naproxen Sodium, Tylenol With Codeine #3 [Acetaminophen-codeine]    Isolation: None   Infection: None   Code Status: CPR    Ht: 165.1 cm (65\")   Wt: 140 kg (309 lb 8.4 oz)    Admission Cmt: None   Principal Problem: Closed fracture of proximal end of left humerus [S42.202A]                 Active Insurance as of 2/9/2021     Primary Coverage     Payor Plan Insurance Group Employer/Plan Group    [employer] [id number]     Payor Plan Address Payor Plan Phone Number Payor Plan Fax Number Effective Dates    MAIL STOP:   4/1/2018 - None Entered    [address] [address]       [facility] [address]       Subscriber Name Subscriber Birth Date Member ID       SERINA POWELL 1970 [id number]                 Emergency Contacts      (Rel.) Home Phone Work Phone Mobile Phone    [place] (Mother) [phone] -- --              "

## 2021-02-11 NOTE — THERAPY EVALUATION
Patient Name: Serina Powell  : 1970    MRN: [id number]                              Today's Date: 2021       Admit Date: 2021    Visit Dx:     ICD-10-CM ICD-9-CM   1. Closed fracture of proximal end of left humerus, unspecified fracture morphology, initial encounter  S42.A 812.00   2. Near syncope  R55 780.2   3. Closed displaced fracture of surgical neck of left humerus, unspecified fracture morphology, initial encounter  S4A 812.01   4. Pain of left upper extremity  M79.602 729.5   5. Seizure (CMS/[facility])  R56.9 780.39     Patient Active Problem List   Diagnosis   • Fall   • Closed fracture of proximal end of left humerus   • Diabetes mellitus (CMS/[facility])   • Syncope without other cardiovascular symptoms   • COPD (chronic obstructive pulmonary disease) (CMS/[facility])   • Chronic back pain   • Essential hypertension   • Anxiety   • GERD without esophagitis     Past Medical History:   Diagnosis Date   • Diabetes mellitus (CMS/[facility])    • Injury of back    • Syncope without other cardiovascular symptoms      History reviewed. No pertinent surgical history.  General Information     Row Name 21 [facility]5          Physical Therapy Time and Intention    Document Type  evaluation  -     Mode of Treatment  physical therapy  -     Row Name 21 1235          General Information    Patient Profile Reviewed  yes patient and daughter report frequent falls. Nearly daily. pt does not use AD but does have cane at home. Reports falls are due to stress and seizures. daughter dominates communication  -     Prior Level of Function  independent:;ADL's;all household mobility  -     Existing Precautions/Restrictions  fall;non-weight bearing;left  -     Row Name 21 1235          Living Environment    Lives With  child(satnam), adult  -     Row Name 21 1235          Home Main Entrance    Number of Stairs, Main Entrance  two  -SS     Stair Railings, Main Entrance  none  -     Row Name 21  1235          Cognition    Orientation Status (Cognition)  oriented x 4  -     Row Name 02/11/21 1235          Safety Issues, Functional Mobility    Impairments Affecting Function (Mobility)  balance  -       User Key  (r) = Recorded By, (t) = Taken By, (c) = Cosigned By    Initials Name Provider Type    SS Vivi Leyva PT Physical Therapist        Mobility     Row Name 02/11/21 1238          Sit-Stand Transfer    Sit-Stand [place] (Transfers)  supervision  -     Row Name 02/11/21 1238          Gait/Stairs (Locomotion)    [place] Level (Gait)  contact guard  -     Assistive Device (Gait)  cane, straight  -     Distance in Feet (Gait)  75'  -     Deviations/Abnormal Patterns (Gait)  gait speed decreased  -     Bilateral Gait Deviations  forward flexed posture  -     Comment (Gait/Stairs)  mild postural instability noted. cues about ensuring no contact with L UE when going through doorway. educated on how to use cane. improved stability with cane when used appropriately.  -     Row Name 02/11/21 1238          Mobility    Extremity Weight-bearing Status  left upper extremity  -     Left Upper Extremity (Weight-bearing Status)  non weight-bearing (NWB)  -       User Key  (r) = Recorded By, (t) = Taken By, (c) = Cosigned By    Initials Name Provider Type    SS Vivi Leyva PT Physical Therapist        Obj/Interventions     Row Name 02/11/21 1241          Range of Motion Comprehensive    Comment, General Range of Motion  B LE WFL  -[facility] Name 02/11/21 1241          Strength Comprehensive (MMT)    Comment, General Manual Muscle Testing (MMT) Assessment  B LE >3/5 per functional assessment  -     Row Name 02/11/21 1241          Balance    Static Sitting Balance  WNL  -     Static Standing Balance  mild impairment  -[facility] Name 02/11/21 1241          Sensory Assessment (Somatosensory)    Sensory Assessment (Somatosensory)  sensation intact  -       User Key  (r)  = Recorded By, (t) = Taken By, (c) = Cosigned By    Initials Name Provider Type    SS Vivi Leyva PT Physical Therapist        Goals/Plan    No documentation.       Clinical Impression     Row Name 02/11/21 1242          Pain    Additional Documentation  Pain Scale: FACES Pre/Post-Treatment (Group)  -     Row Name 02/11/21 1242          Pain Scale: FACES Pre/Post-Treatment    Pain: FACES Scale, Pretreatment  0-->no hurt  -SS     Posttreatment Pain Rating  0-->no hurt  -SS     Row Name 02/11/21 1242          Plan of Care Review    Plan of Care Reviewed With  patient  -SS     Outcome Summary  Pt is 51 yo female who fell suffering closed proximal humerus fx. Now POD1 ORIF L shoulder with NWB orders. At baseline, pt resides with daughter and infant granddaughter. She states she is on disability for spinal issues and seizure d/o, but is independent within home. Patient reports nearly daily falls related to seizures. When attempted to discuss problem solving, patient dismissive. Daugther states falls usually occur in the evening when patient is fatigued. Patient states falls happen due to stress. Patient does not use AD for ambulation but does own cane. Transfers SBA, ambulation CGA with cane. Safety was optimized with STC. Concerned about safety at home based on report hpowever based on performance in evaluation, pt is safe to d/c home with HHPT. PPE: mask, goggles, gloves.  -     Row Name 02/11/21 1242          Therapy Assessment/Plan (PT)    Criteria for Skilled Interventions Met (PT)  no;other (see comments) d/cing home  -     Row Name 02/11/21 1242          Positioning and Restraints    Pre-Treatment Position  in bed  -SS     Post Treatment Position  chair  -SS       User Key  (r) = Recorded By, (t) = Taken By, (c) = Cosigned By    Initials Name Provider Type    Vivi Alexandra PT Physical Therapist        Outcome Measures    No documentation.       Physical Therapy Education                  Title: PT OT SLP Therapies (Done)     Topic: Physical Therapy (Done)     Point: Mobility training (Done)     Learning Progress Summary           Patient Acceptance, E, VU by  at 2/11/2021 1253                               User Key     Initials Effective Dates Name Provider Type Discipline     06/19/19 -  Vivi Leyva PT Physical Therapist PT              PT Recommendation and Plan     Plan of Care Reviewed With: patient  Outcome Summary: Pt is 49 yo female who fell suffering closed proximal humerus fx. Now POD1 ORIF L shoulder with NWB orders. At baseline, pt resides with daughter and infant granddaughter. She states she is on disability for spinal issues and seizure d/o, but is independent within home. Patient reports nearly daily falls related to seizures. When attempted to discuss problem solving, patient dismissive. Daugther states falls usually occur in the evening when patient is fatigued. Patient states falls happen due to stress. Patient does not use AD for ambulation but does own cane. Transfers SBA, ambulation CGA with cane. Safety was optimized with STC. Concerned about safety at home based on report hpowever based on performance in evaluation, pt is safe to d/c home with HHPT. PPE: mask, goggles, gloves.     Time Calculation:   PT Charges     Row Name 02/11/21 1253             Time Calculation    Start Time  1040  -      Stop Time  1107  -      Time Calculation (min)  27 min  -      PT Received On  02/11/21  -         Time Calculation- PT    Total Timed Code Minutes- PT  0 minute(s)  -        User Key  (r) = Recorded By, (t) = Taken By, (c) = Cosigned By    Initials Name Provider Type     Vivi Leyva PT Physical Therapist        Therapy Charges for Today     Code Description Service Date Service Provider Modifiers Qty    80157910775  PT EVAL MOD COMPLEXITY 4 2/11/2021 Vivi Leyva, PT GP 1          PT G-Codes  Outcome Measure Options: AM-PAC 6 Clicks Daily  Activity (OT)  AM-PAC 6 Clicks Score (OT): 14    Vivi Leyva, PT  2/11/2021

## 2021-02-11 NOTE — THERAPY EVALUATION
Patient Name: Serina Powell  : 1970    MRN: [id number]                              Today's Date: 2021       Admit Date: 2021    Visit Dx:     ICD-10-CM ICD-9-CM   1. Closed fracture of proximal end of left humerus, unspecified fracture morphology, initial encounter  S42.A 812.00   2. Near syncope  R55 780.2   3. Closed displaced fracture of surgical neck of left humerus, unspecified fracture morphology, initial encounter  S42A 812.01   4. Pain of left upper extremity  M79.602 729.5   5. Seizure (CMS/[facility])  R56.9 780.39     Patient Active Problem List   Diagnosis   • Fall   • Closed fracture of proximal end of left humerus   • Diabetes mellitus (CMS/HCC)   • Syncope without other cardiovascular symptoms   • COPD (chronic obstructive pulmonary disease) (CMS/[facility])   • Chronic back pain   • Essential hypertension   • Anxiety   • GERD without esophagitis     Past Medical History:   Diagnosis Date   • Diabetes mellitus (CMS/[facility])    • Injury of back    • Syncope without other cardiovascular symptoms      History reviewed. No pertinent surgical history.  General Information     Row Name 21 1026          OT Time and Intention    Document Type  evaluation  -ES     Mode of Treatment  occupational therapy  -ES     Row Name 21 1026          General Information    Patient Profile Reviewed  yes  -ES     Prior Level of Function  independent:;ADL's  -ES     Row Name 21 1026          Occupational Profile    Reason for Services/Referral (Occupational Profile)  Pt is 51 yo female who fell suffering closed proximal humerus fx. Now POD1 ORIF L shoulder with NWB and pendulum orders. At baseline, pt resides with daughter and infant granddaughter. She states she is on disability for spinal issues and seizure d/o, but is independent within home.  -ES     Row Name 21 1026          Living Environment    Lives With  child(satnam), adult  -ES     Row Name 21 1026          Home Main Entrance     Number of Stairs, Main Entrance  two  -ES     Stair Railings, Main Entrance  none  -ES     Row Name 02/11/21 1026          Cognition    Orientation Status (Cognition)  oriented x 4  -ES     Row Name 02/11/21 1026          Safety Issues, Functional Mobility    Safety Issues Affecting Function (Mobility)  safety precaution awareness;safety precautions follow-through/compliance  -ES       User Key  (r) = Recorded By, (t) = Taken By, (c) = Cosigned By    Initials Name Provider Type    Ria Tanner OT Occupational Therapist          Mobility/ADL's     Row Name 02/11/21 1034          Bed Mobility    Comment (Bed Mobility)  Sitting upright in bed upon OT arrival.  States she has hospital bed at home.  -     Row Name 02/11/21 1034          Transfers    Transfers  sit-stand transfer;bed-chair transfer  -     Bed-Chair [place] (Transfers)  supervision  -     Sit-Stand [place] (Transfers)  supervision  -     Row Name 02/11/21 1034          Functional Mobility    Functional Mobility- Ind. Level  supervision required  -     Row Name 02/11/21 1034          Activities of Daily Living    BADL Assessment/Intervention  upper body dressing;lower body dressing;grooming  -     Row Name 02/11/21 1034          Mobility    Extremity Weight-bearing Status  left upper extremity  -ES     Left Upper Extremity (Weight-bearing Status)  non weight-bearing (NWB)  -     Row Name 02/11/21 1034          Upper Body Dressing Assessment/Training    [place] Level (Upper Body Dressing)  maximum assist (25% patient effort) Max A  to doff/cristin sling edu on hemiplegic dressing techniques  -     Row Name 02/11/21 1034          Lower Body Dressing Assessment/Training    [place] Level (Lower Body Dressing)  moderate assist (50% patient effort)  -     Row Name 02/11/21 1034          Grooming Assessment/Training    [place] Level (Grooming)  moderate assist (50% patient effort)  -ES       User Key  (r) =  Recorded By, (t) = Taken By, (c) = Cosigned By    Initials Name Provider Type    ES Ria Nugent OT Occupational Therapist        Obj/Interventions     Row Name 02/11/21 1040          Sensory Assessment (Somatosensory)    Sensory Assessment (Somatosensory)  sensation intact  -     Row Name 02/11/21 1040          Vision Assessment/Intervention    Visual Impairment/Limitations  WNL  -ES     Row Name 02/11/21 1040          Range of Motion Comprehensive    General Range of Motion  upper extremity range of motion deficits identified  -ES     Comment, General Range of Motion  RUE WFL. LUE elbow grossly WFL. shoulder 0-15* PROM, limited by pain  -ES     Row Name 02/11/21 1040          Balance    Balance Assessment  sitting static balance;sitting dynamic balance;sit to stand dynamic balance;standing static balance  -ES     Static Sitting Balance  WNL  -ES     Dynamic Sitting Balance  WNL  -ES     Sit to Stand Dynamic Balance  mild impairment  -ES     Static Standing Balance  mild impairment  -ES     Balance Interventions  sitting;sit to stand;standing;static;supported;dynamic  -ES     Row Name 02/11/21 1040          Therapeutic Exercise    Therapeutic Exercise  -- Pendulum and PROM exercises.  -ES       User Key  (r) = Recorded By, (t) = Taken By, (c) = Cosigned By    Initials Name Provider Type    ES Ria Nugent OT Occupational Therapist        Goals/Plan     Row Name 02/11/21 1048          Bathing Goal 1 (OT)    Activity/Device (Bathing Goal 1, OT)  bathing skills, all  -ES     [place] Level/Cues Needed (Bathing Goal 1, OT)  minimum assist (75% or more patient effort)  -ES     Time Frame (Bathing Goal 1, OT)  2 weeks  -     Row Name 02/11/21 1048          Dressing Goal 1 (OT)    Activity/Device (Dressing Goal 1, OT)  upper body dressing  -ES     [place]/Cues Needed (Dressing Goal 1, OT)  moderate assist (50-74% patient effort)  -ES     Time Frame (Dressing Goal 1, OT)  2 weeks  -[facility]  Name 02/11/21 1048          ROM Goal 1 (OT)    ROM Goal 1 (OT)  HEP Pendulum exercises with cuing  -ES     Row Name 02/11/21 1048          Therapy Assessment/Plan (OT)    Planned Therapy Interventions (OT)  activity tolerance training;BADL retraining;functional balance retraining;manual therapy/joint mobilization;neuromuscular control/coordination retraining;occupation/activity based interventions;passive ROM/stretching;patient/caregiver education/training;ROM/therapeutic exercise;strengthening exercise  -ES       User Key  (r) = Recorded By, (t) = Taken By, (c) = Cosigned By    Initials Name Provider Type    Ria Tanner OT Occupational Therapist        Clinical Impression     Row Name 02/11/21 1045          Plan of Care Review    Plan of Care Reviewed With  patient  -ES     Outcome Summary  Pt is 49 yo female who fell suffering closed proximal humerus fx. Now POD1 ORIF L shoulder with NWB and pendulum orders. At baseline, pt resides with daughter and infant granddaughter. She states she is on disability for spinal issues and seizure d/o, but is independent within home. Patient up in bed upon OT arrival. Completed UB dressing with Max A and OT refit abductor sling to patient appropriately. Mod A LB ADls. Edu on pendulum exercises and hemiplegic dressing techniques. Pt completes 1 set pendulum exercises this date wit 9/10 pain. Daughter arrived at end of session and appears eager to help, but unsure if education presented is grasped. Pt appears safe to d/c home with family. Would benefit from [facility] services. PPE: Gloves, mask, eyewear  -ES     Row Name 02/11/21 1044          Therapy Assessment/Plan (OT)    Rehab Potential (OT)  good, to achieve stated therapy goals  -ES     Criteria for Skilled Therapeutic Interventions Met (OT)  yes  -ES     Therapy Frequency (OT)  5 times/wk  -ES     Row Name 02/11/21 1045          Therapy Plan Review/Discharge Plan (OT)    Anticipated Discharge Disposition (OT)  home with  home health  -ES     Row Name 02/11/21 1045          Vital Signs    Recovery Time  VSS  -ES     Row Name 02/11/21 1045          Positioning and Restraints    Pre-Treatment Position  in bed  -ES     Post Treatment Position  chair  -ES     In Chair  notified nsg;encouraged to call for assist;with family/caregiver;call light within reach  -ES       User Key  (r) = Recorded By, (t) = Taken By, (c) = Cosigned By    Initials Name Provider Type    Ria Tanner OT Occupational Therapist        Outcome Measures     Row Name 02/11/21 1049          How much help from another is currently needed...    Putting on and taking off regular lower body clothing?  2  -ES     Bathing (including washing, rinsing, and drying)  2  -ES     Toileting (which includes using toilet bed pan or urinal)  2  -ES     Putting on and taking off regular upper body clothing  2  -ES     Taking care of personal grooming (such as brushing teeth)  3  -ES     Eating meals  3  -ES     AM-PAC 6 Clicks Score (OT)  14  -ES     Row Name 02/11/21 1049          Functional Assessment    Outcome Measure Options  AM-PAC 6 Clicks Daily Activity (OT)  -ES       User Key  (r) = Recorded By, (t) = Taken By, (c) = Cosigned By    Initials Name Provider Type    Ria Tanner OT Occupational Therapist        Occupational Therapy Education                 Title: PT OT SLP Therapies (Done)     Topic: Occupational Therapy (Done)     Point: ADL training (Done)     Description:   Instruct learner(s) on proper safety adaptation and remediation techniques during self care or transfers.   Instruct in proper use of assistive devices.              Learning Progress Summary           Patient Acceptance, E,TB, VU,NR by  at 2/11/2021 1049   Family Acceptance, E,TB, VU,NR by ES at 2/11/2021 1049                   Point: Home exercise program (Done)     Description:   Instruct learner(s) on appropriate technique for monitoring, assisting and/or progressing therapeutic  exercises/activities.              Learning Progress Summary           Patient Acceptance, E,TB, VU,NR by ES at 2/11/2021 1049   Family Acceptance, E,TB, VU,NR by ES at 2/11/2021 1049                   Point: Precautions (Done)     Description:   Instruct learner(s) on prescribed precautions during self-care and functional transfers.              Learning Progress Summary           Patient Acceptance, E,TB, VU,NR by ES at 2/11/2021 1049   Family Acceptance, E,TB, VU,NR by ES at 2/11/2021 1049                   Point: Body mechanics (Done)     Description:   Instruct learner(s) on proper positioning and spine alignment during self-care, functional mobility activities and/or exercises.              Learning Progress Summary           Patient Acceptance, E,TB, VU,NR by ES at 2/11/2021 1049   Family Acceptance, E,TB, VU,NR by ES at 2/11/2021 1049                               User Key     Initials Effective Dates Name Provider Type Discipline     03/01/19 -  Ria Nugent OT Occupational Therapist OT              OT Recommendation and Plan  Planned Therapy Interventions (OT): activity tolerance training, BADL retraining, functional balance retraining, manual therapy/joint mobilization, neuromuscular control/coordination retraining, occupation/activity based interventions, passive ROM/stretching, patient/caregiver education/training, ROM/therapeutic exercise, strengthening exercise  Therapy Frequency (OT): 5 times/wk  Plan of Care Review  Plan of Care Reviewed With: patient  Outcome Summary: Pt is 51 yo female who fell suffering closed proximal humerus fx. Now POD1 ORIF L shoulder with NWB and pendulum orders. At baseline, pt resides with daughter and infant granddaughter. She states she is on disability for spinal issues and seizure d/o, but is independent within home. Patient up in bed upon OT arrival. Completed UB dressing with Max A and OT refit abductor sling to patient appropriately. Mod A LB ADls. Edu on  pendulum exercises and hemiplegic dressing techniques. Pt completes 1 set pendulum exercises this date wit 9/10 pain. Daughter arrived at end of session and appears eager to help, but unsure if education presented is grasped. Pt appears safe to d/c home with family. Would benefit from [facility] services. PPE: Gloves, mask, eyewear     Time Calculation:   Time Calculation- OT     Row Name 02/11/21 1049             Time Calculation- OT    OT Start Time  0940  -ES      OT Stop Time  1034  -ES      OT Time Calculation (min)  54 min  -ES      Total Timed Code Minutes- OT  44 minute(s)  -ES      OT Non-Billable Time (min)  10 min  -ES      OT Received On  02/11/21  -ES      OT - Next Appointment  02/12/21  -ES      OT Goal Re-Cert Due Date  02/25/21  -ES        User Key  (r) = Recorded By, (t) = Taken By, (c) = Cosigned By    Initials Name Provider Type    ES Ria Nugent OT Occupational Therapist        Therapy Charges for Today     Code Description Service Date Service Provider Modifiers Qty    27430664397 HC OT THERAPEUTIC ACT EA 15 MIN 2/11/2021 Ria Nugent OT GO 1    32904980880 HC OT SELF CARE/MGMT/TRAIN EA 15 MIN 2/11/2021 Ria Nugent OT GO 1    08337446377 HC OT THER PROC EA 15 MIN 2/11/2021 Ria Nugent OT GO 1    23765943109 HC OT EVAL MOD COMPLEXITY 3 2/11/2021 Ria Nugent OT GO 1               Ria Nugent OT  2/11/2021

## 2021-02-12 NOTE — PROGRESS NOTES
Case Management Discharge Note      Final Note: Home with [facility] (Wilma notified, patient needs PT/OT).    Provided Post Acute Provider List?: Yes  Post Acute Provider List: Home Health  Delivered To: Patient  Method of Delivery: Telephone    Selected Continued Care - Discharged on 2/11/2021 Admission date: 2/9/2021 - Discharge disposition: Home or Self Care        Home Medical Care     Service Provider Selected Services Address Phone Fax Patient Preferred    [facility] [address] [place] [id number] [phone] --                       Final Discharge Disposition Code: 06 - home with home health care

## 2021-08-07 ENCOUNTER — HOSPITAL ENCOUNTER (EMERGENCY)
Facility: HOSPITAL | Age: 51
Discharge: HOME | End: 2021-08-07

## 2021-08-07 VITALS
WEIGHT: 293 LBS | RESPIRATION RATE: 16 BRPM | TEMPERATURE: 98 F | HEART RATE: 92 BPM | DIASTOLIC BLOOD PRESSURE: 85 MMHG | SYSTOLIC BLOOD PRESSURE: 145 MMHG | HEIGHT: 64 IN | BODY MASS INDEX: 50.02 KG/M2 | OXYGEN SATURATION: 100 %

## 2021-08-07 PROCEDURE — 99211 OFF/OP EST MAY X REQ PHY/QHP: CPT

## 2022-04-29 ENCOUNTER — TRANSCRIBE ORDERS (OUTPATIENT)
Dept: ADMINISTRATIVE | Facility: HOSPITAL | Age: 52
End: 2022-04-29

## 2022-04-29 ENCOUNTER — HOSPITAL ENCOUNTER (OUTPATIENT)
Dept: GENERAL RADIOLOGY | Facility: HOSPITAL | Age: 52
Discharge: HOME | End: 2022-04-29
Admitting: NURSE PRACTITIONER

## 2022-04-29 DIAGNOSIS — M54.50 LOW BACK PAIN, UNSPECIFIED BACK PAIN LATERALITY, UNSPECIFIED CHRONICITY, UNSPECIFIED WHETHER SCIATICA PRESENT: Primary | ICD-10-CM

## 2022-04-29 DIAGNOSIS — M54.50 LOW BACK PAIN, UNSPECIFIED BACK PAIN LATERALITY, UNSPECIFIED CHRONICITY, UNSPECIFIED WHETHER SCIATICA PRESENT: ICD-10-CM

## 2022-04-29 PROCEDURE — 72110 X-RAY EXAM L-2 SPINE 4/>VWS: CPT

## 2023-06-21 PROBLEM — L03.119 CELLULITIS IN DIABETIC FOOT: Status: ACTIVE | Noted: 2023-06-21

## 2023-06-21 PROBLEM — E11.628 CELLULITIS IN DIABETIC FOOT: Status: ACTIVE | Noted: 2023-06-21

## 2023-06-24 ENCOUNTER — HOME HEALTH ADMISSION (OUTPATIENT)
Dept: HOME HEALTH SERVICES | Facility: HOME HEALTHCARE | Age: 53
End: 2023-06-24
Payer: MEDICAID

## 2023-07-07 ENCOUNTER — TELEPHONE (OUTPATIENT)
Dept: PODIATRY | Facility: CLINIC | Age: 53
End: 2023-07-07

## 2023-07-07 NOTE — TELEPHONE ENCOUNTER
Caller: Serina Powell    Relationship to patient: Self    Best call back number: [phone]    Additional notes: SANDRA FROM HOME CARE CALLING TO SCHEDULE PATIENT FIRST POST OP AFTER PROCEDURE WITH DR. JENSEN. HUB UNABLE TO WARM TRANSFER.

## 2023-07-24 ENCOUNTER — HOME CARE VISIT (OUTPATIENT)
Dept: HOME HEALTH SERVICES | Facility: HOME HEALTHCARE | Age: 53
End: 2023-07-24
Payer: MEDICAID

## 2023-07-24 ENCOUNTER — LAB REQUISITION (OUTPATIENT)
Dept: LAB | Facility: HOSPITAL | Age: 53
End: 2023-07-24
Payer: MEDICAID

## 2023-07-24 ENCOUNTER — TELEPHONE (OUTPATIENT)
Dept: PODIATRY | Facility: CLINIC | Age: 53
End: 2023-07-24
Payer: MEDICAID

## 2023-07-24 VITALS
SYSTOLIC BLOOD PRESSURE: 138 MMHG | OXYGEN SATURATION: 97 % | HEART RATE: 88 BPM | RESPIRATION RATE: 18 BRPM | TEMPERATURE: 97 F | DIASTOLIC BLOOD PRESSURE: 88 MMHG

## 2023-07-24 DIAGNOSIS — E11.621 TYPE 2 DIABETES MELLITUS WITH FOOT ULCER (CODE): ICD-10-CM

## 2023-07-24 LAB
BASOPHILS # BLD AUTO: 0.1 10*3/MM3 (ref 0–0.2)
BASOPHILS NFR BLD AUTO: 0.7 % (ref 0–1.5)
CK SERPL-CCNC: 45 U/L (ref 20–180)
CREAT SERPL-MCNC: 0.81 MG/DL (ref 0.57–1)
DEPRECATED RDW RBC AUTO: 40.7 FL (ref 37–54)
EGFRCR SERPLBLD CKD-EPI 2021: 85.9 ML/MIN/1.73
EOSINOPHIL # BLD AUTO: 0.3 10*3/MM3 (ref 0–0.4)
EOSINOPHIL NFR BLD AUTO: 2.6 % (ref 0.3–6.2)
ERYTHROCYTE [DISTWIDTH] IN BLOOD BY AUTOMATED COUNT: 13 % (ref 12.3–15.4)
ERYTHROCYTE [SEDIMENTATION RATE] IN BLOOD: 63 MM/HR (ref 0–30)
HCT VFR BLD AUTO: 41.2 % (ref 34–46.6)
HGB BLD-MCNC: 13.6 G/DL (ref 12–15.9)
LYMPHOCYTES # BLD AUTO: 2.8 10*3/MM3 (ref 0.7–3.1)
LYMPHOCYTES NFR BLD AUTO: 28.4 % (ref 19.6–45.3)
MCH RBC QN AUTO: 28.1 PG (ref 26.6–33)
MCHC RBC AUTO-ENTMCNC: 33 G/DL (ref 31.5–35.7)
MCV RBC AUTO: 85.1 FL (ref 79–97)
MONOCYTES # BLD AUTO: 0.5 10*3/MM3 (ref 0.1–0.9)
MONOCYTES NFR BLD AUTO: 5.1 % (ref 5–12)
NEUTROPHILS NFR BLD AUTO: 6.1 10*3/MM3 (ref 1.7–7)
NEUTROPHILS NFR BLD AUTO: 63.2 % (ref 42.7–76)
NRBC BLD AUTO-RTO: 0.2 /100 WBC (ref 0–0.2)
PLATELET # BLD AUTO: 191 10*3/MM3 (ref 140–450)
PMV BLD AUTO: 10.1 FL (ref 6–12)
RBC # BLD AUTO: 4.85 10*6/MM3 (ref 3.77–5.28)
WBC NRBC COR # BLD: 9.7 10*3/MM3 (ref 3.4–10.8)

## 2023-07-24 PROCEDURE — 85652 RBC SED RATE AUTOMATED: CPT | Performed by: INTERNAL MEDICINE

## 2023-07-24 PROCEDURE — 82550 ASSAY OF CK (CPK): CPT | Performed by: INTERNAL MEDICINE

## 2023-07-24 PROCEDURE — 85025 COMPLETE CBC W/AUTO DIFF WBC: CPT | Performed by: INTERNAL MEDICINE

## 2023-07-24 PROCEDURE — 82565 ASSAY OF CREATININE: CPT | Performed by: INTERNAL MEDICINE

## 2023-07-24 PROCEDURE — G0299 HHS/HOSPICE OF RN EA 15 MIN: HCPCS

## 2023-07-24 NOTE — HOME HEALTH
Routine Visit Note:  wound care performed today, with wound vac to LLE, PICC line dressing changed, no blood return, labs due today also, drawn from left hand and taken to [facility].  Pt. states her appt. with surgeon is not until 9/23.,  called Dr. Donahue today and MA is to discuss with MD a closer appt., pt. states her stitches on bottom of foot is causing pain    Skill/education provided: CP assess, pain assess, falls assess, wound care and assess, labs drawn    Patient/caregiver response: tolerated well    Plan for next visit: CP assess, pain assess, falls assess, follow up Dr. Donahue call, wound vac change    Other pertinent info: pt's last IV antibiotic due 8/5/23 last day

## 2023-07-24 NOTE — TELEPHONE ENCOUNTER
Rosario Jim RN the patients home health nurse called and wanted to know if the patient has been seen since her surgery on 6/21/23? I told her she no showed one appt and is now scheduled for an office visit with Dr. Donahue on 9/21/23. Rosario is concerned about the sutures that are still in her foot since her surgery and believes they will need to come out before her 9/21/23 appt.

## 2023-07-26 ENCOUNTER — HOME CARE VISIT (OUTPATIENT)
Dept: HOME HEALTH SERVICES | Facility: HOME HEALTHCARE | Age: 53
End: 2023-07-26
Payer: MEDICAID

## 2023-07-26 ENCOUNTER — OFFICE VISIT (OUTPATIENT)
Dept: PODIATRY | Facility: CLINIC | Age: 53
End: 2023-07-26
Payer: MEDICAID

## 2023-07-26 VITALS
RESPIRATION RATE: 19 BRPM | HEART RATE: 88 BPM | OXYGEN SATURATION: 95 % | DIASTOLIC BLOOD PRESSURE: 86 MMHG | TEMPERATURE: 96 F | SYSTOLIC BLOOD PRESSURE: 144 MMHG

## 2023-07-26 VITALS — HEIGHT: 65 IN | BODY MASS INDEX: 48.82 KG/M2 | RESPIRATION RATE: 20 BRPM | WEIGHT: 293 LBS

## 2023-07-26 DIAGNOSIS — E11.42 DM TYPE 2 WITH DIABETIC PERIPHERAL NEUROPATHY: ICD-10-CM

## 2023-07-26 DIAGNOSIS — E66.01 MORBID OBESITY WITH BMI OF 50.0-59.9, ADULT: ICD-10-CM

## 2023-07-26 DIAGNOSIS — L97.522 CHRONIC ULCER OF LEFT FOOT WITH FAT LAYER EXPOSED: Primary | ICD-10-CM

## 2023-07-26 PROCEDURE — G0299 HHS/HOSPICE OF RN EA 15 MIN: HCPCS

## 2023-07-26 RX ORDER — CYCLOBENZAPRINE HCL 5 MG
1 TABLET ORAL 3 TIMES DAILY
COMMUNITY
Start: 2023-07-13

## 2023-07-26 NOTE — HOME HEALTH
Routine Visit Note:  pt. went to Dr. Donahue for first follow up today, pt. states not as much pressure on bottom of foot since stitches have been removed by Dr. Donahue, wound vac was placed on pt.'s Left foot.    Skill/education provided: CP assess, pain assess, falls assess, wound care and assess    Patient/caregiver response: tolerated well    Plan for next visit: CP assess, pain assess, wound vac change, wound care    Other pertinent info: n/a

## 2023-07-26 NOTE — PROGRESS NOTES
07/26/2023  Foot and Ankle Surgery - Established Patient/Follow-up  Provider: Dr. Elpidio Donahue, RICHA  Location: [facility]    Subjective:  Serina Powell is a 53 y.o. female.     Chief Complaint   Patient presents with    Left Foot - Post-op     6/21/2023   Incision and drainage to the level of bone, left foot  2.  Partial second ray resection, left foot  3.  Partial third ray resection, left foot  4.  Wound VAC application, left foot      Post-op     NO PROVIDER IN SYSTEM       HPI: The patient was last seen in the hospital on 06/21/2023. She has been receiving home health care. She has an appointment on 09/21/2023 with home health. The adult female states that the patient has been experiencing shooting pain that radiates down her right lower extremity. She is allergic to TRAMADOL.    Allergies   Allergen Reactions    Acetaminophen Unknown - High Severity    Codeine Unknown - High Severity    Pregabalin Unknown - High Severity    Topiramate Unknown - High Severity    Valdecoxib Unknown - High Severity    Tramadol Swelling    Naproxen Sodium Hives    Tylenol With Codeine #3 [Acetaminophen-Codeine] Hives       Current Outpatient Medications on File Prior to Visit   Medication Sig Dispense Refill    albuterol (PROVENTIL) (2.5 MG/3ML) 0.083% nebulizer solution Take 2.5 mg by nebulization Every 4 (Four) Hours As Needed for Wheezing. Indications: Spasm of Lung Air Passages      cyclobenzaprine (FLEXERIL) 5 MG tablet Take 1 tablet by mouth 3 (Three) Times a Day.      docusate sodium (COLACE) 100 MG capsule Take 1 capsule by mouth 2 (Two) Times a Day As Needed. Indications: Constipation      fluticasone-salmeterol (ADVAIR HFA) 45-21 MCG/ACT inhaler Inhale 2 puffs 2 (Two) Times a Day. Indications: Asthma      hydroCHLOROthiazide (HYDRODIURIL) 25 MG tablet Take 1 tablet by mouth Daily. Indications: Edema      hydrOXYzine (ATARAX) 25 MG tablet Take one tablet my mouth twice a day as needed for anxiety  "60 tablet 2    ibuprofen (ADVIL,MOTRIN) 800 MG tablet Take 1 tablet by mouth Every 6 (Six) Hours As Needed for Mild Pain. Indications: Pain      Insulin Lispro (ADMELOG SOLOSTAR) 100 UNIT/ML injection pen Inject 11 Units under the skin into the appropriate area as directed 3 (Three) Times a Day With Meals. 15 mL 0    metFORMIN (GLUCOPHAGE) 500 MG tablet Take 2 tablets by mouth 2 (Two) Times a Day With Meals. Indications: Type 2 Diabetes      nebivolol (BYSTOLIC) 10 MG tablet Take 1 tablet by mouth Daily. Indications: High Blood Pressure Disorder      QUEtiapine (SEROquel) 25 MG tablet Take 1 tablet by mouth Every Night. Indications: Major Depressive Disorder      vancomycin 1250 mg in sodium chloride 0.9% 250 mL (CD) Infuse 1,250 mg into a venous catheter Every 8 (Eight) Hours for 119 doses. Indications: Infection of the Skin and/or Soft Tissue (Patient taking differently: Infuse 1,250 mg into a venous catheter Every 12 (Twelve) Hours. Indications: Infection of the Skin and/or Soft Tissue)       No current facility-administered medications on file prior to visit.       Objective   Resp 20   Ht 165.1 cm (65\")   Wt (!) 141 kg (310 lb)   BMI 51.59 kg/m²     Foot/Ankle Exam  GENERAL  Appearance:  appears stated age and obese  Orientation:  AAOx3  Affect:  appropriate     VASCULAR      Left Foot Vascularity   Dorsalis pedis:  2+  Posterior tibial:  2+  Skin temperature:  warm  Edema gradin+  CFT:  < 3 seconds  Pedal hair growth:  Absent     NEUROLOGIC      Left Foot Neurologic   Light touch sensation: diminished  Hot/Cold sensation:  diminished  Achilles reflex:  1+     MUSCULOSKELETAL      Left Foot Musculoskeletal   Tenderness:  dorsal foot tenderness and midtarsal joints tenderness  Arch:  Normal     MUSCLE STRENGTH      Left Foot Muscle Strength   Normal strength    Foot dorsiflexion:  5  Foot plantar flexion:  5  Foot inversion:  5  Foot eversion:  5     DERMATOLOGIC          Left Foot Dermatologic "   Skin  Left foot skin is not intact. Positive for cellulitis, drainage, erythema, maceration, skin changes and ulcer.     Left foot additional comments: Patient has plantar wound with significant periwound maceration and skin necrosis with deep tunneling to the level of bone.  Significant erythema involving the dorsal aspect of the forefoot.  Noted malodor and discoloration to the forefoot.    07/26/2023  Wound has improved with granular wound base and filled in. Wound now measures approximately 2.5 x 2.0 x 1.5 cm. Mild fibrotic wound base. No exposure of deep tissues. Mild plantar maceration.    Assessment & Plan   Diagnoses and all orders for this visit:    1. Chronic ulcer of left foot with fat layer exposed (Primary)    2. DM type 2 with diabetic peripheral neuropathy    3. Morbid obesity with BMI of 50.0-59.9, adult      Patient is a 53-year-old female who presents approximately month after recent hospitalization and left partial second and third ray resections.  She was discharged with IV antibiotics and home health for wound VAC management.  She has been receiving wound VAC dressing changes.  Wound has improved with decrease in size and depth.  She does not have any obvious exposure of important underlying structures.  Debridement was performed today.  Saline wet-to-dry dressing was placed and I have asked that she contact her home health agency for wound VAC application which can be reapplied on Friday if necessary.  Patient should continue receiving the wound VAC dressing changes on Monday, Wednesday, and Fridays.  She needs to decrease her overall activity.  I have recommended that she follow-up with JOYCE Starkey in 2 weeks for continued wound management and imaging of the left foot.    Excisional Debridement to subcutaneous tissue: Left foot    Pre ulcer measurement: 2.0 x 2.3 x 1.0cm    Post ulcer measurement: 2.4 x 2.6 x 1.5cm    Anesthesia: None, as patient is neuropathic    Bleeding: <5cc    Pre-op pain:  0    Post-op pain: 0    Complications: None    Consent and time out was performed before proceeding with the procedure.  Excisional debridement to the level of was performed with a 15 blade and curette without complication.  Viable and nonviable skin, subcutaneous tissue was excised to a healthy bleeding base.  No purulence or proximal extension was identified. Hemostasis was achieved with pressure.  Dry sterile dressings were applied.  Patient tolerated the procedure well.      No orders of the defined types were placed in this encounter.         Note is dictated utilizing voice recognition software. Unfortunately this leads to occasional typographical errors. I apologize in advance if the situation occurs. If questions occur please do not hesitate to call our office.    Transcribed from ambient dictation for KASIE Donahue DPM by Matthew Johns.  07/26/23   12:59 EDT    Patient or patient representative verbalized consent to the visit recording.  I have personally performed the services described in this document as transcribed by the above individual, and it is both accurate and complete.

## 2023-07-28 ENCOUNTER — HOME CARE VISIT (OUTPATIENT)
Dept: HOME HEALTH SERVICES | Facility: HOME HEALTHCARE | Age: 53
End: 2023-07-28
Payer: MEDICAID

## 2023-07-28 VITALS
HEART RATE: 82 BPM | TEMPERATURE: 98 F | SYSTOLIC BLOOD PRESSURE: 130 MMHG | OXYGEN SATURATION: 95 % | RESPIRATION RATE: 18 BRPM | DIASTOLIC BLOOD PRESSURE: 82 MMHG

## 2023-07-28 PROCEDURE — G0299 HHS/HOSPICE OF RN EA 15 MIN: HCPCS

## 2023-07-28 NOTE — HOME HEALTH
Routine Visit Note: Medications reviewed. Appetite well. No issues with elimination. Walker at chairside.     Skill/education provided: Vital signs stable. PICC dressing assessed; clean dry and intact. Wound care completed. Education provided about IV abx and wound healing. Lung assessment completed. Patient denies falls.     Patient/caregiver response: Patient verbilized understanding.     Plan for next visit: Vital signs, wound care, blood work, review medications, assess falls and pain, safety education    Other pertinent info: na

## 2023-07-31 ENCOUNTER — HOME CARE VISIT (OUTPATIENT)
Dept: HOME HEALTH SERVICES | Facility: HOME HEALTHCARE | Age: 53
End: 2023-07-31
Payer: MEDICAID

## 2023-07-31 ENCOUNTER — LAB REQUISITION (OUTPATIENT)
Dept: LAB | Facility: HOSPITAL | Age: 53
End: 2023-07-31
Payer: MEDICAID

## 2023-07-31 VITALS
OXYGEN SATURATION: 96 % | SYSTOLIC BLOOD PRESSURE: 138 MMHG | DIASTOLIC BLOOD PRESSURE: 70 MMHG | HEART RATE: 69 BPM | TEMPERATURE: 97.8 F | RESPIRATION RATE: 16 BRPM

## 2023-07-31 DIAGNOSIS — E11.40 TYPE 2 DIABETES MELLITUS WITH DIABETIC NEUROPATHY, UNSPECIFIED: ICD-10-CM

## 2023-07-31 LAB
BASOPHILS # BLD AUTO: 0.1 10*3/MM3 (ref 0–0.2)
BASOPHILS NFR BLD AUTO: 0.8 % (ref 0–1.5)
CK SERPL-CCNC: 112 U/L (ref 20–180)
CREAT SERPL-MCNC: 0.75 MG/DL (ref 0.57–1)
DEPRECATED RDW RBC AUTO: 41.6 FL (ref 37–54)
EGFRCR SERPLBLD CKD-EPI 2021: 94.2 ML/MIN/1.73
EOSINOPHIL # BLD AUTO: 0.4 10*3/MM3 (ref 0–0.4)
EOSINOPHIL NFR BLD AUTO: 4 % (ref 0.3–6.2)
ERYTHROCYTE [DISTWIDTH] IN BLOOD BY AUTOMATED COUNT: 13.4 % (ref 12.3–15.4)
ERYTHROCYTE [SEDIMENTATION RATE] IN BLOOD: 67 MM/HR (ref 0–30)
HCT VFR BLD AUTO: 40.3 % (ref 34–46.6)
HGB BLD-MCNC: 13.1 G/DL (ref 12–15.9)
LYMPHOCYTES # BLD AUTO: 3 10*3/MM3 (ref 0.7–3.1)
LYMPHOCYTES NFR BLD AUTO: 31.2 % (ref 19.6–45.3)
MCH RBC QN AUTO: 28.7 PG (ref 26.6–33)
MCHC RBC AUTO-ENTMCNC: 32.4 G/DL (ref 31.5–35.7)
MCV RBC AUTO: 88.4 FL (ref 79–97)
MONOCYTES # BLD AUTO: 0.5 10*3/MM3 (ref 0.1–0.9)
MONOCYTES NFR BLD AUTO: 5.4 % (ref 5–12)
NEUTROPHILS NFR BLD AUTO: 5.6 10*3/MM3 (ref 1.7–7)
NEUTROPHILS NFR BLD AUTO: 58.6 % (ref 42.7–76)
NRBC BLD AUTO-RTO: 0.1 /100 WBC (ref 0–0.2)
PLATELET # BLD AUTO: 191 10*3/MM3 (ref 140–450)
PMV BLD AUTO: 10.1 FL (ref 6–12)
RBC # BLD AUTO: 4.55 10*6/MM3 (ref 3.77–5.28)
VANCOMYCIN TROUGH SERPL-MCNC: <4 MCG/ML (ref 5–20)
WBC NRBC COR # BLD: 9.5 10*3/MM3 (ref 3.4–10.8)

## 2023-07-31 PROCEDURE — 85025 COMPLETE CBC W/AUTO DIFF WBC: CPT | Performed by: INTERNAL MEDICINE

## 2023-07-31 PROCEDURE — 85652 RBC SED RATE AUTOMATED: CPT | Performed by: INTERNAL MEDICINE

## 2023-07-31 PROCEDURE — G0299 HHS/HOSPICE OF RN EA 15 MIN: HCPCS

## 2023-07-31 PROCEDURE — 82565 ASSAY OF CREATININE: CPT | Performed by: INTERNAL MEDICINE

## 2023-07-31 PROCEDURE — 80202 ASSAY OF VANCOMYCIN: CPT | Performed by: INTERNAL MEDICINE

## 2023-07-31 PROCEDURE — 82550 ASSAY OF CK (CPK): CPT | Performed by: INTERNAL MEDICINE

## 2023-08-02 ENCOUNTER — HOME CARE VISIT (OUTPATIENT)
Dept: HOME HEALTH SERVICES | Facility: HOME HEALTHCARE | Age: 53
End: 2023-08-02
Payer: MEDICAID

## 2023-08-02 PROCEDURE — G0299 HHS/HOSPICE OF RN EA 15 MIN: HCPCS

## 2023-08-03 VITALS
TEMPERATURE: 98 F | RESPIRATION RATE: 18 BRPM | DIASTOLIC BLOOD PRESSURE: 76 MMHG | SYSTOLIC BLOOD PRESSURE: 148 MMHG | HEART RATE: 88 BPM | OXYGEN SATURATION: 98 %

## 2023-08-04 ENCOUNTER — HOME CARE VISIT (OUTPATIENT)
Dept: HOME HEALTH SERVICES | Facility: HOME HEALTHCARE | Age: 53
End: 2023-08-04
Payer: MEDICAID

## 2023-08-04 VITALS
OXYGEN SATURATION: 98 % | DIASTOLIC BLOOD PRESSURE: 86 MMHG | TEMPERATURE: 98.7 F | HEART RATE: 70 BPM | SYSTOLIC BLOOD PRESSURE: 144 MMHG | RESPIRATION RATE: 17 BRPM

## 2023-08-04 PROCEDURE — G0299 HHS/HOSPICE OF RN EA 15 MIN: HCPCS

## 2023-08-07 ENCOUNTER — HOME CARE VISIT (OUTPATIENT)
Dept: HOME HEALTH SERVICES | Facility: HOME HEALTHCARE | Age: 53
End: 2023-08-07
Payer: MEDICAID

## 2023-08-07 VITALS
OXYGEN SATURATION: 96 % | DIASTOLIC BLOOD PRESSURE: 78 MMHG | HEART RATE: 78 BPM | TEMPERATURE: 97.9 F | SYSTOLIC BLOOD PRESSURE: 132 MMHG | RESPIRATION RATE: 18 BRPM

## 2023-08-07 PROCEDURE — G0299 HHS/HOSPICE OF RN EA 15 MIN: HCPCS

## 2023-08-07 NOTE — HOME HEALTH
Routine Visit Note: Medications reviewed. PICC line removed on Friday. Appetite well. No issues with elimination.     Skill/education provided: Wound care completed; healing noted, no s/s of infection. Patient denies falls. Education provided about wound healing and monitoring blood sugar. Patient is unable to monitor at this time due to suppilies on the way.     Patient/caregiver response: Patient verbilized understanding.     Plan for next visit: Vital signs, cp assess, assess falls and pain, wound care, safety education, assess blood sugar    Other pertinent info: na

## 2023-08-08 ENCOUNTER — TELEPHONE (OUTPATIENT)
Dept: PODIATRY | Facility: CLINIC | Age: 53
End: 2023-08-08
Payer: MEDICAID

## 2023-08-08 NOTE — TELEPHONE ENCOUNTER
Serina called and said that she needs you to call her [facility] insurance for coverage on her wound vac. She said you called have called them before regarding this. Please advise.   Thank you

## 2023-08-09 ENCOUNTER — HOME CARE VISIT (OUTPATIENT)
Dept: HOME HEALTH SERVICES | Facility: HOME HEALTHCARE | Age: 53
End: 2023-08-09
Payer: MEDICAID

## 2023-08-09 VITALS
HEART RATE: 76 BPM | DIASTOLIC BLOOD PRESSURE: 68 MMHG | OXYGEN SATURATION: 98 % | SYSTOLIC BLOOD PRESSURE: 122 MMHG | TEMPERATURE: 97.3 F

## 2023-08-09 PROCEDURE — G0299 HHS/HOSPICE OF RN EA 15 MIN: HCPCS

## 2023-08-09 NOTE — HOME HEALTH
Routine Visit Note: Patient reports he insurance will not provide any more wound vac dressings and were trying to  the wound vac, dressing brought from office for todays dressing change. Left 2 more dressings in the home for the next nurse    Skill/education provided: cardiopulmonary assessment, wound assessment and care, wound vac dressing change, assess pain / falls, medication review, Tried to encourage patients daughter to allow patient more control to manage her own healthcare. Both patient and daughter agree this would be a good thing    Patient/caregiver response: Patient reports gratitude for wound care and is excited it is healing so well    Plan for next visit:  cardiopulmonary assessment, wound assessment and care, wound vac dressing change, assess pain / falls, medication review

## 2023-08-09 NOTE — TELEPHONE ENCOUNTER
I have reached out for a third time, it went to voicemail. I have asked her to have her insurance company contact  since they usually contact him first.

## 2023-08-11 ENCOUNTER — HOME CARE VISIT (OUTPATIENT)
Dept: HOME HEALTH SERVICES | Facility: HOME HEALTHCARE | Age: 53
End: 2023-08-11
Payer: MEDICAID

## 2023-08-11 VITALS
SYSTOLIC BLOOD PRESSURE: 144 MMHG | RESPIRATION RATE: 19 BRPM | DIASTOLIC BLOOD PRESSURE: 84 MMHG | OXYGEN SATURATION: 99 % | HEART RATE: 78 BPM | TEMPERATURE: 97 F

## 2023-08-11 PROCEDURE — G0299 HHS/HOSPICE OF RN EA 15 MIN: HCPCS

## 2023-08-11 NOTE — HOME HEALTH
Routine Visit Note:  no changes in status since last SN visit, upon evaluation of wound vac, it was not turned on, SN educate pt. to monitor to see if wound vac therapy is on and working    Skill/education provided: CP assess, pain assess, falls assess, wound care and assess    Patient/caregiver response: tolerated well    Plan for next visit: CP assess, pain assess, falls assess, wound vac change    Other pertinent info: n/a

## 2023-08-14 ENCOUNTER — HOME CARE VISIT (OUTPATIENT)
Dept: HOME HEALTH SERVICES | Facility: HOME HEALTHCARE | Age: 53
End: 2023-08-14
Payer: MEDICAID

## 2023-08-14 VITALS
TEMPERATURE: 98 F | SYSTOLIC BLOOD PRESSURE: 138 MMHG | DIASTOLIC BLOOD PRESSURE: 88 MMHG | OXYGEN SATURATION: 94 % | RESPIRATION RATE: 18 BRPM | HEART RATE: 80 BPM

## 2023-08-14 PROCEDURE — G0299 HHS/HOSPICE OF RN EA 15 MIN: HCPCS

## 2023-08-14 NOTE — HOME HEALTH
Routine Visit Note: Medications reviewed. Appetite well. No issues with elimination. Patient denies falls.     Skill/education provided: Vital signs stable. Wound care completed. Patient has complaints of pain in left foot. CP assessed. Lung sounds clear. Education provided about wound healing.     Patient/caregiver response: Patient verbilized understanding.     Plan for next visit: Vital signs, wound care, cp assess, assess falls and pain, lung assessment, review medications     Other pertinent info: na

## 2023-08-16 ENCOUNTER — HOME CARE VISIT (OUTPATIENT)
Dept: HOME HEALTH SERVICES | Facility: HOME HEALTHCARE | Age: 53
End: 2023-08-16
Payer: MEDICAID

## 2023-08-16 VITALS
OXYGEN SATURATION: 98 % | SYSTOLIC BLOOD PRESSURE: 146 MMHG | TEMPERATURE: 97 F | DIASTOLIC BLOOD PRESSURE: 84 MMHG | HEART RATE: 88 BPM | RESPIRATION RATE: 18 BRPM

## 2023-08-16 PROCEDURE — G0299 HHS/HOSPICE OF RN EA 15 MIN: HCPCS

## 2023-08-16 NOTE — HOME HEALTH
Routine Visit Note:  no changes in pt.status, wound vac placed today, pt. tolerated well    Skill/education provided: wound care and assess, DM education    Patient/caregiver response: tolerated well    Plan for next visit: CP assess,pain assess, falls assess, wound care and assess    Other pertinent info: n/a

## 2023-08-18 ENCOUNTER — TELEPHONE (OUTPATIENT)
Dept: PODIATRY | Facility: CLINIC | Age: 53
End: 2023-08-18
Payer: MEDICAID

## 2023-08-18 ENCOUNTER — HOME CARE VISIT (OUTPATIENT)
Dept: HOME HEALTH SERVICES | Facility: HOME HEALTHCARE | Age: 53
End: 2023-08-18
Payer: MEDICAID

## 2023-08-18 VITALS
DIASTOLIC BLOOD PRESSURE: 86 MMHG | SYSTOLIC BLOOD PRESSURE: 142 MMHG | OXYGEN SATURATION: 97 % | HEART RATE: 78 BPM | RESPIRATION RATE: 19 BRPM | TEMPERATURE: 97 F

## 2023-08-18 PROCEDURE — G0299 HHS/HOSPICE OF RN EA 15 MIN: HCPCS

## 2023-08-18 RX ORDER — CLINDAMYCIN HYDROCHLORIDE 300 MG/1
300 CAPSULE ORAL 4 TIMES DAILY
Qty: 40 CAPSULE | Refills: 0 | Status: SHIPPED | OUTPATIENT
Start: 2023-08-18 | End: 2023-08-28

## 2023-08-18 NOTE — TELEPHONE ENCOUNTER
HUB AGENT ATTEMPTED CLINICAL WARM TRANSFER - NO ANSWER     PATIENT HAD LEFT 1st-3rd TOE AMPUTATION SURGERY BY DR JENSEN 06-21-23     PLEASE CALL / LEAVE VMAIL TO DISCUSS POST OP CONCERNS - LOTS MORE YELLOWISH DRAINAGE SINCE HOME HEALTH APPT THIS PAST WED 08-16-23 AND IS VERY ODOROUS PER HOME HEALTH NURSE SANDRA (ALSO UPLOADED A PHOTO INTO PATIENT's CHART)     AWARE PATIENT WILL NEED TO BE SEEN BY DR JENSEN (CURRENTLY SCHEDULED 09/21 AFTER MISSED 07-10-23 POST OP / NEW PATIENT APPT), BUT HOME HEALTH NURSE SANDRA ASKING ABOUT AN ANTIBIOTIC TO BE PRESCRIBED BEFORE THE WEEKEND?

## 2023-08-18 NOTE — HOME HEALTH
Routine Visit Note: wound vac change due today to left foot, lots of drainage in canister compared to Wednesday.  SN changed wound vac, lots of odor from wound, SN called Dr. Donahue, still awaiting a call back    Skill/education provided: wound care, CP assess, pain assess, falls assess, DM education    Patient/caregiver response: tolerated well    Plan for next visit: wound vac change, follow up Dr. Donahue phone call, pain assess,CP asssess    Other pertinent info: n/a

## 2023-08-18 NOTE — TELEPHONE ENCOUNTER
Per  call in clindamycin. This has been done and patient called and transferred to schedule follow up appt.

## 2023-08-21 ENCOUNTER — HOME CARE VISIT (OUTPATIENT)
Dept: HOME HEALTH SERVICES | Facility: HOME HEALTHCARE | Age: 53
End: 2023-08-21
Payer: MEDICAID

## 2023-08-21 PROCEDURE — G0299 HHS/HOSPICE OF RN EA 15 MIN: HCPCS

## 2023-08-21 NOTE — HOME HEALTH
Routine Visit Note:  wound care due today to LLE, pt. was rx. antibiotic but has not received yet, is supposed to get tomorrow. I did not put wound vac back on d/t severe odor and drainage and moist surrounding skin.  Pt. still does not monitor her BS, educated to try and get her lancets when she gets her antibiotic.  SN sent cc note to MD regarding findings.     Skill/education provided: CP assess, pain assess, falls assess, wound care and assessment    Patient/caregiver response: tolerated well    Plan for next visit: follow up if pt. received antibiotic, CP assess, pain assess, wound care and assess    Other pertinent info: n/a

## 2023-08-21 NOTE — Clinical Note
I saw pt. today, you called antibiotic in on Friday, pt. has not picked up yet, when I went to change the wound vac, the wound was very odorous (worse than Friday), with dark drainage.  Pt. c/o pain on left foot today, I did not put wound vac back on, I did a wet to dry NS dressing and educated her daughter to do twice daily.  I told pt. I would look at wound again on Wednesday, and I would send you a message regarding all of this in case you wanted to see her.  There is today's picture in epic if you wanted to see.  Please let me know if you think any differently or what I need to do.  Thank you,   Rosario Jim RN   [facility]

## 2023-08-22 ENCOUNTER — HOME CARE VISIT (OUTPATIENT)
Dept: HOME HEALTH SERVICES | Facility: HOME HEALTHCARE | Age: 53
End: 2023-08-22
Payer: MEDICAID

## 2023-08-23 ENCOUNTER — HOME CARE VISIT (OUTPATIENT)
Dept: HOME HEALTH SERVICES | Facility: HOME HEALTHCARE | Age: 53
End: 2023-08-23
Payer: MEDICAID

## 2023-08-23 VITALS
HEART RATE: 100 BPM | BODY MASS INDEX: 53.21 KG/M2 | TEMPERATURE: 96.9 F | HEIGHT: 64 IN | SYSTOLIC BLOOD PRESSURE: 128 MMHG | DIASTOLIC BLOOD PRESSURE: 84 MMHG | OXYGEN SATURATION: 97 % | RESPIRATION RATE: 18 BRPM

## 2023-08-23 PROCEDURE — G0299 HHS/HOSPICE OF RN EA 15 MIN: HCPCS

## 2023-08-23 NOTE — Clinical Note
pt. needs package of 4x4 gauze;2  large normal saline bottles, 10 rolls of kerlix, 2 rolls of paper tape. box of abd pads.

## 2023-08-23 NOTE — HOME HEALTH
Recertification: 60 day recertification for wound on left foot.  Pt. had 2nd and 3rd toes amputated 2 months ago, and is still requiring wound care.  Wound vac is not being used at this time, wet to dry dressings are being done twice daily,performed by daughter who pt. lives with.  Pt. uses walker to ambulate around home, has had no recent falls. Pt.'s visits to be once weekly for wound care until she sees Dr. Donahue next month.  Patient was started on antibiotics x 2 days ago for foul smelling odor on wound.  Pt. is a diabetic, but does not check her blood glucose on a regulra base.     Focus of care: Diabetes Mellitus with foot ulcer    Next SN visit: CP assess, pain assess, falls assess, wound care and assess, DM education

## 2023-08-25 ENCOUNTER — HOME CARE VISIT (OUTPATIENT)
Dept: HOME HEALTH SERVICES | Facility: HOME HEALTHCARE | Age: 53
End: 2023-08-25
Payer: MEDICAID

## 2023-08-25 PROCEDURE — G0299 HHS/HOSPICE OF RN EA 15 MIN: HCPCS

## 2023-08-28 VITALS
HEART RATE: 86 BPM | DIASTOLIC BLOOD PRESSURE: 84 MMHG | OXYGEN SATURATION: 98 % | TEMPERATURE: 97 F | SYSTOLIC BLOOD PRESSURE: 144 MMHG | RESPIRATION RATE: 18 BRPM

## 2023-08-30 ENCOUNTER — HOME CARE VISIT (OUTPATIENT)
Dept: HOME HEALTH SERVICES | Facility: HOME HEALTHCARE | Age: 53
End: 2023-08-30
Payer: MEDICAID

## 2023-08-30 VITALS
TEMPERATURE: 97.9 F | OXYGEN SATURATION: 98 % | HEART RATE: 77 BPM | RESPIRATION RATE: 18 BRPM | SYSTOLIC BLOOD PRESSURE: 136 MMHG | DIASTOLIC BLOOD PRESSURE: 72 MMHG

## 2023-08-30 PROCEDURE — G0299 HHS/HOSPICE OF RN EA 15 MIN: HCPCS

## 2023-08-30 NOTE — HOME HEALTH
Routine Visit Note: Medications reviewed. Appetite well. No issues with elimination. Patient states she is complient with a diabetic diet.     Skill/education provided: Wound care completed. Vital signs stable. Education provided about wound healing and schedule. CP assessed. Cardiopulmonary assessment. Patient denies falls.     Patient/caregiver response: Patient verbilized understanding.     Plan for next visit: Vital signs, wound care, review medications, cardiopulmonary assessment, assess falls and pain    Other pertinent info: na

## 2023-09-05 ENCOUNTER — HOME CARE VISIT (OUTPATIENT)
Dept: HOME HEALTH SERVICES | Facility: HOME HEALTHCARE | Age: 53
End: 2023-09-05
Payer: MEDICAID

## 2023-09-06 ENCOUNTER — HOME CARE VISIT (OUTPATIENT)
Dept: HOME HEALTH SERVICES | Facility: HOME HEALTHCARE | Age: 53
End: 2023-09-06
Payer: MEDICAID

## 2023-09-06 ENCOUNTER — TELEPHONE (OUTPATIENT)
Dept: PODIATRY | Facility: CLINIC | Age: 53
End: 2023-09-06

## 2023-09-06 VITALS
RESPIRATION RATE: 19 BRPM | DIASTOLIC BLOOD PRESSURE: 78 MMHG | OXYGEN SATURATION: 97 % | HEART RATE: 78 BPM | SYSTOLIC BLOOD PRESSURE: 124 MMHG | TEMPERATURE: 97 F

## 2023-09-06 PROCEDURE — G0299 HHS/HOSPICE OF RN EA 15 MIN: HCPCS

## 2023-09-06 NOTE — HOME HEALTH
Routine Visit Note:  pt's daughter has been doing wound care daily, wound is filling in but the 4th toe on left foot is swollen, red, and c/o sensitivity.  SN to call Dr. Mancilla to review picture.     Skill/education provided: CP assess, wound care assess, falls assess    Patient/caregiver response: tolerated well    Plan for next visit: follow up phone call to Dr. Mancilla, CP assess, pain assess, falls assess    Other pertinent info: SN heard from Dr. Mancilla, her appt. was moved up to 9/11/23, when SN called to notify pt. , she stated she could not make it at that time and SN gave her the phone number to Dr. Mancilla to reschedule

## 2023-09-12 ENCOUNTER — TELEPHONE (OUTPATIENT)
Dept: ORTHOPEDIC SURGERY | Facility: CLINIC | Age: 53
End: 2023-09-12
Payer: MEDICAID

## 2023-09-12 ENCOUNTER — HOME CARE VISIT (OUTPATIENT)
Dept: HOME HEALTH SERVICES | Facility: HOME HEALTHCARE | Age: 53
End: 2023-09-12
Payer: MEDICAID

## 2023-09-12 VITALS
RESPIRATION RATE: 18 BRPM | TEMPERATURE: 98 F | HEART RATE: 76 BPM | DIASTOLIC BLOOD PRESSURE: 76 MMHG | OXYGEN SATURATION: 96 % | SYSTOLIC BLOOD PRESSURE: 133 MMHG

## 2023-09-12 PROCEDURE — G0299 HHS/HOSPICE OF RN EA 15 MIN: HCPCS

## 2023-09-12 NOTE — HOME HEALTH
Routine Visit Note:  pt. due for wound care to LLE, pt. rescheduled Dr. Donahue appt. and did not go, SN rescheduled again d/t 4th toe on left foot swollen and red.      Skill/education provided: wound care, CP assess, pain assess, DM education    Patient/caregiver response: tolerated well    Plan for next visit: CP assess, wound cre and assess, falls assess, pain assess    Other pertinent info: new appt. 9/27, SN ordered supplies today

## 2023-09-12 NOTE — TELEPHONE ENCOUNTER
SANDRA FROM [facility] CALLED IN WITH PATIENT REQUESTING A SOONER APPT THAN NOVEMBER DUE TO NEEDING TO FOLLOW UP ON A WOUND. HAVE SCHEDULED FOR 9/27/23.REQUESTED A MONDAY OR WEDNESDAY

## 2023-09-22 ENCOUNTER — HOME CARE VISIT (OUTPATIENT)
Dept: HOME HEALTH SERVICES | Facility: HOME HEALTHCARE | Age: 53
End: 2023-09-22
Payer: MEDICAID

## 2023-09-25 ENCOUNTER — TELEPHONE (OUTPATIENT)
Dept: PODIATRY | Facility: CLINIC | Age: 53
End: 2023-09-25

## 2023-09-25 NOTE — TELEPHONE ENCOUNTER
Hub staff attempted to follow warm transfer process and was unsuccessful    Caller: JANEL ROSA    Relationship to patient: SELF    Best call back number: [phone]    Patient is needing: PATIENT NEEDS TO RESCHEDULE APPT ON 9/27.

## 2023-09-26 ENCOUNTER — HOME CARE VISIT (OUTPATIENT)
Dept: HOME HEALTH SERVICES | Facility: HOME HEALTHCARE | Age: 53
End: 2023-09-26
Payer: MEDICAID

## 2023-10-02 ENCOUNTER — TELEPHONE (OUTPATIENT)
Dept: PODIATRY | Facility: CLINIC | Age: 53
End: 2023-10-02

## 2023-10-02 RX ORDER — DOXYCYCLINE HYCLATE 100 MG
100 TABLET ORAL 2 TIMES DAILY
Qty: 20 TABLET | Refills: 0 | Status: SHIPPED | OUTPATIENT
Start: 2023-10-02 | End: 2023-10-12

## 2023-10-02 NOTE — TELEPHONE ENCOUNTER
Patient called saying she has an infection in her foot. Asking if she can get an antibiotic sent in. She has canceled her last 2 appts and is now scheduled for 10/18. Last seen 7/26/2023

## 2023-10-03 ENCOUNTER — HOME CARE VISIT (OUTPATIENT)
Dept: HOME HEALTH SERVICES | Facility: HOME HEALTHCARE | Age: 53
End: 2023-10-03
Payer: MEDICAID

## 2023-10-03 VITALS
HEART RATE: 78 BPM | TEMPERATURE: 97.9 F | SYSTOLIC BLOOD PRESSURE: 124 MMHG | RESPIRATION RATE: 18 BRPM | DIASTOLIC BLOOD PRESSURE: 72 MMHG | OXYGEN SATURATION: 99 %

## 2023-10-03 PROCEDURE — G0299 HHS/HOSPICE OF RN EA 15 MIN: HCPCS

## 2023-10-03 NOTE — Clinical Note
This patient stated she has not been seen for almost a month. She has visits scheduled to be seen once a week. I was just trying to figure out why she has not been seen.

## 2023-10-03 NOTE — HOME HEALTH
Routine Visit Note: Medications reviewed. Appetite well. No issues with elimination. CP assessed. Patient denies falls and pain    Skill/education provided: Vital signs stable. WOund care completed; infection present. Patient is to be starting on abx today. Education provided about infection and completion of abx regimen. Unable to monitor blood sugar at this time due to no machine. Daughter states she is on a diabetic diet. Cardiopulmonary assessment completed.     Patient/caregiver response: Patient and daughter verbilized understanding.     Plan for next visit: Vital signs, wound care, review medications, assess falls and pain, cardiopulmonary assessment, assess diet and blood sugar    Other pertinent info: NA

## 2023-10-03 NOTE — Clinical Note
She did not answer the last 2 weeks when called.  ----- Message -----  From: Jaja Portillo RN  Sent: 10/3/2023   2:54 PM EDT  To: Rosario Jim RN, *      This patient stated she has not been seen for almost a month. She has visits scheduled to be seen once a week. I was just trying to figure out why she has not been seen.

## 2023-10-09 ENCOUNTER — HOME CARE VISIT (OUTPATIENT)
Dept: HOME HEALTH SERVICES | Facility: HOME HEALTHCARE | Age: 53
End: 2023-10-09
Payer: MEDICAID

## 2023-10-10 ENCOUNTER — HOME CARE VISIT (OUTPATIENT)
Dept: HOME HEALTH SERVICES | Facility: HOME HEALTHCARE | Age: 53
End: 2023-10-10
Payer: MEDICAID

## 2023-10-10 VITALS
SYSTOLIC BLOOD PRESSURE: 146 MMHG | RESPIRATION RATE: 19 BRPM | TEMPERATURE: 96.9 F | OXYGEN SATURATION: 97 % | HEART RATE: 76 BPM | DIASTOLIC BLOOD PRESSURE: 86 MMHG

## 2023-10-10 PROCEDURE — G0299 HHS/HOSPICE OF RN EA 15 MIN: HCPCS

## 2023-10-10 NOTE — HOME HEALTH
Routine Visit Note:  no changes in pt. status since last SN visit, wound care performed today, pt. tolerated well.     Skill/education provided: CP assess, pain assess, falls assess, wound care    Patient/caregiver response: tolerated well    Plan for next visit: CP assess, pain assess, falls assess,  follow up Dr. Donahue, ? discharge    Other pertinent info: n/a

## 2023-10-18 ENCOUNTER — OFFICE VISIT (OUTPATIENT)
Dept: PODIATRY | Facility: CLINIC | Age: 53
End: 2023-10-18
Payer: MEDICAID

## 2023-10-18 ENCOUNTER — HOME CARE VISIT (OUTPATIENT)
Dept: HOME HEALTH SERVICES | Facility: HOME HEALTHCARE | Age: 53
End: 2023-10-18
Payer: MEDICAID

## 2023-10-18 VITALS
OXYGEN SATURATION: 99 % | HEART RATE: 96 BPM | RESPIRATION RATE: 20 BRPM | HEIGHT: 64 IN | BODY MASS INDEX: 50.02 KG/M2 | WEIGHT: 293 LBS

## 2023-10-18 DIAGNOSIS — E66.01 MORBID OBESITY WITH BMI OF 50.0-59.9, ADULT: ICD-10-CM

## 2023-10-18 DIAGNOSIS — L97.524 CHRONIC ULCER OF LEFT FOOT WITH NECROSIS OF BONE: Primary | ICD-10-CM

## 2023-10-18 DIAGNOSIS — E11.42 DM TYPE 2 WITH DIABETIC PERIPHERAL NEUROPATHY: ICD-10-CM

## 2023-10-18 DIAGNOSIS — M21.962 ACQUIRED ANKLE/FOOT DEFORMITY, LEFT: ICD-10-CM

## 2023-10-18 RX ORDER — INSULIN GLARGINE 100 [IU]/ML
INJECTION, SOLUTION SUBCUTANEOUS
COMMUNITY
Start: 2023-09-01

## 2023-10-18 NOTE — PROGRESS NOTES
10/18/2023  Foot and Ankle Surgery - Established Patient/Follow-up  Provider: Dr. Elpidio Donahue DPM  Location: [facility]    Subjective:  Serina Powell is a 53 y.o. female.     Chief Complaint   Patient presents with    Left Foot - Foot Ulcer, Diabetes    Follow-up     No PCP       HPI: Serina Powell is a 53-year-old female who presents to the office today for a follow-up regarding her left foot.    The patient was last seen approximately 3 months ago. She reports improvement in her left foot wound, except for her 4th toe. She denies having an x-ray performed today. The patient states that she does not have a lot of supplies for her wound. She is still seeing home health.    Allergies   Allergen Reactions    Acetaminophen Unknown - High Severity    Codeine Unknown - High Severity    Pregabalin Unknown - High Severity    Topiramate Unknown - High Severity    Valdecoxib Unknown - High Severity    Tramadol Swelling    Naproxen Sodium Hives    Tylenol With Codeine #3 [Acetaminophen-Codeine] Hives       Current Outpatient Medications on File Prior to Visit   Medication Sig Dispense Refill    albuterol (PROVENTIL) (2.5 MG/3ML) 0.083% nebulizer solution Take 2.5 mg by nebulization Every 4 (Four) Hours As Needed for Wheezing. Indications: Spasm of Lung Air Passages      cyclobenzaprine (FLEXERIL) 5 MG tablet Take 1 tablet by mouth 3 (Three) Times a Day. Indications: Muscle Spasm      fluticasone-salmeterol (ADVAIR HFA) 45-21 MCG/ACT inhaler Inhale 2 puffs 2 (Two) Times a Day. Indications: Asthma      hydrOXYzine (ATARAX) 25 MG tablet Take one tablet my mouth twice a day as needed for anxiety 60 tablet 2    ibuprofen (ADVIL,MOTRIN) 800 MG tablet Take 1 tablet by mouth Every 6 (Six) Hours As Needed for Mild Pain. Indications: Pain      Insulin Glargine (BASAGLAR KWIKPEN) 100 UNIT/ML injection pen INJECT 20 UNITS INTO THE SKIN EVERY NIGHT AT BEDTIME      Insulin Lispro (ADMELOG SOLOSTAR) 100 UNIT/ML injection  "pen Inject 11 Units under the skin into the appropriate area as directed 3 (Three) Times a Day With Meals. 15 mL 0    metFORMIN (GLUCOPHAGE) 1000 MG tablet Take 1 tablet by mouth Every 12 (Twelve) Hours.      nebivolol (BYSTOLIC) 10 MG tablet Take 1 tablet by mouth Daily. Indications: High Blood Pressure Disorder      QUEtiapine (SEROquel) 25 MG tablet Take 1 tablet by mouth Every Night. Indications: Major Depressive Disorder      docusate sodium (COLACE) 100 MG capsule Take 1 capsule by mouth 2 (Two) Times a Day As Needed. Indications: Constipation (Patient not taking: Reported on 10/18/2023)      hydroCHLOROthiazide (HYDRODIURIL) 25 MG tablet Take 1 tablet by mouth Daily. Indications: Edema (Patient not taking: Reported on 10/18/2023)       No current facility-administered medications on file prior to visit.       Objective   Pulse 96   Resp 20   Ht 162.6 cm (64\")   Wt (!) 141 kg (310 lb)   SpO2 99%   BMI 53.21 kg/m²     Foot/Ankle Exam  GENERAL  Appearance:  appears stated age and obese  Orientation:  AAOx3  Affect:  appropriate     VASCULAR      Left Foot Vascularity   Dorsalis pedis:  2+  Posterior tibial:  2+  Skin temperature:  warm  Edema gradin+  CFT:  < 3 seconds  Pedal hair growth:  Absent     NEUROLOGIC      Left Foot Neurologic   Light touch sensation: diminished  Hot/Cold sensation:  diminished  Achilles reflex:  1+     MUSCULOSKELETAL      Left Foot Musculoskeletal   Tenderness:  dorsal foot tenderness and midtarsal joints tenderness  Arch:  Normal     MUSCLE STRENGTH      Left Foot Muscle Strength   Normal strength    Foot dorsiflexion:  5  Foot plantar flexion:  5  Foot inversion:  5  Foot eversion:  5     DERMATOLOGIC          Left Foot Dermatologic   Skin  Left foot skin is not intact. Positive for cellulitis, drainage, erythema, maceration, skin changes and ulcer.     Left foot additional comments: Patient has plantar wound with significant periwound maceration and skin necrosis with " deep tunneling to the level of bone.  Significant erythema involving the dorsal aspect of the forefoot.  Noted malodor and discoloration to the forefoot.    07/26/2023  Wound has improved with granular wound base and filled in. Wound now measures approximately 2.5 x 2.0 x 1.5 cm. Mild fibrotic wound base. No exposure of deep tissues. Mild plantar maceration.    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 No significant erythema, maceration, or drainage. Progressive adductovarus deformity involving the fourth digit with instability.    Assessment & Plan   Diagnoses and all orders for this visit:    1. Chronic ulcer of left foot with necrosis of bone (Primary)  -     XR Foot 3+ View Left    2. Acquired ankle/foot deformity, left    3. DM type 2 with diabetic peripheral neuropathy    4. Morbid obesity with BMI of 50.0-59.9, adult      Patient returns for evaluation of her left foot.  I have not seen her since July.  She has had issues with follow-ups due to transportation apparently.  She returns for evaluation of her left fourth toe.  She states that the wound from the previous amputation site is healed well but she has noticed longstanding wounds involving the dorsal and medial aspect of the fourth toe.  Clinically, the wounds extend to the level of bone.  Plain film imaging was obtained today showing significant osseous destruction involving the toe consistent with chronic osteomyelitis.  I discussed the finding with the patient at length.  I do feel that she would ultimately require at least amputation of the fourth digit for management but given her foot structure will likely require a partial fourth and fifth ray resections to allow for a functional foot long-term.  We briefly discussed the procedure and aftercare.  Patient does not want to consider any type of surgery at this time.  I have  placed her on a course of doxycycline and I would like her to follow-up with me in 2 weeks for reevaluation and further discussion.  Patient understands that infection can progress and that she can cause more issues to the foot if she does not follow-up.  I have asked that she report to the ED with any progressive erythema drainage, or further concerns.  Greater than 20 minutes spent before, during, and after evaluation for patient care.    Orders Placed This Encounter   Procedures    XR Foot 3+ View Left     Order Specific Question:   Reason for Exam:     Answer:   ulcer to 3rd toe  r/o osteomyelitis   room 9  wb  may leave after     Order Specific Question:   Does this patient have a diabetic monitoring/medication delivering device on?     Answer:   No     Order Specific Question:   Release to patient     Answer:   Routine Release [[id number]]          Note is dictated utilizing voice recognition software. Unfortunately this leads to occasional typographical errors. I apologize in advance if the situation occurs. If questions occur please do not hesitate to call our office.    Transcribed from ambient dictation for T Elpidio Donahue DPM by Cofmort Reilly.  10/18/23   10:15 EDT    Patient or patient representative verbalized consent to the visit recording.  I have personally performed the services described in this document as transcribed by the above individual, and it is both accurate and complete.

## 2023-10-19 ENCOUNTER — HOME CARE VISIT (OUTPATIENT)
Dept: HOME HEALTH SERVICES | Facility: HOME HEALTHCARE | Age: 53
End: 2023-10-19
Payer: MEDICAID

## 2023-10-19 PROCEDURE — G0299 HHS/HOSPICE OF RN EA 15 MIN: HCPCS

## 2023-10-19 RX ORDER — DOXYCYCLINE HYCLATE 100 MG
100 TABLET ORAL 2 TIMES DAILY
Qty: 20 TABLET | Refills: 0 | Status: SHIPPED | OUTPATIENT
Start: 2023-10-19 | End: 2023-10-29

## 2023-10-19 NOTE — HOME HEALTH
Recertification: 60 day recertification for wound on left foot. Pt. had 2nd and 3rd toes amputated 2 months ago, and is still requiring wound, pt. is now having upcoming surgery to have 4th and 5th toes amputated d/t continuing infections, dressing are betadine wet to dry are being done twice daily,performed by daughter who pt. lives with. Pt. uses walker to ambulate around home, has had no recent falls. Pt.'s visits to be once weekly for wound care until she sees Dr. Donahue next month for surgery. Patient was started on antibiotics x 2 days ago for foul smelling odor on wound. Pt. is a diabetic, but does not check her blood glucose on a regular base, pt. needs education for compliance.  Focus of care: Diabetes Mellitus with foot ulcer   Next SN visit: CP assess, pain assess, falls assess, wound care and assess, DM education

## 2023-10-23 VITALS
TEMPERATURE: 97 F | HEART RATE: 86 BPM | OXYGEN SATURATION: 93 % | DIASTOLIC BLOOD PRESSURE: 84 MMHG | SYSTOLIC BLOOD PRESSURE: 144 MMHG | RESPIRATION RATE: 19 BRPM

## 2023-10-25 ENCOUNTER — HOME CARE VISIT (OUTPATIENT)
Dept: HOME HEALTH SERVICES | Facility: HOME HEALTHCARE | Age: 53
End: 2023-10-25
Payer: MEDICAID

## 2023-10-25 VITALS
TEMPERATURE: 97 F | SYSTOLIC BLOOD PRESSURE: 144 MMHG | HEART RATE: 74 BPM | DIASTOLIC BLOOD PRESSURE: 100 MMHG | RESPIRATION RATE: 18 BRPM | OXYGEN SATURATION: 95 %

## 2023-10-25 PROCEDURE — G0299 HHS/HOSPICE OF RN EA 15 MIN: HCPCS

## 2023-10-26 NOTE — HOME HEALTH
Routine Visit Note:  no changes in status, pt. to see podiatrist next week, she is continuing dressing wet to dry with betadine daily.  Pt. is need of more supplies but has not called supply company via our  for information, SN gave pt. phone number again.     Skill/education provided: CP assess, pain assess, falls assess, wound care, DM education    Patient/caregiver response: tolerated well, verbalized understanding of importance of getting supplies, and getting rx. for lancets to monitor for BS    Plan for next visit: CP assess, pain assess, falls assess, wound care     Other pertinent info: n/a

## 2023-11-01 ENCOUNTER — HOME CARE VISIT (OUTPATIENT)
Dept: HOME HEALTH SERVICES | Facility: HOME HEALTHCARE | Age: 53
End: 2023-11-01
Payer: MEDICAID

## 2023-11-01 VITALS
DIASTOLIC BLOOD PRESSURE: 90 MMHG | OXYGEN SATURATION: 100 % | RESPIRATION RATE: 18 BRPM | TEMPERATURE: 97 F | HEART RATE: 86 BPM | SYSTOLIC BLOOD PRESSURE: 144 MMHG

## 2023-11-01 PROCEDURE — G0299 HHS/HOSPICE OF RN EA 15 MIN: HCPCS

## 2023-11-01 NOTE — HOME HEALTH
Routine Visit Note:  no changes in pt. status since last SN visit, pt. to see Dr. Donahue on Monday for consult for upcoming surgery on amputation for 4th and 5th toe on left foot, r/t infection.  Pt. is still not monitoring her Blood sugar, educated on importance of doing this and problems that high blood sugar will cause    Skill/education provided: CP assess, pain assess, falls assess, wound care and assess    Patient/caregiver response: tolerated well    Plan for next visit: CP assess, pain assess, falls assess, wound care and assess, follow up Dr. Donahue.    Other pertinent info: n/a

## 2023-11-06 ENCOUNTER — OFFICE VISIT (OUTPATIENT)
Dept: PODIATRY | Facility: CLINIC | Age: 53
End: 2023-11-06

## 2023-11-06 VITALS — OXYGEN SATURATION: 98 % | WEIGHT: 293 LBS | BODY MASS INDEX: 50.02 KG/M2 | HEIGHT: 64 IN

## 2023-11-06 DIAGNOSIS — L97.524 CHRONIC ULCER OF LEFT FOOT WITH NECROSIS OF BONE: Primary | ICD-10-CM

## 2023-11-06 DIAGNOSIS — M21.962 ACQUIRED ANKLE/FOOT DEFORMITY, LEFT: ICD-10-CM

## 2023-11-06 DIAGNOSIS — E11.42 DM TYPE 2 WITH DIABETIC PERIPHERAL NEUROPATHY: ICD-10-CM

## 2023-11-06 DIAGNOSIS — E66.01 MORBID OBESITY WITH BMI OF 50.0-59.9, ADULT: ICD-10-CM

## 2023-11-06 DIAGNOSIS — M86.172 ACUTE OSTEOMYELITIS OF LEFT FOOT: ICD-10-CM

## 2023-11-06 RX ORDER — DOXYCYCLINE HYCLATE 100 MG
100 TABLET ORAL 2 TIMES DAILY
Qty: 20 TABLET | Refills: 0 | Status: SHIPPED | OUTPATIENT
Start: 2023-11-06 | End: 2023-11-16

## 2023-11-06 NOTE — PROGRESS NOTES
11/06/2023  Foot and Ankle Surgery - Established Patient/Follow-up  Provider: Dr. Elpidio Donahue DPM  Location: [facility]    Subjective:  Serina Powell is a 53 y.o. female.     Chief Complaint   Patient presents with    Left Foot - Follow-up       HPI: She reports overall improvement to her left foot. She denies moisturizing the rest of her foot with Aquaphor. The patient states she is almost finished with doxycycline.    Allergies   Allergen Reactions    Acetaminophen Unknown - High Severity    Codeine Unknown - High Severity    Pregabalin Unknown - High Severity    Topiramate Unknown - High Severity    Valdecoxib Unknown - High Severity    Tramadol Swelling    Naproxen Sodium Hives    Tylenol With Codeine #3 [Acetaminophen-Codeine] Hives       Current Outpatient Medications on File Prior to Visit   Medication Sig Dispense Refill    albuterol (PROVENTIL) (2.5 MG/3ML) 0.083% nebulizer solution Take 2.5 mg by nebulization Every 4 (Four) Hours As Needed for Wheezing. Indications: Spasm of Lung Air Passages      cyclobenzaprine (FLEXERIL) 5 MG tablet Take 1 tablet by mouth 3 (Three) Times a Day. Indications: Muscle Spasm      fluticasone-salmeterol (ADVAIR HFA) 45-21 MCG/ACT inhaler Inhale 2 puffs 2 (Two) Times a Day. Indications: Asthma      hydrOXYzine (ATARAX) 25 MG tablet Take one tablet my mouth twice a day as needed for anxiety 60 tablet 2    ibuprofen (ADVIL,MOTRIN) 800 MG tablet Take 1 tablet by mouth Every 6 (Six) Hours As Needed for Mild Pain. Indications: Pain      Insulin Glargine (BASAGLAR KWIKPEN) 100 UNIT/ML injection pen INJECT 20 UNITS INTO THE SKIN EVERY NIGHT AT BEDTIME      Insulin Lispro (ADMELOG SOLOSTAR) 100 UNIT/ML injection pen Inject 11 Units under the skin into the appropriate area as directed 3 (Three) Times a Day With Meals. 15 mL 0    metFORMIN (GLUCOPHAGE) 1000 MG tablet Take 1 tablet by mouth Every 12 (Twelve) Hours. Indications: Type 2 Diabetes      nebivolol (BYSTOLIC)  "10 MG tablet Take 1 tablet by mouth Daily. Indications: High Blood Pressure Disorder      QUEtiapine (SEROquel) 25 MG tablet Take 1 tablet by mouth Every Night. Indications: Major Depressive Disorder      docusate sodium (COLACE) 100 MG capsule Take 1 capsule by mouth 2 (Two) Times a Day As Needed. Indications: Constipation (Patient not taking: Reported on 10/18/2023)      hydroCHLOROthiazide (HYDRODIURIL) 25 MG tablet Take 1 tablet by mouth Daily. Indications: Edema (Patient not taking: Reported on 10/18/2023)       No current facility-administered medications on file prior to visit.       Objective   Ht 162.6 cm (64\")   Wt (!) 141 kg (310 lb)   SpO2 98%   BMI 53.21 kg/m²     Foot/Ankle Exam    GENERAL  Orientation:  AAOx3  Affect:  appropriate    VASCULAR     Right Foot Vascularity   Normal vascular exam    Dorsalis pedis:  2+  Posterior tibial:  2+  Skin temperature:  warm  Edema grading:  None  CFT:  < 3 seconds  Pedal hair growth:  Present  Varicosities:  none     Left Foot Vascularity   Normal vascular exam    Dorsalis pedis:  2+  Posterior tibial:  2+  Skin temperature:  warm  Edema grading:  None  CFT:  < 3 seconds  Pedal hair growth:  Present  Varicosities:  none     NEUROLOGIC     Right Foot Neurologic   Light touch sensation: normal  Hot/Cold sensation: normal  Achilles reflex:  2+     Left Foot Neurologic   Light touch sensation: normal  Hot/Cold sensation:  normal  Achilles reflex:  2+    MUSCULOSKELETAL     Right Foot Musculoskeletal   Arch:  Normal     Left Foot Musculoskeletal   Arch:  Normal    MUSCLE STRENGTH     Right Foot Muscle Strength   Normal strength    Foot dorsiflexion:  5  Foot plantar flexion:  5  Foot inversion:  5  Foot eversion:  5     Left Foot Muscle Strength   Normal strength    Foot dorsiflexion:  5  Foot plantar flexion:  5  Foot inversion:  5  Foot eversion:  5    DERMATOLOGIC      Right Foot Dermatologic   Skin  Right foot skin is intact.   Nails comment:  Nails 1-5     Left " Foot Dermatologic   Skin  Left foot skin is intact.   Nails comment:  Nails 1-5    TESTS     Right Foot Tests   Anterior drawer: negative  Varus tilt: negative     Left Foot Tests   Anterior drawer: negative  Varus tilt: negative     Left foot additional comments: GENERAL  Appearance:  appears stated age and obese  Orientation:  AAOx3  Affect:  appropriate     VASCULAR      Left Foot Vascularity   Dorsalis pedis:  2+  Posterior tibial:  2+  Skin temperature:  warm  Edema gradin+  CFT:  < 3 seconds  Pedal hair growth:  Absent     NEUROLOGIC      Left Foot Neurologic   Light touch sensation: diminished  Hot/Cold sensation:  diminished  Achilles reflex:  1+     MUSCULOSKELETAL      Left Foot Musculoskeletal   Tenderness:  dorsal foot tenderness and midtarsal joints tenderness  Arch:  Normal     MUSCLE STRENGTH      Left Foot Muscle Strength   Normal strength    Foot dorsiflexion:  5  Foot plantar flexion:  5  Foot inversion:  5  Foot eversion:  5     DERMATOLOGIC          Left Foot Dermatologic   Skin  Left foot skin is not intact. Positive for cellulitis, drainage, erythema, maceration, skin changes and ulcer.     Left foot additional comments: Patient has plantar wound with significant periwound maceration and skin necrosis with deep tunneling to the level of bone.  Significant erythema involving the dorsal aspect of the forefoot.  Noted malodor and discoloration to the forefoot.    2023  Wound has improved with granular wound base and filled in. Wound now measures approximately 2.5 x 2.0 x 1.5 cm. Mild fibrotic wound base. No exposure of deep tissues. Mild plantar maceration.    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 No significant erythema, maceration, or drainage. Progressive adductovarus deformity involving the fourth digit with instability.    2023: Fourth toe appears  much improved today with less redness. Wounds are stable with overlying eschar. No drainage, malodor, or gross signs of local sepsis, progressive abduction deformity involving the fourth digit.      Assessment & Plan   Diagnoses and all orders for this visit:    1. Chronic ulcer of left foot with necrosis of bone (Primary)  -     XR Chest 2 View; Future  -     ECG 12 Lead; Future  -     Case Request; Standing  -     CBC (No Diff); Future  -     Basic Metabolic Panel; Future  -     ceFAZolin (ANCEF) 3,000 mg in sodium chloride 0.9 % 100 mL IVPB  -     Case Request    2. Acute osteomyelitis of left foot  -     XR Chest 2 View; Future  -     ECG 12 Lead; Future  -     Case Request; Standing  -     CBC (No Diff); Future  -     Basic Metabolic Panel; Future  -     ceFAZolin (ANCEF) 3,000 mg in sodium chloride 0.9 % 100 mL IVPB  -     Case Request    3. Acquired ankle/foot deformity, left    4. DM type 2 with diabetic peripheral neuropathy  -     XR Chest 2 View; Future  -     ECG 12 Lead; Future  -     Case Request; Standing  -     CBC (No Diff); Future  -     Basic Metabolic Panel; Future  -     ceFAZolin (ANCEF) 3,000 mg in sodium chloride 0.9 % 100 mL IVPB  -     Case Request    5. Morbid obesity with BMI of 50.0-59.9, adult    Other orders  -     doxycycline (VIBRAMYICN) 100 MG tablet; Take 1 tablet by mouth 2 (Two) Times a Day for 10 days.  Dispense: 20 tablet; Refill: 0  -     Follow Anesthesia Guidelines / Protocol; Future  -     Follow Anesthesia Guidelines / Protocol; Standing  -     Verify / Perform Chlorhexidine Skin Prep; Standing  -     Verify / Perform Chlorhexidine Skin Prep if Indicated (If Not Already Completed); Standing  -     Obtain Informed Consent; Future  -     Provide NPO Instructions to Patient; Future  -     Chlorhexidine Skin Prep; Future      Patient returns for follow-up involving her left foot.  She has noticed mild improvement since last visit.  She has not noticed any drainage and feels  that the swelling and redness has decreased.  I do agree that symptoms do look mildly improved.  We did review previous imaging which shows significant erosive changes involving the fourth digit concerning for osteomyelitis.  I do feel that she is at very high risk of progressive infection retaining the toe.  I have proposed partial fourth and fifth ray resection due to her overall foot structure.  I am worried that if we proceed with fourth toe amputation that she will reulcerate to the fourth metatarsal head or fifth ray.  I reviewed the procedure, risk, goals, and recovery with her at length.  She understands and agrees and would like to proceed.  We will plan for the near future.  I have prescribed doxycycline for her as she seems to be responding well for added suppression.  She is to call or report to the ED with any progressive signs of infection.  Greater than 20 minutes was spent before, during, and after evaluation for patient care.    Orders Placed This Encounter   Procedures    XR Chest 2 View     Standing Status:   Future     Standing Expiration Date:   11/7/2024     Order Specific Question:   Reason for Exam:     Answer:   Preop     Order Specific Question:   Release to patient     Answer:   Routine Release [[id number]]    CBC (No Diff)     Standing Status:   Future     Standing Expiration Date:   11/7/2024     Order Specific Question:   Release to patient     Answer:   Routine Release [[id number]]    Basic Metabolic Panel     Standing Status:   Future     Standing Expiration Date:   11/7/2024     Order Specific Question:   Release to patient     Answer:   Routine Release [[id number]]    Obtain Informed Consent     Standing Status:   Future     Order Specific Question:   Informed Consent Given For     Answer:   Partial fourth and fifth ray resections to the left foot    Provide NPO Instructions to Patient     Standing Status:   Future    Chlorhexidine Skin Prep     Chlorhexidine Skin Prep and  Instructions For All Patients Having A Procedure Requiring an Outward Incision if Not Allergic. If Allergic, Give Antibacterial Skin Wipes and Instructions. Do Not Use For Facial Cases or on Any Mucus Membranes.     Standing Status:   Future    ECG 12 Lead     Standing Status:   Future     Standing Expiration Date:   11/7/2024     Order Specific Question:   Reason for Exam:     Answer:   Preop     Order Specific Question:   Release to patient     Answer:   Routine Release [[id number]]          Note is dictated utilizing voice recognition software. Unfortunately this leads to occasional typographical errors. I apologize in advance if the situation occurs. If questions occur please do not hesitate to call our office.    Transcribed from ambient dictation for KASIE Donahue DPM by Matthew Johns.  11/06/23   12:11 EST    Patient or patient representative verbalized consent to the visit recording.  I have personally performed the services described in this document as transcribed by the above individual, and it is both accurate and complete.

## 2023-11-08 PROBLEM — E11.42 DM TYPE 2 WITH DIABETIC PERIPHERAL NEUROPATHY: Status: ACTIVE | Noted: 2023-11-06

## 2023-11-08 PROBLEM — M86.172 ACUTE OSTEOMYELITIS OF LEFT FOOT: Status: ACTIVE | Noted: 2023-11-06

## 2023-11-08 PROBLEM — L97.524 CHRONIC ULCER OF LEFT FOOT WITH NECROSIS OF BONE: Status: ACTIVE | Noted: 2023-11-06

## 2023-11-10 ENCOUNTER — HOME CARE VISIT (OUTPATIENT)
Dept: HOME HEALTH SERVICES | Facility: HOME HEALTHCARE | Age: 53
End: 2023-11-10
Payer: MEDICAID

## 2023-11-16 ENCOUNTER — HOME CARE VISIT (OUTPATIENT)
Dept: HOME HEALTH SERVICES | Facility: HOME HEALTHCARE | Age: 53
End: 2023-11-16
Payer: MEDICAID

## 2023-11-21 ENCOUNTER — HOME CARE VISIT (OUTPATIENT)
Dept: HOME HEALTH SERVICES | Facility: HOME HEALTHCARE | Age: 53
End: 2023-11-21
Payer: MEDICAID

## 2023-11-28 ENCOUNTER — HOME CARE VISIT (OUTPATIENT)
Dept: HOME HEALTH SERVICES | Facility: HOME HEALTHCARE | Age: 53
End: 2023-11-28

## 2023-12-06 ENCOUNTER — HOME CARE VISIT (OUTPATIENT)
Dept: HOME HEALTH SERVICES | Facility: HOME HEALTHCARE | Age: 53
End: 2023-12-06

## 2023-12-13 ENCOUNTER — TELEPHONE (OUTPATIENT)
Dept: PODIATRY | Facility: CLINIC | Age: 53
End: 2023-12-13
Payer: MEDICAID

## 2023-12-13 NOTE — TELEPHONE ENCOUNTER
Caller: Serina Powell    Relationship to patient: Self    Best call back number: [phone]    Chief complaint: LEFT FOOT     Type of visit: SURGERY     Requested date: PATIENT WAS SCHEDULED FOR SURGERY BUT HAD TO CANCEL DUE TO PROBLEMS WITH INSURANCE. SHE NOW HAS ANTHEM AND WOULD LIKE TO RESCHEDULE.      If rescheduling, when is the original appointment: 11-17-23     Additional notes: PLEASE CALL FOR SCHEDULING AND IT IS OKAY TO LEAVE A VOICEMAIL

## 2023-12-14 NOTE — TELEPHONE ENCOUNTER
PATIENT LAST SEEN ON 11/7. DUE TO INSURANCE PATIENT WAS UNABLE TO SCHEDULE. WAS GOING TO HAVE A PARTIAL 4TH & 5TH RAY RESECTION. PATIENT NOW HAS NEW INSURANCE AND READY TO SCHEDULE. DO YOU WANT TO SEE PATIENT BACK IN OFFICE OR OK TO SET A SURGERY DATE? THANKS!

## 2024-01-15 ENCOUNTER — OFFICE VISIT (OUTPATIENT)
Dept: PODIATRY | Facility: CLINIC | Age: 54
End: 2024-01-15
Payer: MEDICAID

## 2024-01-15 VITALS — BODY MASS INDEX: 50.02 KG/M2 | WEIGHT: 293 LBS | RESPIRATION RATE: 20 BRPM | HEIGHT: 64 IN

## 2024-01-15 DIAGNOSIS — E11.42 DM TYPE 2 WITH DIABETIC PERIPHERAL NEUROPATHY: ICD-10-CM

## 2024-01-15 DIAGNOSIS — E66.01 MORBID OBESITY WITH BMI OF 50.0-59.9, ADULT: ICD-10-CM

## 2024-01-15 DIAGNOSIS — L84 PRE-ULCERATIVE CALLUSES: Primary | ICD-10-CM

## 2024-01-15 DIAGNOSIS — M21.962 ACQUIRED ANKLE/FOOT DEFORMITY, LEFT: ICD-10-CM

## 2024-01-15 DIAGNOSIS — M86.672 CHRONIC OSTEOMYELITIS OF LEFT FOOT: ICD-10-CM

## 2024-01-15 PROCEDURE — 1159F MED LIST DOCD IN RCRD: CPT | Performed by: PODIATRIST

## 2024-01-15 PROCEDURE — 99213 OFFICE O/P EST LOW 20 MIN: CPT | Performed by: PODIATRIST

## 2024-01-15 PROCEDURE — 1160F RVW MEDS BY RX/DR IN RCRD: CPT | Performed by: PODIATRIST

## 2024-01-15 RX ORDER — DOXEPIN 6 MG/1
TABLET, FILM COATED ORAL
COMMUNITY
Start: 2024-01-10

## 2024-01-15 RX ORDER — QUETIAPINE FUMARATE 200 MG/1
200 TABLET, FILM COATED ORAL
COMMUNITY
Start: 2023-11-06

## 2024-01-15 NOTE — PROGRESS NOTES
"01/15/2024  Foot and Ankle Surgery - Established Patient/Follow-up  Provider: Dr. Elpidio Donahue DPM  Location: [facility]    Subjective:  Serina Powell is a 53 y.o. female.     Chief Complaint   Patient presents with    Left Foot - Follow-up, Pain, Foot Ulcer, Wound Check, Diabetes     Surgery consult    Follow-up     No pcp       HPI: The patient is a 53-year-old female who presents to the clinic for issues involving her feet.    She reports she has been \"hiding\" and wants to proceed with the surgery. She states the first surgery gave her PTSD and this is very hard for her. She notes it is anxiety. She states she has not slept all weekend because she knows she had to go to sleep. She reports when she was in the hospital, she felt like when she was speaking to her, she did not have a time to process her feelings. She notes she is working on it. She reports she did not get the surgery scheduled because her insurance messed up. She states she is still trying to find a regular doctor because when they confused her with her daughter, everything went away. She reports she had an x-ray today. She states she has lost a major weight. She notes she was 500 pounds. She states she has had trouble getting her A1c down for years.    Allergies   Allergen Reactions    Acetaminophen Unknown - High Severity    Codeine Unknown - High Severity    Pregabalin Unknown - High Severity    Topiramate Unknown - High Severity    Valdecoxib Unknown - High Severity    Tramadol Swelling    Naproxen Sodium Hives    Tylenol With Codeine #3 [Acetaminophen-Codeine] Hives       Current Outpatient Medications on File Prior to Visit   Medication Sig Dispense Refill    albuterol (PROVENTIL) (2.5 MG/3ML) 0.083% nebulizer solution Take 2.5 mg by nebulization Every 4 (Four) Hours As Needed for Wheezing. Indications: Spasm of Lung Air Passages      cyclobenzaprine (FLEXERIL) 5 MG tablet Take 1 tablet by mouth 3 (Three) Times a Day. " "Indications: Muscle Spasm      docusate sodium (COLACE) 100 MG capsule Take 1 capsule by mouth 2 (Two) Times a Day As Needed.      Doxepin HCl 6 MG tablet       fluticasone-salmeterol (ADVAIR HFA) 45-21 MCG/ACT inhaler Inhale 2 puffs 2 (Two) Times a Day. Indications: Asthma      hydroCHLOROthiazide (HYDRODIURIL) 25 MG tablet Take 1 tablet by mouth Daily.      hydrOXYzine (ATARAX) 25 MG tablet Take one tablet my mouth twice a day as needed for anxiety 60 tablet 2    ibuprofen (ADVIL,MOTRIN) 800 MG tablet Take 1 tablet by mouth Every 6 (Six) Hours As Needed for Mild Pain. Indications: Pain      Insulin Glargine (BASAGLAR KWIKPEN) 100 UNIT/ML injection pen INJECT 20 UNITS INTO THE SKIN EVERY NIGHT AT BEDTIME      Insulin Lispro (ADMELOG SOLOSTAR) 100 UNIT/ML injection pen Inject 11 Units under the skin into the appropriate area as directed 3 (Three) Times a Day With Meals. 15 mL 0    metFORMIN (GLUCOPHAGE) 1000 MG tablet Take 1 tablet by mouth Every 12 (Twelve) Hours. Indications: Type 2 Diabetes      nebivolol (BYSTOLIC) 10 MG tablet Take 1 tablet by mouth Daily. Indications: High Blood Pressure Disorder      QUEtiapine (SEROquel) 200 MG tablet Take 1 tablet by mouth every night at bedtime.      QUEtiapine (SEROquel) 25 MG tablet Take 1 tablet by mouth Every Night. Indications: Major Depressive Disorder       No current facility-administered medications on file prior to visit.       Objective   Resp 20   Ht 162.6 cm (64\")   Wt (!) 141 kg (310 lb)   BMI 53.21 kg/m²     Foot/Ankle Exam  GENERAL  Orientation:  AAOx3  Affect:  appropriate    VASCULAR     Right Foot Vascularity   Normal vascular exam    Dorsalis pedis:  2+  Posterior tibial:  2+  Skin temperature:  warm  Edema grading:  None  CFT:  < 3 seconds  Pedal hair growth:  Present  Varicosities:  none     Left Foot Vascularity   Normal vascular exam    Dorsalis pedis:  2+  Posterior tibial:  2+  Skin temperature:  warm  Edema grading:  None  CFT:  < 3 " seconds  Pedal hair growth:  Present  Varicosities:  none     NEUROLOGIC     Right Foot Neurologic   Light touch sensation: normal  Hot/Cold sensation: normal  Achilles reflex:  2+     Left Foot Neurologic   Light touch sensation: normal  Hot/Cold sensation:  normal  Achilles reflex:  2+    MUSCULOSKELETAL     Right Foot Musculoskeletal   Arch:  Normal     Left Foot Musculoskeletal   Arch:  Normal    MUSCLE STRENGTH     Right Foot Muscle Strength   Normal strength    Foot dorsiflexion:  5  Foot plantar flexion:  5  Foot inversion:  5  Foot eversion:  5     Left Foot Muscle Strength   Normal strength    Foot dorsiflexion:  5  Foot plantar flexion:  5  Foot inversion:  5  Foot eversion:  5    DERMATOLOGIC      Right Foot Dermatologic   Skin  Right foot skin is intact.   Nails comment:  Nails 1-5     Left Foot Dermatologic   Skin  Left foot skin is intact.   Nails comment:  Nails 1-5    TESTS     Right Foot Tests   Anterior drawer: negative  Varus tilt: negative     Left Foot Tests   Anterior drawer: negative  Varus tilt: negative     Left foot additional comments: GENERAL  Appearance:  appears stated age and obese  Orientation:  AAOx3  Affect:  appropriate     VASCULAR      Left Foot Vascularity   Dorsalis pedis:  2+  Posterior tibial:  2+  Skin temperature:  warm  Edema gradin+  CFT:  < 3 seconds  Pedal hair growth:  Absent     NEUROLOGIC      Left Foot Neurologic   Light touch sensation: diminished  Hot/Cold sensation:  diminished  Achilles reflex:  1+     MUSCULOSKELETAL      Left Foot Musculoskeletal   Tenderness:  dorsal foot tenderness and midtarsal joints tenderness  Arch:  Normal     MUSCLE STRENGTH      Left Foot Muscle Strength   Normal strength    Foot dorsiflexion:  5  Foot plantar flexion:  5  Foot inversion:  5  Foot eversion:  5     DERMATOLOGIC          Left Foot Dermatologic   Skin  Left foot skin is not intact. Positive for cellulitis, drainage, erythema, maceration, skin changes and ulcer.      Left foot additional comments: Patient has plantar wound with significant periwound maceration and skin necrosis with deep tunneling to the level of bone.  Significant erythema involving the dorsal aspect of the forefoot.  Noted malodor and discoloration to the forefoot.    07/26/2023  Wound has improved with granular wound base and filled in. Wound now measures approximately 2.5 x 2.0 x 1.5 cm. Mild fibrotic wound base. No exposure of deep tissues. Mild plantar maceration.    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 No significant erythema, maceration, or drainage. Progressive adductovarus deformity involving the fourth digit with instability.    11/06/2023: Fourth toe appears much improved today with less redness. Wounds are stable with overlying eschar. No drainage, malodor, or gross signs of local sepsis, progressive abduction deformity involving the fourth digit.    01/15/2024: Left foot is much more stable. Continued deformity involving the fourth toe, but no open wounds or signs of infection. Moderate to severe xerosis involving both feet. No progressive deformity or instability.    Assessment & Plan   Diagnoses and all orders for this visit:    1. Pre-ulcerative calluses (Primary)  -     XR Foot 3+ View Left    2. Chronic osteomyelitis of left foot    3. Acquired ankle/foot deformity, left    4. DM type 2 with diabetic peripheral neuropathy    5. Morbid obesity with BMI of 50.0-59.9, adult    The patient is a 53-year-old female who presents to the office today for a follow-up regarding her bilateral feet. Her left foot is much more stable. We discussed the etiology and biomechanics involved with her bilateral foot pain. I explained that her left 4th toe looks dramatically better than the last time I saw her. I recommend monitoring her left foot. I explained that if she has a large  active infection and she is diabetic, it can race up her foot into her leg. I explained that her foot structure is not the best structure in the world and it is stable. I explained that her left 5th toe is stable. I explained that she has all of these changes on her 4th toe. I explained that she needs to moisturize her feet with Aquaphor diabetic foot cream to be applied 1 to 2 times daily. I explained that she needs to check her feet to make sure she does not have any open wounds or infections. I explained that she needs to continue working towards medical management. I explained that her A1c is more than double what it should be. I explained that she needs to continue working on weight loss and watch what she is putting into her body. The patient will return to the office in 3 months for a routine diabetic foot check. Greater than 20 minutes was spent before, during, and after evaluation for patient care.    Orders Placed This Encounter   Procedures    XR Foot 3+ View Left     Order Specific Question:   Reason for Exam:     Answer:   ulcer rm 14 wb     Order Specific Question:   Release to patient     Answer:   Routine Release [[id number]]          Note is dictated utilizing voice recognition software. Unfortunately this leads to occasional typographical errors. I apologize in advance if the situation occurs. If questions occur please do not hesitate to call our office.    Transcribed from ambient dictation for KASIE Donahue DPM by Comfort Reilly.  01/15/24   10:44 EST    Patient or patient representative verbalized consent to the visit recording.  I have personally performed the services described in this document as transcribed by the above individual, and it is both accurate and complete.

## 2024-03-22 ENCOUNTER — OFFICE VISIT (OUTPATIENT)
Dept: FAMILY MEDICINE CLINIC | Facility: CLINIC | Age: 54
End: 2024-03-22
Payer: MEDICAID

## 2024-03-22 VITALS
WEIGHT: 293 LBS | DIASTOLIC BLOOD PRESSURE: 86 MMHG | SYSTOLIC BLOOD PRESSURE: 136 MMHG | HEART RATE: 89 BPM | OXYGEN SATURATION: 99 % | BODY MASS INDEX: 50.02 KG/M2 | TEMPERATURE: 98.8 F | HEIGHT: 64 IN

## 2024-03-22 DIAGNOSIS — E78.2 MIXED HYPERLIPIDEMIA: ICD-10-CM

## 2024-03-22 DIAGNOSIS — K21.9 GERD WITHOUT ESOPHAGITIS: Chronic | ICD-10-CM

## 2024-03-22 DIAGNOSIS — Z72.0 TOBACCO USE: ICD-10-CM

## 2024-03-22 DIAGNOSIS — E11.65 TYPE 2 DIABETES MELLITUS WITH HYPERGLYCEMIA, WITH LONG-TERM CURRENT USE OF INSULIN: ICD-10-CM

## 2024-03-22 DIAGNOSIS — G47.33 OBSTRUCTIVE SLEEP APNEA SYNDROME: ICD-10-CM

## 2024-03-22 DIAGNOSIS — J44.9 CHRONIC OBSTRUCTIVE PULMONARY DISEASE, UNSPECIFIED COPD TYPE: Chronic | ICD-10-CM

## 2024-03-22 DIAGNOSIS — R56.9 SEIZURES: ICD-10-CM

## 2024-03-22 DIAGNOSIS — G89.29 CHRONIC BILATERAL LOW BACK PAIN WITH BILATERAL SCIATICA: ICD-10-CM

## 2024-03-22 DIAGNOSIS — Z76.89 ENCOUNTER TO ESTABLISH CARE: Primary | ICD-10-CM

## 2024-03-22 DIAGNOSIS — Z11.59 NEED FOR HEPATITIS C SCREENING TEST: ICD-10-CM

## 2024-03-22 DIAGNOSIS — E66.01 CLASS 3 SEVERE OBESITY DUE TO EXCESS CALORIES WITH SERIOUS COMORBIDITY AND BODY MASS INDEX (BMI) OF 50.0 TO 59.9 IN ADULT: ICD-10-CM

## 2024-03-22 DIAGNOSIS — M54.41 CHRONIC BILATERAL LOW BACK PAIN WITH BILATERAL SCIATICA: ICD-10-CM

## 2024-03-22 DIAGNOSIS — F31.9 BIPOLAR 1 DISORDER: ICD-10-CM

## 2024-03-22 DIAGNOSIS — M54.42 CHRONIC BILATERAL LOW BACK PAIN WITH BILATERAL SCIATICA: ICD-10-CM

## 2024-03-22 DIAGNOSIS — Z79.4 TYPE 2 DIABETES MELLITUS WITH HYPERGLYCEMIA, WITH LONG-TERM CURRENT USE OF INSULIN: ICD-10-CM

## 2024-03-22 DIAGNOSIS — I10 ESSENTIAL HYPERTENSION: Chronic | ICD-10-CM

## 2024-03-22 PROBLEM — E66.813 CLASS 3 SEVERE OBESITY DUE TO EXCESS CALORIES WITH SERIOUS COMORBIDITY AND BODY MASS INDEX (BMI) OF 50.0 TO 59.9 IN ADULT: Status: ACTIVE | Noted: 2024-03-22

## 2024-03-22 RX ORDER — FAMOTIDINE 20 MG/1
20 TABLET, FILM COATED ORAL 2 TIMES DAILY
Qty: 60 TABLET | Refills: 1 | Status: SHIPPED | OUTPATIENT
Start: 2024-03-22

## 2024-03-22 RX ORDER — HYDROXYZINE HYDROCHLORIDE 25 MG/1
TABLET, FILM COATED ORAL
Qty: 60 TABLET | Refills: 2 | Status: SHIPPED | OUTPATIENT
Start: 2024-03-22

## 2024-03-22 RX ORDER — CYCLOBENZAPRINE HCL 5 MG
5 TABLET ORAL 3 TIMES DAILY
Qty: 60 TABLET | Refills: 0 | Status: SHIPPED | OUTPATIENT
Start: 2024-03-22

## 2024-03-22 RX ORDER — QUETIAPINE FUMARATE 25 MG/1
25 TABLET, FILM COATED ORAL NIGHTLY
Qty: 30 TABLET | Refills: 2 | Status: SHIPPED | OUTPATIENT
Start: 2024-03-22

## 2024-03-22 RX ORDER — HYDROCHLOROTHIAZIDE 25 MG/1
25 TABLET ORAL DAILY
Qty: 90 TABLET | Refills: 1 | Status: SHIPPED | OUTPATIENT
Start: 2024-03-22

## 2024-03-22 RX ORDER — QUETIAPINE FUMARATE 200 MG/1
200 TABLET, FILM COATED ORAL
Qty: 30 TABLET | Refills: 3 | Status: SHIPPED | OUTPATIENT
Start: 2024-03-22

## 2024-03-22 RX ORDER — IBUPROFEN 800 MG/1
800 TABLET ORAL 2 TIMES DAILY PRN
Qty: 60 TABLET | Refills: 1 | Status: SHIPPED | OUTPATIENT
Start: 2024-03-22

## 2024-03-22 RX ORDER — INSULIN LISPRO 100 U/ML
11 INJECTION, SOLUTION SUBCUTANEOUS
Qty: 15 ML | Refills: 1 | Status: SHIPPED | OUTPATIENT
Start: 2024-03-22

## 2024-03-22 RX ORDER — NEBIVOLOL 10 MG/1
10 TABLET ORAL DAILY
Qty: 90 TABLET | Refills: 1 | Status: SHIPPED | OUTPATIENT
Start: 2024-03-22

## 2024-03-22 RX ORDER — INSULIN GLARGINE 100 [IU]/ML
20 INJECTION, SOLUTION SUBCUTANEOUS NIGHTLY
Qty: 3 ML | Refills: 3 | Status: SHIPPED | OUTPATIENT
Start: 2024-03-22 | End: 2024-03-25

## 2024-03-22 NOTE — PATIENT INSTRUCTIONS
Call office for lab results if you have not received a call from our office or [employer] message in 1-2 days.    Continue current medications and treatment.
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
## 2024-03-22 NOTE — ASSESSMENT & PLAN NOTE
Patient's (Body mass index is 51.39 kg/m².) indicates that they are morbidly/severely obese (BMI > 40 or > 35 with obesity - related health condition) with health conditions that include hypertension, dyslipidemias, and GERD . Weight is unchanged. BMI  is above average; BMI management plan is completed. We discussed portion control and increasing exercise.

## 2024-03-25 DIAGNOSIS — Z79.4 TYPE 2 DIABETES MELLITUS WITH HYPERGLYCEMIA, WITH LONG-TERM CURRENT USE OF INSULIN: ICD-10-CM

## 2024-03-25 DIAGNOSIS — E11.65 TYPE 2 DIABETES MELLITUS WITH HYPERGLYCEMIA, WITH LONG-TERM CURRENT USE OF INSULIN: ICD-10-CM

## 2024-03-25 RX ORDER — INSULIN GLARGINE 100 [IU]/ML
20 INJECTION, SOLUTION SUBCUTANEOUS NIGHTLY
Qty: 15 ML | Refills: 1 | Status: SHIPPED | OUTPATIENT
Start: 2024-03-25 | End: 2024-03-27 | Stop reason: CLARIF

## 2024-03-27 ENCOUNTER — TELEPHONE (OUTPATIENT)
Dept: FAMILY MEDICINE CLINIC | Facility: CLINIC | Age: 54
End: 2024-03-27
Payer: MEDICAID

## 2024-03-27 DIAGNOSIS — Z79.4 TYPE 2 DIABETES MELLITUS WITH HYPERGLYCEMIA, WITH LONG-TERM CURRENT USE OF INSULIN: Primary | ICD-10-CM

## 2024-03-27 DIAGNOSIS — E11.65 TYPE 2 DIABETES MELLITUS WITH HYPERGLYCEMIA, WITH LONG-TERM CURRENT USE OF INSULIN: Primary | ICD-10-CM

## 2024-03-27 NOTE — TELEPHONE ENCOUNTER
Caller: Serina Powell    Relationship: Self    Best call back number: [phone]    What medication are you requesting: LANTUS 15 ML & NEEDLES     If a prescription is needed, what is your preferred pharmacy and phone number: [facility] [id number] [place] [facility] [place]  [address] [phone] [facility] [phone]      Additional notes: PT STATES THAT SHE IS OUT OF INSULIN AND HER INSURANCE WILL NOT COVER THE BASAGLAR THAT WAS SENT IN. PHARMACY RECOMMENDING IT BE REPLACED WITH LANTUS.

## 2024-03-28 ENCOUNTER — TELEPHONE (OUTPATIENT)
Dept: FAMILY MEDICINE CLINIC | Facility: CLINIC | Age: 54
End: 2024-03-28
Payer: MEDICAID

## 2024-03-28 DIAGNOSIS — E11.65 TYPE 2 DIABETES MELLITUS WITH HYPERGLYCEMIA, WITH LONG-TERM CURRENT USE OF INSULIN: ICD-10-CM

## 2024-03-28 DIAGNOSIS — Z79.4 TYPE 2 DIABETES MELLITUS WITH HYPERGLYCEMIA, WITH LONG-TERM CURRENT USE OF INSULIN: ICD-10-CM

## 2024-03-28 NOTE — TELEPHONE ENCOUNTER
Caller: Serina Powell    Relationship: Self    Best call back number: [phone]     What medication are you requesting: LANTUS    If a prescription is needed, what is your preferred pharmacy and phone number: [id number] - [place], [place] - [address] AT [facility] [address] [facility] [phone] [facility] [phone]      Additional notes: PATIENT STATES MEDICATION WAS SENT TO INCORRECT PHARMACY YESTERDAY, PLEASE RESEND. PLEASE ADVISE WHEN RE-SENT, PATIENT IS OUT OF MEDICATION.

## 2024-04-15 DIAGNOSIS — M54.41 CHRONIC BILATERAL LOW BACK PAIN WITH BILATERAL SCIATICA: ICD-10-CM

## 2024-04-15 DIAGNOSIS — G89.29 CHRONIC BILATERAL LOW BACK PAIN WITH BILATERAL SCIATICA: ICD-10-CM

## 2024-04-15 DIAGNOSIS — M54.42 CHRONIC BILATERAL LOW BACK PAIN WITH BILATERAL SCIATICA: ICD-10-CM

## 2024-04-15 RX ORDER — CYCLOBENZAPRINE HCL 5 MG
TABLET ORAL
Qty: 60 TABLET | Refills: 0 | Status: SHIPPED | OUTPATIENT
Start: 2024-04-15

## 2024-04-22 ENCOUNTER — TELEPHONE (OUTPATIENT)
Dept: FAMILY MEDICINE CLINIC | Facility: CLINIC | Age: 54
End: 2024-04-22
Payer: MEDICAID

## 2024-04-22 NOTE — TELEPHONE ENCOUNTER
Serina Powell (Key: [id number])  PA Case ID #: [id number]  Rx #: [id number]  Need Help? Call us at [phone]  Outcome: Approved today  PA Case: [id number], Status: Approved, Coverage Starts on: 4/22/2024 12:00:00 AM, Coverage Ends on: 4/22/2025 12:00:00 AM.  Authorization Expiration Date: 4/21/2025  Drug: Admelog SoloStar 100UNIT/ML pen-injectors

## 2024-05-02 DIAGNOSIS — K21.9 GERD WITHOUT ESOPHAGITIS: Chronic | ICD-10-CM

## 2024-05-02 DIAGNOSIS — M54.41 CHRONIC BILATERAL LOW BACK PAIN WITH BILATERAL SCIATICA: ICD-10-CM

## 2024-05-02 DIAGNOSIS — M54.42 CHRONIC BILATERAL LOW BACK PAIN WITH BILATERAL SCIATICA: ICD-10-CM

## 2024-05-02 DIAGNOSIS — G89.29 CHRONIC BILATERAL LOW BACK PAIN WITH BILATERAL SCIATICA: ICD-10-CM

## 2024-05-02 RX ORDER — FAMOTIDINE 20 MG/1
20 TABLET, FILM COATED ORAL 2 TIMES DAILY
Qty: 60 TABLET | Refills: 1 | Status: SHIPPED | OUTPATIENT
Start: 2024-05-02

## 2024-05-02 RX ORDER — IBUPROFEN 800 MG/1
800 TABLET ORAL 2 TIMES DAILY PRN
Qty: 60 TABLET | Refills: 1 | Status: SHIPPED | OUTPATIENT
Start: 2024-05-02

## 2024-06-16 DIAGNOSIS — F31.9 BIPOLAR 1 DISORDER: ICD-10-CM

## 2024-06-16 DIAGNOSIS — R56.9 SEIZURES: ICD-10-CM

## 2024-06-17 RX ORDER — QUETIAPINE FUMARATE 25 MG/1
25 TABLET, FILM COATED ORAL NIGHTLY PRN
Qty: 30 TABLET | Refills: 2 | OUTPATIENT
Start: 2024-06-17

## 2024-06-18 DIAGNOSIS — F31.9 BIPOLAR 1 DISORDER: ICD-10-CM

## 2024-06-18 RX ORDER — HYDROXYZINE HYDROCHLORIDE 25 MG/1
TABLET, FILM COATED ORAL
Qty: 60 TABLET | Refills: 2 | OUTPATIENT
Start: 2024-06-18

## 2024-06-24 DIAGNOSIS — M54.42 CHRONIC BILATERAL LOW BACK PAIN WITH BILATERAL SCIATICA: ICD-10-CM

## 2024-06-24 DIAGNOSIS — G89.29 CHRONIC BILATERAL LOW BACK PAIN WITH BILATERAL SCIATICA: ICD-10-CM

## 2024-06-24 DIAGNOSIS — M54.41 CHRONIC BILATERAL LOW BACK PAIN WITH BILATERAL SCIATICA: ICD-10-CM

## 2024-06-24 RX ORDER — CYCLOBENZAPRINE HCL 5 MG
5 TABLET ORAL 3 TIMES DAILY PRN
Qty: 60 TABLET | Refills: 0 | Status: SHIPPED | OUTPATIENT
Start: 2024-06-24

## 2024-06-30 ENCOUNTER — HOSPITAL ENCOUNTER (EMERGENCY)
Facility: HOSPITAL | Age: 54
Discharge: LEFT BEFORE DISCHARGE | End: 2024-06-30
Attending: EMERGENCY MEDICINE
Payer: MEDICAID

## 2024-06-30 VITALS
RESPIRATION RATE: 12 BRPM | WEIGHT: 293 LBS | DIASTOLIC BLOOD PRESSURE: 75 MMHG | BODY MASS INDEX: 50.02 KG/M2 | TEMPERATURE: 97.2 F | HEIGHT: 64 IN | SYSTOLIC BLOOD PRESSURE: 147 MMHG | OXYGEN SATURATION: 100 % | HEART RATE: 75 BPM

## 2024-06-30 PROCEDURE — 99211 OFF/OP EST MAY X REQ PHY/QHP: CPT | Performed by: EMERGENCY MEDICINE

## 2024-07-22 DIAGNOSIS — M54.41 CHRONIC BILATERAL LOW BACK PAIN WITH BILATERAL SCIATICA: ICD-10-CM

## 2024-07-22 DIAGNOSIS — K21.9 GERD WITHOUT ESOPHAGITIS: Chronic | ICD-10-CM

## 2024-07-22 DIAGNOSIS — F31.9 BIPOLAR 1 DISORDER: ICD-10-CM

## 2024-07-22 DIAGNOSIS — R56.9 SEIZURES: ICD-10-CM

## 2024-07-22 DIAGNOSIS — M54.42 CHRONIC BILATERAL LOW BACK PAIN WITH BILATERAL SCIATICA: ICD-10-CM

## 2024-07-22 DIAGNOSIS — G89.29 CHRONIC BILATERAL LOW BACK PAIN WITH BILATERAL SCIATICA: ICD-10-CM

## 2024-07-22 RX ORDER — FAMOTIDINE 20 MG/1
20 TABLET, FILM COATED ORAL 2 TIMES DAILY
Qty: 60 TABLET | Refills: 1 | Status: SHIPPED | OUTPATIENT
Start: 2024-07-22

## 2024-07-22 RX ORDER — QUETIAPINE FUMARATE 200 MG/1
200 TABLET, FILM COATED ORAL
Qty: 30 TABLET | Refills: 3 | Status: SHIPPED | OUTPATIENT
Start: 2024-07-22

## 2024-07-22 RX ORDER — IBUPROFEN 800 MG/1
800 TABLET ORAL 2 TIMES DAILY PRN
Qty: 30 TABLET | Refills: 0 | Status: SHIPPED | OUTPATIENT
Start: 2024-07-22

## 2024-08-16 ENCOUNTER — TELEPHONE (OUTPATIENT)
Dept: FAMILY MEDICINE CLINIC | Facility: CLINIC | Age: 54
End: 2024-08-16
Payer: MEDICAID

## 2024-08-27 DIAGNOSIS — Z79.4 TYPE 2 DIABETES MELLITUS WITH HYPERGLYCEMIA, WITH LONG-TERM CURRENT USE OF INSULIN: ICD-10-CM

## 2024-08-27 DIAGNOSIS — E11.65 TYPE 2 DIABETES MELLITUS WITH HYPERGLYCEMIA, WITH LONG-TERM CURRENT USE OF INSULIN: ICD-10-CM

## 2024-08-27 RX ORDER — INSULIN GLARGINE 100 [IU]/ML
INJECTION, SOLUTION SUBCUTANEOUS
Refills: 1 | OUTPATIENT
Start: 2024-08-27

## 2024-09-18 DIAGNOSIS — K21.9 GERD WITHOUT ESOPHAGITIS: Chronic | ICD-10-CM

## 2024-09-18 RX ORDER — FAMOTIDINE 20 MG/1
20 TABLET, FILM COATED ORAL 2 TIMES DAILY
Qty: 60 TABLET | Refills: 1 | OUTPATIENT
Start: 2024-09-18

## 2024-09-20 DIAGNOSIS — I10 ESSENTIAL HYPERTENSION: Chronic | ICD-10-CM

## 2024-09-20 RX ORDER — NEBIVOLOL 10 MG/1
TABLET ORAL
Qty: 90 TABLET | Refills: 1 | OUTPATIENT
Start: 2024-09-20

## 2024-09-20 RX ORDER — HYDROCHLOROTHIAZIDE 25 MG/1
25 TABLET ORAL DAILY PRN
Qty: 90 TABLET | Refills: 1 | OUTPATIENT
Start: 2024-09-20

## 2024-10-10 DIAGNOSIS — E11.65 TYPE 2 DIABETES MELLITUS WITH HYPERGLYCEMIA, WITH LONG-TERM CURRENT USE OF INSULIN: ICD-10-CM

## 2024-10-10 DIAGNOSIS — Z79.4 TYPE 2 DIABETES MELLITUS WITH HYPERGLYCEMIA, WITH LONG-TERM CURRENT USE OF INSULIN: ICD-10-CM

## 2024-10-10 NOTE — TELEPHONE ENCOUNTER
Caller: Hernandez Powella L    Relationship: Self    Best call back number: [phone]     Requested Prescriptions:   Requested Prescriptions     Pending Prescriptions Disp Refills    Insulin Glargine (LANTUS SOLOSTAR) 100 UNIT/ML injection pen 15 mL 1     Sig: Inject 20 Units under the skin into the appropriate area as directed Every Night.        Pharmacy where request should be sent: [facility] #[id number] - [address] [phone] [facility] [phone]      Last office visit with prescribing clinician: 3/22/2024   Last telemedicine visit with prescribing clinician: Visit date not found   Next office visit with prescribing clinician: 10/21/2024     Additional details provided by patient: PATIENT IS COMPLETELY OUT    Does the patient have less than a 3 day supply:  [x] Yes  [] No    Would you like a call back once the refill request has been completed: [] Yes [] No    If the office needs to give you a call back, can they leave a voicemail: [] Yes [] No    Anna Parker Rep   10/10/24 14:13 EDT

## 2024-10-30 ENCOUNTER — TELEPHONE (OUTPATIENT)
Dept: FAMILY MEDICINE CLINIC | Facility: CLINIC | Age: 54
End: 2024-10-30
Payer: MEDICAID

## 2024-11-16 DIAGNOSIS — F31.9 BIPOLAR 1 DISORDER: ICD-10-CM

## 2024-11-16 DIAGNOSIS — R56.9 SEIZURES: ICD-10-CM

## 2024-11-16 RX ORDER — QUETIAPINE FUMARATE 200 MG/1
200 TABLET, FILM COATED ORAL
Qty: 30 TABLET | Refills: 3 | OUTPATIENT
Start: 2024-11-16

## 2024-11-19 NOTE — TELEPHONE ENCOUNTER
"Hub to relay:    Per PCP office, this medication needs to be refilled by jo-ann.  We forwarded the request. KAMALA Ortiz at behavioral health dept responded with:  \"It looks like she had two new patient appointments with me, but didn't keep either one. So we never actually saw her, and they won't reschedule her again per our policy.\"    Patient will need to find another psychologist that accepts her insurance that we can send her referral to.  "

## 2024-11-20 DIAGNOSIS — F31.9 BIPOLAR 1 DISORDER: ICD-10-CM

## 2024-11-20 DIAGNOSIS — M54.41 CHRONIC BILATERAL LOW BACK PAIN WITH BILATERAL SCIATICA: ICD-10-CM

## 2024-11-20 DIAGNOSIS — E11.65 TYPE 2 DIABETES MELLITUS WITH HYPERGLYCEMIA, WITH LONG-TERM CURRENT USE OF INSULIN: ICD-10-CM

## 2024-11-20 DIAGNOSIS — R56.9 SEIZURES: ICD-10-CM

## 2024-11-20 DIAGNOSIS — G89.29 CHRONIC BILATERAL LOW BACK PAIN WITH BILATERAL SCIATICA: ICD-10-CM

## 2024-11-20 DIAGNOSIS — M54.42 CHRONIC BILATERAL LOW BACK PAIN WITH BILATERAL SCIATICA: ICD-10-CM

## 2024-11-20 DIAGNOSIS — Z79.4 TYPE 2 DIABETES MELLITUS WITH HYPERGLYCEMIA, WITH LONG-TERM CURRENT USE OF INSULIN: ICD-10-CM

## 2024-11-20 RX ORDER — QUETIAPINE FUMARATE 200 MG/1
200 TABLET, FILM COATED ORAL
Qty: 30 TABLET | Refills: 3 | OUTPATIENT
Start: 2024-11-20

## 2024-11-20 RX ORDER — CYCLOBENZAPRINE HCL 5 MG
5 TABLET ORAL 3 TIMES DAILY PRN
Qty: 60 TABLET | Refills: 0 | OUTPATIENT
Start: 2024-11-20

## 2024-11-20 RX ORDER — INSULIN LISPRO 100 U/ML
11 INJECTION, SOLUTION SUBCUTANEOUS
Qty: 15 ML | Refills: 1 | OUTPATIENT
Start: 2024-11-20

## 2024-11-20 RX ORDER — QUETIAPINE FUMARATE 25 MG/1
25 TABLET, FILM COATED ORAL NIGHTLY
Qty: 30 TABLET | Refills: 2 | OUTPATIENT
Start: 2024-11-20

## 2024-11-20 NOTE — TELEPHONE ENCOUNTER
Hub to relay:    Patient has canceled/missed her last couple of appointments. Must be seen for refills.

## 2024-11-20 NOTE — TELEPHONE ENCOUNTER
Caller: Serina Powell    Relationship: Self    Best call back number: [phone]    Requested Prescriptions:   Requested Prescriptions     Pending Prescriptions Disp Refills    metFORMIN (GLUCOPHAGE) 1000 MG tablet 180 tablet 1     Sig: Take 1 tablet by mouth Every 12 (Twelve) Hours. Indications: Type 2 Diabetes    Insulin Lispro (ADMELOG SOLOSTAR) 100 UNIT/ML injection pen 15 mL 1     Sig: Inject 11 Units under the skin into the appropriate area as directed 3 (Three) Times a Day With Meals. Indications: Type 2 Diabetes    QUEtiapine (SEROquel) 200 MG tablet 30 tablet 3     Sig: Take 1 tablet by mouth every night at bedtime.    QUEtiapine (SEROquel) 25 MG tablet 30 tablet 2     Sig: Take 1 tablet by mouth Every Night. Indications: Major Depressive Disorder    cyclobenzaprine (FLEXERIL) 5 MG tablet 60 tablet 0     Sig: Take 1 tablet by mouth 3 (Three) Times a Day As Needed.        Pharmacy where request should be sent: [facility]/PHARMACY #[id number] - [address] [phone] [facility] [phone]      Last office visit with prescribing clinician: 3/22/2024   Last telemedicine visit with prescribing clinician: Visit date not found   Next office visit with prescribing clinician: Visit date not found     Does the patient have less than a 3 day supply:  [x] Yes  [] No    Anna Granger Rep   11/20/24 12:52 EST

## 2024-11-21 DIAGNOSIS — M54.42 CHRONIC BILATERAL LOW BACK PAIN WITH BILATERAL SCIATICA: ICD-10-CM

## 2024-11-21 DIAGNOSIS — R56.9 SEIZURES: ICD-10-CM

## 2024-11-21 DIAGNOSIS — F31.9 BIPOLAR 1 DISORDER: ICD-10-CM

## 2024-11-21 DIAGNOSIS — G89.29 CHRONIC BILATERAL LOW BACK PAIN WITH BILATERAL SCIATICA: ICD-10-CM

## 2024-11-21 DIAGNOSIS — M54.41 CHRONIC BILATERAL LOW BACK PAIN WITH BILATERAL SCIATICA: ICD-10-CM

## 2024-11-21 NOTE — TELEPHONE ENCOUNTER
Name: Serina Powell    Relationship: Self    Best Callback Number: [phone]    HUB PROVIDED THE RELAY MESSAGE FROM THE OFFICE. PATIENT VOICED UNDERSTANDING AND HAS NO FURTHER QUESTIONS AT THIS TIME.     ADDITIONAL INFORMATION: PT STATES SHE DOES NOT KNOW ANY PSYCHOLOGIST, SO SHE DOES NOT HAVE A PREFERENCE. STATES SHE DOES NOT KNOW WHY SHE HAS TO GO THROUGH PSYCH FOR THIS MEDICATION AS SHE HAS NEVER SEEN ONE BEFORE FOR THIS MEDICATION.

## 2024-11-22 RX ORDER — QUETIAPINE FUMARATE 200 MG/1
200 TABLET, FILM COATED ORAL
Qty: 30 TABLET | Refills: 3 | OUTPATIENT
Start: 2024-11-22

## 2024-11-22 RX ORDER — CYCLOBENZAPRINE HCL 5 MG
5 TABLET ORAL 3 TIMES DAILY PRN
Qty: 60 TABLET | Refills: 0 | OUTPATIENT
Start: 2024-11-22

## 2024-11-22 NOTE — TELEPHONE ENCOUNTER
Hub to relay:    Attempted to call patient for today's appt.  Per [facility]:  Patient has been advised the controlled medications need to be filled by psych. [facility] does not fill these medications so [facility] is not going to fill them either. She will have to find a psychologist since she was dismissed due to missed appts from Nga Low's office.  Patient was also referred to [facility] for diabetes management back in March. She needs to call and schedule her referral with Dr. Loredo's office for her diabetes medications.

## 2024-11-22 NOTE — TELEPHONE ENCOUNTER
Hub to relay:    Attempted to call patient. No answer. Unable to lvm.  The providers in this office do not fill psych meds or controlled substances. She must get these from psych.  She was also referred to Endocrine in March for her diabetes management which she also did not schedule an appt for that.  She must see endo to manage and refill her diabetes medication.  Patient must schedule appts with psych and endo.

## 2024-11-25 ENCOUNTER — OFFICE VISIT (OUTPATIENT)
Dept: FAMILY MEDICINE CLINIC | Facility: CLINIC | Age: 54
End: 2024-11-25
Payer: MEDICAID

## 2024-11-25 VITALS
DIASTOLIC BLOOD PRESSURE: 85 MMHG | TEMPERATURE: 97.6 F | WEIGHT: 293 LBS | HEART RATE: 81 BPM | RESPIRATION RATE: 18 BRPM | OXYGEN SATURATION: 99 % | SYSTOLIC BLOOD PRESSURE: 137 MMHG | HEIGHT: 64 IN | BODY MASS INDEX: 50.02 KG/M2

## 2024-11-25 DIAGNOSIS — Z79.899 ENCOUNTER FOR MEDICATION MANAGEMENT: Primary | ICD-10-CM

## 2024-11-25 NOTE — PROGRESS NOTES
Subjective        Serina Powell is a 54 y.o. female.     Chief Complaint   Patient presents with    Med Refill       History of Present Illness  Patient is here wanting to get her meds refilled.   I reviewed the notes in the chart from [facility]  Patient had labs that were ordered that the patient never completed. She has been noncompliant with having any of the testing and referrals completed.   She had referrals that she never followed thru with.   We have attempted to contact her to get her to complete her labs.   She has chronic medical problems that need to be addressed but she also has medications that if not monitored will or can cause significant adverse effects.   I told her we would not fire her from the practice that we indeed wanted to help her but without her completing all her ordered labs and testing I can not fill her meds today.   If she chooses not to complete the testing and labs then I feel she needs to find health care somewhere else .   There was a family member present today who did not verbalize one way or the other that she had any questions or offer any information.   I did instruct patient to contact [facility] for her psychiatric medications that they have walk in available.   I told her that [facility] has psychiatric services also.   Patient stool up said thank you and left   With review of notes I also saw on 6/30/2024 patient went to [facility] but left without being seen.     The following portions of the patient's history were reviewed and updated as appropriate: allergies, current medications, past family history, past medical history, past social history, past surgical history and problem list.      Current Outpatient Medications:     albuterol (PROVENTIL) (2.5 MG/3ML) 0.083% nebulizer solution, Take 2.5 mg by nebulization Every 4 (Four) Hours As Needed for Wheezing. Indications: Spasm of Lung Air Passages, Disp: , Rfl:     cyclobenzaprine (FLEXERIL) 5 MG tablet, TAKE 1 TABLET BY  MOUTH THREE TIMES A DAY AS NEEDED FOR MUSCLE SPASMS, Disp: 60 tablet, Rfl: 0    docusate sodium (COLACE) 100 MG capsule, Take 1 capsule by mouth 2 (Two) Times a Day As Needed. Indications: Constipation, Disp: , Rfl:     Doxepin HCl 6 MG tablet, , Disp: , Rfl:     famotidine (PEPCID) 20 MG tablet, TAKE 1 TABLET BY MOUTH TWICE A DAY, Disp: 60 tablet, Rfl: 1    fluticasone-salmeterol (ADVAIR HFA) 45-21 MCG/ACT inhaler, Inhale 2 puffs 2 (Two) Times a Day. Indications: Asthma, Disp: , Rfl:     hydroCHLOROthiazide 25 MG tablet, Take 1 tablet by mouth Daily. Indications: Edema, Disp: 90 tablet, Rfl: 1    hydrOXYzine (ATARAX) 25 MG tablet, Take one tablet my mouth twice a day as needed for anxiety  Indications: Feeling Anxious, Disp: 60 tablet, Rfl: 2    ibuprofen (ADVIL,MOTRIN) 800 MG tablet, TAKE 1 TABLET BY MOUTH TWICE A DAY AS NEEDED FOR MILD PAIN, Disp: 30 tablet, Rfl: 0    Insulin Glargine (LANTUS SOLOSTAR) 100 UNIT/ML injection pen, Inject 20 Units under the skin into the appropriate area as directed Every Night., Disp: 15 mL, Rfl: 1    Insulin Lispro (ADMELOG SOLOSTAR) 100 UNIT/ML injection pen, Inject 11 Units under the skin into the appropriate area as directed 3 (Three) Times a Day With Meals. Indications: Type 2 Diabetes, Disp: 15 mL, Rfl: 1    Insulin Lispro (ADMELOG SOLOSTAR) 100 UNIT/ML injection pen, INJECT 11 UNITS UNDER THE SKIN 3 TIMES A DAY WITH MEALS AS DIRECTED, Disp: , Rfl:     metFORMIN (GLUCOPHAGE) 1000 MG tablet, Take 1 tablet by mouth Every 12 (Twelve) Hours for 30 days., Disp: 60 tablet, Rfl: 1    metFORMIN (GLUCOPHAGE) 1000 MG tablet, Take 1 tablet by mouth Every 12 (Twelve) Hours. Indications: Type 2 Diabetes, Disp: 180 tablet, Rfl: 1    nebivolol (BYSTOLIC) 10 MG tablet, Take 1 tablet by mouth Daily. Indications: High Blood Pressure Disorder, Disp: 90 tablet, Rfl: 1    QUEtiapine (SEROquel) 200 MG tablet, TAKE 1 TABLET BY MOUTH AT BEDTIME, Disp: 30 tablet, Rfl: 3    QUEtiapine (SEROquel) 25 MG  "tablet, Take 1 tablet by mouth Every Night. Indications: Major Depressive Disorder, Disp: 30 tablet, Rfl: 2    No results found for this or any previous visit (from the past 24 weeks).      Review of Systems    Objective     /85 (BP Location: Left arm, Patient Position: Sitting, Cuff Size: Adult)   Pulse 81   Temp 97.6 °F (36.4 °C) (Oral)   Resp 18   Ht 162.2 cm (63.86\")   Wt (!) 137 kg (301 lb 12.8 oz)   SpO2 99%   BMI 52.03 kg/m²     Physical Exam  Pulmonary:      Effort: No accessory muscle usage or prolonged expiration.   Neurological:      Gait: Gait normal.   Psychiatric:         Attention and Perception: She is inattentive.         Mood and Affect: Affect is flat.         Speech: Speech normal.         Behavior: Behavior is cooperative.         Result Review :                Assessment & Plan    Diagnoses and all orders for this visit:    1. Encounter for medication management (Primary)  Comments:  no refills will be ordered today. patient has been noncompliant with requested testing and referrals.      There are no Patient Instructions on file for this visit.    Follow Up   No follow-ups on file.    Patient was given instructions and counseling regarding her condition or for health maintenance advice. Please see specific information pulled into the AVS if appropriate.     Soledad Abreu, APRN    11/25/24      "

## 2025-01-09 ENCOUNTER — TELEPHONE (OUTPATIENT)
Dept: FAMILY MEDICINE CLINIC | Facility: CLINIC | Age: 55
End: 2025-01-09
Payer: MEDICAID

## 2025-01-09 NOTE — TELEPHONE ENCOUNTER
Hub ok to relay   Left voicemail for patient to complete labs work that [facility] ordered then call office for resutls.

## 2025-02-14 ENCOUNTER — OFFICE VISIT (OUTPATIENT)
Dept: FAMILY MEDICINE CLINIC | Facility: CLINIC | Age: 55
End: 2025-02-14
Payer: MEDICAID

## 2025-02-14 ENCOUNTER — LAB (OUTPATIENT)
Dept: FAMILY MEDICINE CLINIC | Facility: CLINIC | Age: 55
End: 2025-02-14
Payer: MEDICAID

## 2025-02-14 VITALS
HEART RATE: 76 BPM | WEIGHT: 293 LBS | OXYGEN SATURATION: 99 % | BODY MASS INDEX: 48.82 KG/M2 | SYSTOLIC BLOOD PRESSURE: 132 MMHG | TEMPERATURE: 96.4 F | RESPIRATION RATE: 16 BRPM | DIASTOLIC BLOOD PRESSURE: 76 MMHG | HEIGHT: 65 IN

## 2025-02-14 DIAGNOSIS — F99 MENTAL HEALTH DISORDER: ICD-10-CM

## 2025-02-14 DIAGNOSIS — H92.03 EAR PAIN, BILATERAL: ICD-10-CM

## 2025-02-14 DIAGNOSIS — Z87.891 PERSONAL HISTORY OF TOBACCO USE, PRESENTING HAZARDS TO HEALTH: ICD-10-CM

## 2025-02-14 DIAGNOSIS — I10 ESSENTIAL HYPERTENSION: Chronic | ICD-10-CM

## 2025-02-14 DIAGNOSIS — Z00.00 HEALTHCARE MAINTENANCE: ICD-10-CM

## 2025-02-14 DIAGNOSIS — M54.42 CHRONIC BILATERAL LOW BACK PAIN WITH BILATERAL SCIATICA: ICD-10-CM

## 2025-02-14 DIAGNOSIS — K21.9 GERD WITHOUT ESOPHAGITIS: Chronic | ICD-10-CM

## 2025-02-14 DIAGNOSIS — F31.9 BIPOLAR 1 DISORDER: ICD-10-CM

## 2025-02-14 DIAGNOSIS — Z79.4 TYPE 2 DIABETES MELLITUS WITH HYPERGLYCEMIA, WITH LONG-TERM CURRENT USE OF INSULIN: ICD-10-CM

## 2025-02-14 DIAGNOSIS — R56.9 SEIZURES: ICD-10-CM

## 2025-02-14 DIAGNOSIS — G89.29 CHRONIC BILATERAL LOW BACK PAIN WITH BILATERAL SCIATICA: ICD-10-CM

## 2025-02-14 DIAGNOSIS — J44.9 CHRONIC OBSTRUCTIVE PULMONARY DISEASE, UNSPECIFIED COPD TYPE: Primary | ICD-10-CM

## 2025-02-14 DIAGNOSIS — F41.9 ANXIETY: ICD-10-CM

## 2025-02-14 DIAGNOSIS — M54.41 CHRONIC BILATERAL LOW BACK PAIN WITH BILATERAL SCIATICA: ICD-10-CM

## 2025-02-14 DIAGNOSIS — E11.65 TYPE 2 DIABETES MELLITUS WITH HYPERGLYCEMIA, WITH LONG-TERM CURRENT USE OF INSULIN: ICD-10-CM

## 2025-02-14 PROBLEM — M54.89 OTHER DORSALGIA: Status: ACTIVE | Noted: 2018-02-27

## 2025-02-14 PROBLEM — D75.1 POLYCYTHEMIA: Status: ACTIVE | Noted: 2018-02-27

## 2025-02-14 PROBLEM — Z13.9 ENCOUNTER FOR SCREENING: Status: ACTIVE | Noted: 2018-02-27

## 2025-02-14 LAB
ALBUMIN SERPL-MCNC: 3.9 G/DL (ref 3.5–5.2)
ALBUMIN/GLOB SERPL: 1.2 G/DL
ALP SERPL-CCNC: 72 U/L (ref 39–117)
ALT SERPL W P-5'-P-CCNC: 12 U/L (ref 1–33)
ANION GAP SERPL CALCULATED.3IONS-SCNC: 13.3 MMOL/L (ref 5–15)
AST SERPL-CCNC: 23 U/L (ref 1–32)
BASOPHILS # BLD AUTO: 0.09 10*3/MM3 (ref 0–0.2)
BASOPHILS NFR BLD AUTO: 0.8 % (ref 0–1.5)
BILIRUB SERPL-MCNC: 0.2 MG/DL (ref 0–1.2)
BUN SERPL-MCNC: 14 MG/DL (ref 6–20)
BUN/CREAT SERPL: 15.6 (ref 7–25)
CALCIUM SPEC-SCNC: 9.7 MG/DL (ref 8.6–10.5)
CHLORIDE SERPL-SCNC: 100 MMOL/L (ref 98–107)
CHOLEST SERPL-MCNC: 203 MG/DL (ref 0–200)
CO2 SERPL-SCNC: 22.7 MMOL/L (ref 22–29)
CREAT SERPL-MCNC: 0.9 MG/DL (ref 0.57–1)
DEPRECATED RDW RBC AUTO: 44 FL (ref 37–54)
EGFRCR SERPLBLD CKD-EPI 2021: 75.2 ML/MIN/1.73
EOSINOPHIL # BLD AUTO: 0.35 10*3/MM3 (ref 0–0.4)
EOSINOPHIL NFR BLD AUTO: 3 % (ref 0.3–6.2)
ERYTHROCYTE [DISTWIDTH] IN BLOOD BY AUTOMATED COUNT: 13.1 % (ref 12.3–15.4)
GLOBULIN UR ELPH-MCNC: 3.2 GM/DL
GLUCOSE SERPL-MCNC: 195 MG/DL (ref 65–99)
HBA1C MFR BLD: 8.06 % (ref 4.8–5.6)
HCT VFR BLD AUTO: 42.7 % (ref 34–46.6)
HCV AB SER QL: NORMAL
HDLC SERPL-MCNC: 29 MG/DL (ref 40–60)
HGB BLD-MCNC: 13.2 G/DL (ref 12–15.9)
IMM GRANULOCYTES # BLD AUTO: 0.03 10*3/MM3 (ref 0–0.05)
IMM GRANULOCYTES NFR BLD AUTO: 0.3 % (ref 0–0.5)
LDLC SERPL CALC-MCNC: 125 MG/DL (ref 0–100)
LDLC/HDLC SERPL: 4.1 {RATIO}
LYMPHOCYTES # BLD AUTO: 3.33 10*3/MM3 (ref 0.7–3.1)
LYMPHOCYTES NFR BLD AUTO: 29 % (ref 19.6–45.3)
MCH RBC QN AUTO: 28.1 PG (ref 26.6–33)
MCHC RBC AUTO-ENTMCNC: 30.9 G/DL (ref 31.5–35.7)
MCV RBC AUTO: 90.9 FL (ref 79–97)
MONOCYTES # BLD AUTO: 0.62 10*3/MM3 (ref 0.1–0.9)
MONOCYTES NFR BLD AUTO: 5.4 % (ref 5–12)
NEUTROPHILS NFR BLD AUTO: 61.5 % (ref 42.7–76)
NEUTROPHILS NFR BLD AUTO: 7.07 10*3/MM3 (ref 1.7–7)
NRBC BLD AUTO-RTO: 0 /100 WBC (ref 0–0.2)
PLATELET # BLD AUTO: 203 10*3/MM3 (ref 140–450)
PMV BLD AUTO: 12.5 FL (ref 6–12)
POTASSIUM SERPL-SCNC: 4.4 MMOL/L (ref 3.5–5.2)
PROT SERPL-MCNC: 7.1 G/DL (ref 6–8.5)
RBC # BLD AUTO: 4.7 10*6/MM3 (ref 3.77–5.28)
SODIUM SERPL-SCNC: 136 MMOL/L (ref 136–145)
TRIGL SERPL-MCNC: 275 MG/DL (ref 0–150)
TSH SERPL DL<=0.05 MIU/L-ACNC: 1.13 UIU/ML (ref 0.27–4.2)
VLDLC SERPL-MCNC: 49 MG/DL (ref 5–40)
WBC NRBC COR # BLD AUTO: 11.49 10*3/MM3 (ref 3.4–10.8)

## 2025-02-14 PROCEDURE — 80307 DRUG TEST PRSMV CHEM ANLYZR: CPT | Performed by: NURSE PRACTITIONER

## 2025-02-14 PROCEDURE — 81001 URINALYSIS AUTO W/SCOPE: CPT | Performed by: NURSE PRACTITIONER

## 2025-02-14 PROCEDURE — 87086 URINE CULTURE/COLONY COUNT: CPT | Performed by: NURSE PRACTITIONER

## 2025-02-14 PROCEDURE — 36415 COLL VENOUS BLD VENIPUNCTURE: CPT

## 2025-02-14 PROCEDURE — 80061 LIPID PANEL: CPT | Performed by: NURSE PRACTITIONER

## 2025-02-14 PROCEDURE — 86803 HEPATITIS C AB TEST: CPT | Performed by: NURSE PRACTITIONER

## 2025-02-14 PROCEDURE — 82043 UR ALBUMIN QUANTITATIVE: CPT | Performed by: NURSE PRACTITIONER

## 2025-02-14 PROCEDURE — 80050 GENERAL HEALTH PANEL: CPT | Performed by: NURSE PRACTITIONER

## 2025-02-14 PROCEDURE — 83036 HEMOGLOBIN GLYCOSYLATED A1C: CPT | Performed by: NURSE PRACTITIONER

## 2025-02-14 RX ORDER — HYDROXYZINE HYDROCHLORIDE 25 MG/1
TABLET, FILM COATED ORAL
Qty: 60 TABLET | Refills: 2 | Status: SHIPPED | OUTPATIENT
Start: 2025-02-14

## 2025-02-14 RX ORDER — DOXEPIN 6 MG/1
6 TABLET, FILM COATED ORAL DAILY
Qty: 30 TABLET | Refills: 0 | Status: SHIPPED | OUTPATIENT
Start: 2025-02-14

## 2025-02-14 RX ORDER — FAMOTIDINE 20 MG/1
20 TABLET, FILM COATED ORAL 2 TIMES DAILY
Qty: 60 TABLET | Refills: 1 | Status: SHIPPED | OUTPATIENT
Start: 2025-02-14

## 2025-02-14 RX ORDER — HYDROCHLOROTHIAZIDE 25 MG/1
25 TABLET ORAL DAILY
Qty: 180 TABLET | Refills: 0 | Status: SHIPPED | OUTPATIENT
Start: 2025-02-14 | End: 2025-08-13

## 2025-02-14 RX ORDER — DOCUSATE SODIUM 100 MG/1
100 CAPSULE, LIQUID FILLED ORAL 2 TIMES DAILY PRN
Qty: 30 CAPSULE | Refills: 0 | Status: SHIPPED | OUTPATIENT
Start: 2025-02-14 | End: 2025-03-16

## 2025-02-14 RX ORDER — GLUCAGON INJECTION, SOLUTION 1 MG/.2ML
1 INJECTION, SOLUTION SUBCUTANEOUS ONCE AS NEEDED
Qty: 0.4 ML | Refills: 1 | Status: SHIPPED | OUTPATIENT
Start: 2025-02-14

## 2025-02-14 RX ORDER — INSULIN LISPRO 100 U/ML
11 INJECTION, SOLUTION SUBCUTANEOUS
Qty: 15 ML | Refills: 1 | Status: SHIPPED | OUTPATIENT
Start: 2025-02-14

## 2025-02-14 RX ORDER — ALBUTEROL SULFATE 0.83 MG/ML
2.5 SOLUTION RESPIRATORY (INHALATION) EVERY 4 HOURS PRN
Qty: 20 EACH | Refills: 0 | Status: SHIPPED | OUTPATIENT
Start: 2025-02-14 | End: 2025-05-15

## 2025-02-14 RX ORDER — FLUTICASONE PROPIONATE AND SALMETEROL XINAFOATE 45; 21 UG/1; UG/1
2 AEROSOL, METERED RESPIRATORY (INHALATION)
Qty: 12 G | Refills: 0 | Status: SHIPPED | OUTPATIENT
Start: 2025-02-14

## 2025-02-14 RX ORDER — QUETIAPINE FUMARATE 200 MG/1
200 TABLET, FILM COATED ORAL
Qty: 30 TABLET | Refills: 3 | Status: SHIPPED | OUTPATIENT
Start: 2025-02-14

## 2025-02-14 RX ORDER — NEBIVOLOL 10 MG/1
10 TABLET ORAL DAILY
Qty: 90 TABLET | Refills: 1 | Status: SHIPPED | OUTPATIENT
Start: 2025-02-14

## 2025-02-14 RX ORDER — QUETIAPINE FUMARATE 25 MG/1
25 TABLET, FILM COATED ORAL NIGHTLY
Qty: 30 TABLET | Refills: 2 | Status: SHIPPED | OUTPATIENT
Start: 2025-02-14

## 2025-02-15 LAB
ALBUMIN UR-MCNC: 4.6 MG/DL
AMPHET+METHAMPHET UR QL: NEGATIVE
AMPHETAMINES UR QL: NEGATIVE
BACTERIA UR QL AUTO: ABNORMAL /HPF
BARBITURATES UR QL SCN: NEGATIVE
BENZODIAZ UR QL SCN: POSITIVE
BILIRUB UR QL STRIP: NEGATIVE
BUPRENORPHINE SERPL-MCNC: NEGATIVE NG/ML
CANNABINOIDS SERPL QL: POSITIVE
CLARITY UR: ABNORMAL
COCAINE UR QL: NEGATIVE
COLOR UR: ABNORMAL
FENTANYL UR-MCNC: NEGATIVE NG/ML
GLUCOSE UR STRIP-MCNC: NEGATIVE MG/DL
HGB UR QL STRIP.AUTO: NEGATIVE
HOLD SPECIMEN: NORMAL
HYALINE CASTS UR QL AUTO: ABNORMAL /LPF
KETONES UR QL STRIP: ABNORMAL
LEUKOCYTE ESTERASE UR QL STRIP.AUTO: ABNORMAL
METHADONE UR QL SCN: NEGATIVE
NITRITE UR QL STRIP: NEGATIVE
OPIATES UR QL: NEGATIVE
OXYCODONE UR QL SCN: POSITIVE
PCP UR QL SCN: NEGATIVE
PH UR STRIP.AUTO: 6 [PH] (ref 5–8)
PROT UR QL STRIP: ABNORMAL
RBC # UR STRIP: ABNORMAL /HPF
REF LAB TEST METHOD: ABNORMAL
SP GR UR STRIP: 1.03 (ref 1–1.03)
SQUAMOUS #/AREA URNS HPF: ABNORMAL /HPF
TRICYCLICS UR QL SCN: NEGATIVE
UROBILINOGEN UR QL STRIP: ABNORMAL
WBC # UR STRIP: ABNORMAL /HPF

## 2025-02-16 PROBLEM — H92.03 EAR PAIN, BILATERAL: Status: ACTIVE | Noted: 2025-02-16

## 2025-02-16 PROBLEM — Z87.891 PERSONAL HISTORY OF TOBACCO USE, PRESENTING HAZARDS TO HEALTH: Status: ACTIVE | Noted: 2025-02-16

## 2025-02-16 PROBLEM — Z00.00 HEALTHCARE MAINTENANCE: Status: ACTIVE | Noted: 2018-02-27

## 2025-02-16 PROBLEM — F31.9 BIPOLAR 1 DISORDER: Status: ACTIVE | Noted: 2025-02-16

## 2025-02-16 PROBLEM — F99 MENTAL HEALTH DISORDER: Status: ACTIVE | Noted: 2025-02-16

## 2025-02-16 LAB — BACTERIA SPEC AEROBE CULT: NO GROWTH

## 2025-02-16 NOTE — PROGRESS NOTES
-Blood counts with no anemia, no infection, and normal clotting cells.   -Electrolytes normal, blood sugar elevated and most likely uncontrolled, liver and kidney function normal.   -+ Uncontrolled diabetes.   -Thyroid screening is normal.   -Cholesterol is elevated. + HLD.  The 10-year ASCVD risk score (Merry CR, et al., 2019) is: 23.5%    Values used to calculate the score:      Age: 54 years      Sex: Female      Is Non- : No      Diabetic: Yes      Tobacco smoker: Yes      Systolic Blood Pressure: 132 mmHg      Is BP treated: Yes      HDL Cholesterol: 29 mg/dL      Total Cholesterol: 203 mg/dL ASCVD risk calculator was used to calculate your 10-year risk of a cardiovascular event (such as a heart attack or stroke) and it was 23.5%  -Urine is ABnormal - most likely contaminant due to + Squam epithelials.

## 2025-02-16 NOTE — ASSESSMENT & PLAN NOTE
No effusion or acute otitis media detected on physical exam.  Advised patient to take over the counter pseudophed, as directed.  Patient denies congestion, fever or cough.  Patient is 1 pack per day cig smoker.    Tobacco Use: High Risk (2/14/2025)    Patient History     Smoking Tobacco Use: Every Day     Smokeless Tobacco Use: Never     Passive Exposure: Current    Smoking cessation discussed briefly.

## 2025-02-16 NOTE — PROGRESS NOTES
UDS = + THC, benzos and opioids.  INSPECT reveals NO controlled substances prescribed since 6/27/2023 - x21 Tramadol.    Hgb A1C is elevated at 8.06.  Improved from previous.

## 2025-02-16 NOTE — PROGRESS NOTES
"Chief Complaint  Establish Care and Earache (Bilateral ear pain x 1 week)    Subjective        Serina Powell presents to [facility]  History of Present Illness  53 y/o morbidly obese disheveled appearing  female presents to  to obtain \"need refills of all meds\".  Patient reports established with previous provider in  PCP in [place], but \"I didn't get along with her\", so she presents today to re-establish.   Earache   There is pain in both ears. This is a new problem. The current episode started in the past 7 days. The problem occurs constantly. The problem has been unchanged. There has been no fever. The pain is mild. Pertinent negatives include no coughing, drainage, headaches or hearing loss. She has tried nothing for the symptoms. The treatment provided no relief.   Additional information:Patient reports smoking 1 pack of cigarettes per day.      Objective   Vital Signs:  /76 (BP Location: Other (Comment), Patient Position: Sitting, Cuff Size: Large Adult) Comment (BP Location): L. forearm  Pulse 76   Temp 96.4 °F (35.8 °C) (Infrared)   Resp 16   Ht 165.7 cm (65.25\")   Wt 135 kg (297 lb 14.4 oz)   SpO2 99%   BMI 49.19 kg/m²   Estimated body mass index is 49.19 kg/m² as calculated from the following:    Height as of this encounter: 165.7 cm (65.25\").    Weight as of this encounter: 135 kg (297 lb 14.4 oz).            Physical Exam  Constitutional:       General: She is not in acute distress.     Appearance: She is obese. She is ill-appearing. She is not toxic-appearing or diaphoretic.   HENT:      Head: Normocephalic and atraumatic.      Right Ear: Tympanic membrane, ear canal and external ear normal. There is no impacted cerumen.      Left Ear: Tympanic membrane, ear canal and external ear normal. There is no impacted cerumen.      Nose: Nose normal. No congestion or rhinorrhea.      Mouth/Throat:      Mouth: Mucous membranes are moist.      " Pharynx: Oropharynx is clear. No oropharyngeal exudate or posterior oropharyngeal erythema.   Eyes:      Extraocular Movements: Extraocular movements intact.      Conjunctiva/sclera: Conjunctivae normal.      Pupils: Pupils are equal, round, and reactive to light.   Cardiovascular:      Rate and Rhythm: Normal rate and regular rhythm.      Pulses: Normal pulses.      Heart sounds: Normal heart sounds.   Pulmonary:      Effort: Pulmonary effort is normal.      Breath sounds: Rhonchi present.   Abdominal:      Palpations: Abdomen is soft.      Tenderness: There is no abdominal tenderness. There is no guarding or rebound.   Skin:     General: Skin is warm and dry.      Capillary Refill: Capillary refill takes 2 to 3 seconds.   Neurological:      General: No focal deficit present.      Mental Status: She is alert and oriented to person, place, and time.      Gait: Gait normal.   Psychiatric:         Attention and Perception: Attention normal.         Mood and Affect: Mood is depressed. Affect is blunt and flat. Affect is not angry, tearful or inappropriate.         Speech: Speech normal.         Behavior: Behavior is slowed and withdrawn. Behavior is cooperative.         Thought Content: Thought content is not paranoid or delusional.         Cognition and Memory: Cognition and memory normal.         Judgment: Judgment normal.        Result Review :         Assessment and Plan   Diagnoses and all orders for this visit:    1. Chronic obstructive pulmonary disease, unspecified COPD type (Primary)  -     albuterol (PROVENTIL) (2.5 MG/3ML) 0.083% nebulizer solution; Take 2.5 mg by nebulization Every 4 (Four) Hours As Needed for Wheezing for up to 90 days. Indications: Spasm of Lung Air Passages  Dispense: 20 each; Refill: 0  -     docusate sodium (COLACE) 100 MG capsule; Take 1 capsule by mouth 2 (Two) Times a Day As Needed for Constipation for up to 30 days. Indications: Constipation  Dispense: 30 capsule; Refill: 0  -      fluticasone-salmeterol (ADVAIR HFA) 45-21 MCG/ACT inhaler; Inhale 2 puffs 2 (Two) Times a Day. Indications: Asthma  Dispense: 12 g; Refill: 0  -     Ambulatory Referral to Pulmonology  -     Complete PFT - Pre & Post Bronchodilator; Future    2. Healthcare maintenance  -     CBC & Differential  -     Comprehensive Metabolic Panel  -     Hemoglobin A1c; Future  -     Urinalysis With Culture If Indicated - Urine, Clean Catch  -     TSH Rfx On Abnormal To Free T4  -     Ambulatory Referral to Pulmonology  -     Complete PFT - Pre & Post Bronchodilator; Future  -     Mammo Screening Digital Tomosynthesis Bilateral With CAD; Future  -     Hepatitis C antibody; Future  -     Lipid Panel  -     Ambulatory Referral For Screening Colonoscopy  -      CT Chest Low Dose Cancer Screening WO; Future  -     Ambulatory Referral to Gynecology  -     MicroAlbumin, Urine, Random - Urine, Clean Catch; Future  -     Urine Drug Screen - Urine, Clean Catch; Future  -     [place] Urine Culture Tube - Urine, Clean Catch  -     Urinalysis, Microscopic Only - Urine, Clean Catch  -     Urine Culture - Urine, Urine, Clean Catch  -     Ambulatory Referral to Social Care Services (Amb Case Mgmt)  -     Ambulatory Referral to Case Management Disease Education, Medication Adherence, Preventative Care, Barriers to Care    3. Anxiety  -     Doxepin HCl 6 MG tablet; Take 1 tablet by mouth Daily.  Dispense: 30 tablet; Refill: 0    4. Mental health disorder  -     Ambulatory Referral to Psychiatry  -     Ambulatory Referral to Social Care Services (Amb Case Mgmt)  -     Ambulatory Referral to Case Management Disease Education, Medication Adherence, Preventative Care, Barriers to Care    5. GERD without esophagitis  -     famotidine (PEPCID) 20 MG tablet; Take 1 tablet by mouth 2 (Two) Times a Day.  Dispense: 60 tablet; Refill: 1    6. Essential hypertension  -     hydroCHLOROthiazide 25 MG tablet; Take 1 tablet by mouth Daily for 180 days. Indications:  Edema  Dispense: 180 tablet; Refill: 0  -     nebivolol (BYSTOLIC) 10 MG tablet; Take 1 tablet by mouth Daily. Indications: High Blood Pressure  Dispense: 90 tablet; Refill: 1    7. Bipolar 1 disorder  -     hydrOXYzine (ATARAX) 25 MG tablet; Take one tablet my mouth twice a day as needed for anxiety  Indications: Feeling Anxious  Dispense: 60 tablet; Refill: 2  -     QUEtiapine (SEROquel) 200 MG tablet; Take 1 tablet by mouth every night at bedtime.  Dispense: 30 tablet; Refill: 3  -     QUEtiapine (SEROquel) 25 MG tablet; Take 1 tablet by mouth Every Night. Indications: Major Depressive Disorder  Dispense: 30 tablet; Refill: 2    8. Chronic bilateral low back pain with bilateral sciatica    9. Type 2 diabetes mellitus with hyperglycemia, with long-term current use of insulin  -     Insulin Glargine (LANTUS SOLOSTAR) 100 UNIT/ML injection pen; Inject 20 Units under the skin into the appropriate area as directed Every Night.  Dispense: 15 mL; Refill: 1  -     Insulin Lispro (ADMELOG SOLOSTAR) 100 UNIT/ML injection pen; Inject 11 Units under the skin into the appropriate area as directed 3 (Three) Times a Day With Meals. Indications: Type 2 Diabetes  Dispense: 15 mL; Refill: 1  -     metFORMIN (GLUCOPHAGE) 1000 MG tablet; Take 1 tablet by mouth Every 12 (Twelve) Hours. Indications: Type 2 Diabetes  Dispense: 180 tablet; Refill: 1  -     Glucagon (Gvoke HypoPen 2-Pack) 1 MG/0.2ML solution auto-injector; Inject 1 mg under the skin into the appropriate area as directed 1 (One) Time As Needed (hypoglycemia) for up to 1 dose.  Dispense: 0.4 mL; Refill: 1    10. Seizures  -     QUEtiapine (SEROquel) 200 MG tablet; Take 1 tablet by mouth every night at bedtime.  Dispense: 30 tablet; Refill: 3  -     QUEtiapine (SEROquel) 25 MG tablet; Take 1 tablet by mouth Every Night. Indications: Major Depressive Disorder  Dispense: 30 tablet; Refill: 2    11. Personal history of tobacco use, presenting hazards to health    12. Ear pain,  bilateral  Assessment & Plan:  No effusion or acute otitis media detected on physical exam.  Advised patient to take over the counter pseudophed, as directed.  Patient denies congestion, fever or cough.  Patient is 1 pack per day cig smoker.    Tobacco Use: High Risk (2/14/2025)    Patient History     Smoking Tobacco Use: Every Day     Smokeless Tobacco Use: Never     Passive Exposure: Current    Smoking cessation discussed briefly.             I spent 40 minutes caring for Serina on this date of service. This time includes time spent by me in the following activities:preparing for the visit, reviewing tests, obtaining and/or reviewing a separately obtained history, performing a medically appropriate examination and/or evaluation , counseling and educating the patient/family/caregiver, ordering medications, tests, or procedures, referring and communicating with other health care professionals , documenting information in the medical record, and care coordination  Follow Up   Return in about 4 weeks (around 3/14/2025).  Patient was given instructions and counseling regarding her condition or for health maintenance advice. Please see specific information pulled into the AVS if appropriate.

## 2025-02-17 ENCOUNTER — REFERRAL TRIAGE (OUTPATIENT)
Dept: CASE MANAGEMENT | Facility: OTHER | Age: 55
End: 2025-02-17
Payer: MEDICAID

## 2025-02-17 ENCOUNTER — REFERRAL TRIAGE (OUTPATIENT)
Age: 55
End: 2025-02-17
Payer: MEDICAID

## 2025-02-21 ENCOUNTER — PATIENT OUTREACH (OUTPATIENT)
Age: 55
End: 2025-02-21
Payer: MEDICAID

## 2025-02-21 NOTE — OUTREACH NOTE
SW received referral via PCP re: need for community resources. SW attempted to reach patient. No Answer. LVM. Next outreach scheduled x 2 days.     Alesia PAUL -   Ambulatory Case Management    2/21/2025, 12:46 EST

## 2025-02-24 ENCOUNTER — PATIENT OUTREACH (OUTPATIENT)
Dept: CASE MANAGEMENT | Facility: OTHER | Age: 55
End: 2025-02-24
Payer: MEDICAID

## 2025-02-24 ENCOUNTER — PATIENT OUTREACH (OUTPATIENT)
Age: 55
End: 2025-02-24
Payer: MEDICAID

## 2025-02-24 NOTE — OUTREACH NOTE
AMBULATORY CASE MANAGEMENT NOTE    Names and Relationships of Patient/Support Persons: Contact: OcontoHernandeza JUAN; Relationship: Self -     Patient Outreach    Received provider referral for pt, dx health maintenance. RN-ACM outreach call made to pt, able to reach pt on 3rd attempt. Explained role of RN-ACM and reason for call. Assessment completed. Pt denies any symptoms at this time.     Discussed most recent PCP visit. Reviewed referrals and tests ordered. Pt requests RN-ACM schedule her mammogram, PFT, and chest CT screening. Pt reports she will call and follow up on GI referral/colonoscopy, psychiatry appt, pulmonology appt. Their contact information provided. CR notes state psychiatry office has attempted to contact pt and left VM messages. RN-ACM advised pt to check her VM, RN-ACM available to assist with appts if needed. CR notes indicate gynecology referral was denied d/t office not accepting medicaid pt's at this time, RN-ACM will notify pt's PCP office.     Pt reports she was able to get rx's filled at last PCP appt with exception of inhaler, nebulizer med, and both insulins. Pt states she plans to reach out to pharmacy to inquire about these medications, RN-ACM encouraged pt to do so, offered assistance if needed. Disease education provided. Pt reports to be compliant with BP and BG monitoring. Smoking cessation resources offered, pt declines at this time.     Reviewed SDOH. Pt interested in food resources. RN-ACM will send message to Anaya HENRY who has been trying to reach pt. RN-ACM advised pt to watch for her call.     Care Coordination    Epic secure chat message sent to Anaya Dumas, requesting SW follow up with pt for food resources.     Care Coordination    RN-ACM outreach note routed to pt's PCP office informing office staff that pt's gyn referral was denied.     Care Coordination    Called [facility] scheduling dept to schedule mammogram, PFT, and chest CT screening, spoke with staff Teresita. All testing  scheduled for 3/18/25. Tests to begin at 2:45 pm, pt to arrive at 2:15 pm. No prep for CT. Mammo prep- no deodorant, lotion, or perfume. PFT prep- 4 hours prior no smoking, etoh, caffeine, or rescue breathing medication; no vigorous exercise 30 min prior, and pt to wear comfy clothing that doesn't restrict chest movement. RN-ACM will notify pt.      Patient Outreach    Called pt, appt information and instructions provided. Advised pt to call with any needs. Follow up outreach scheduled.    Adult Patient Profile  Questions/Answers      Flowsheet Row Most Recent Value   Symptoms/Conditions Managed at Home diabetes, type 2, respiratory   Diabetes Management Strategies activity, blood glucose testing, diet modification, insulin therapy, medication therapy   Respiratory Symptoms/Conditions COPD   Respiratory Management Strategies breathing exercise, nebulizer therapy, medication therapy   Equipment Currently Used at Home bp cuff, glucometer   Primary Source of Support/Comfort child(satnam), parent   People in Home child(satnam), adult, grandchild(satnam)   Current Living Arrangements apartment          Send Education  Questions/Answers      Flowsheet Row Most Recent Value   Other Patient Education/Resources  24/7 [facility] Nurse Call Line   24/7 Nurse Call Line Education Method Verbal          SDOH updated and reviewed with the patient during this program:  Questionnaire sent via [employer]  Benefits, taught by Nica Pruitt RN at 2/24/2025 12:49 PM.  Learner: Patient  Readiness: Acceptance  Method: Explanation  Response: Verbalizes Understanding    Follow-Up Care, taught by Nica Pruitt RN at 2/24/2025 12:49 PM.  Learner: Patient  Readiness: Acceptance  Method: Explanation  Response: Verbalizes Understanding    Healthy Food Choices, taught by Nica Pruitt RN at 2/24/2025 12:49 PM.  Learner: Patient  Readiness: Acceptance  Method: Explanation  Response: Verbalizes Understanding    Oral  Medication, taught by Nica Pruitt RN at 2/24/2025 12:49 PM.  Learner: Patient  Readiness: Acceptance  Method: Explanation  Response: Verbalizes Understanding    Insulin Therapy, taught by Nica Pruitt RN at 2/24/2025 12:49 PM.  Learner: Patient  Readiness: Acceptance  Method: Explanation  Response: Verbalizes Understanding    Blood Glucose Monitoring, taught by Nica Pruitt RN at 2/24/2025 12:49 PM.  Learner: Patient  Readiness: Acceptance  Method: Explanation  Response: Verbalizes Understanding    Pulmonary Hygiene, taught by Nica Pruitt RN at 2/24/2025 12:49 PM.  Learner: Patient  Readiness: Acceptance  Method: Explanation  Response: Verbalizes Understanding    Provider Follow-Up, taught by Nica Pruitt RN at 2/24/2025 12:49 PM.  Learner: Patient  Readiness: Acceptance  Method: Explanation  Response: Verbalizes Understanding    Energy Conservation, taught by Nica Pruitt RN at 2/24/2025 12:49 PM.  Learner: Patient  Readiness: Acceptance  Method: Explanation  Response: Verbalizes Understanding    tobacco use/smoke exposure, taught by Nica Pruitt RN at 2/24/2025 12:49 PM.  Learner: Patient  Readiness: Acceptance  Method: Explanation  Response: Verbalizes Understanding    medication management, taught by Nica Pruitt RN at 2/24/2025 12:49 PM.  Learner: Patient  Readiness: Acceptance  Method: Explanation  Response: Verbalizes Understanding    activity, taught by Nica Pruitt RN at 2/24/2025 12:49 PM.  Learner: Patient  Readiness: Acceptance  Method: Explanation  Response: Verbalizes Understanding          Nica NARAYANAN  Ambulatory Case Management    2/24/2025, 12:27 EST

## 2025-02-24 NOTE — PLAN OF CARE
Problem: Wellness  Goal: Stop or Cut Down Tobacco Use  Outcome: Not Progressing  Intervention: My Tobacco Use Management To Do List  Description:   Why is this important?  To stop or cut down it is important to have support from a person or group of people who you can count on.  You will also need to think about the things that make you feel like smoking, then plan for how to handle them.    Flowsheets (Taken 2/24/2025 1245)  My Tobacco Use Management To Do List: (utilize smoking resources when ready) --     Problem: Wellness  Goal: Become More Active  Outcome: Progressing  Intervention: My Activity To Do List  Description:   Why is this important?  It is easy to come up with reasons not to exercise.  These steps will help you get started and have fun doing it.    Flowsheets (Taken 2/24/2025 1245)  My Activity To Do List: (continue to stay active) --  Goal: Eat Healthy  Outcome: Progressing  Intervention: My Healthy Eating To Do List  Description:   Why is this important?  When you are ready to manage your nutrition or weight, having a plan and setting goals will help.  Taking small steps to change how you eat and exercise is a good place to start.    Flowsheets (Taken 2/24/2025 1245)  My Healthy Eating To Do List: set a realistic goal     Problem: Wellness  Goal: Optimal Wellbeing  Intervention: Identify and Optimize Mental Processes  Flowsheets (Taken 2/24/2025 1245)  Identify and Optimize Mental Processes: regular activity or exercise promoted  Intervention: Alleviate Barriers to Increasing Activity Level  Flowsheets (Taken 2/24/2025 1245)  Alleviate Barriers to Increasing Activity Level: support and encouragement provided  Intervention: Alleviate Barriers to Healthy Eating  Flowsheets (Taken 2/24/2025 1245)  Alleviate Barriers to Healthy Eating: making healthy food choices encouraged  Intervention: Promote Smoking Cessation  Flowsheets (Taken 2/24/2025 1245)  Promote Smoking Cessation:   brief intervention  provided   smoking counseling provided

## 2025-02-24 NOTE — OUTREACH NOTE
Social Work Assessment  Questions/Answers      Flowsheet Row Most Recent Value   Referral Source outpatient staff, outpatient clinic, physician   Reason for Consult community resources   Preferred Language English   Advance Care Planning Reviewed no concerns identified   People in Home grandchild(satnam), child(satnam), adult   Current Living Arrangements apartment   Potentially Unsafe Housing Conditions none   In the past 12 months has the electric, gas, oil, or water company threatened to shut off services in your home? Yes   Primary Care Provided by self   Provides Primary Care For no one   Family Caregiver if Needed child(satnam), adult   Able to Return to Prior Arrangements no   Employment Status disabled   Source of Income disability, social security, public assistance   Financial/Environmental Concerns none   Medications independent   Meal Preparation independent   Housekeeping independent   Laundry independent   Shopping independent   If for any reason you need help with day-to-day activities such as bathing, preparing meals, shopping, managing finances, etc., do you get the help you need? I get all the help I need          SDOH updated and reviewed with the patient during this program:  Financial Resource Strain: Medium Risk (2/24/2025)    Overall Financial Resource Strain (CARDIA)     Difficulty of Paying Living Expenses: Somewhat hard      --     Food Insecurity: No Food Insecurity (2/24/2025)    Hunger Vital Sign     Worried About Running Out of Food in the Last Year: Never true     Ran Out of Food in the Last Year: Never true      --     Housing Stability: Not At Risk (2/24/2025)    Housing Stability     Current Living Arrangements: apartment     Potentially Unsafe Housing Conditions: none      --     Transportation Needs: No Transportation Needs (2/24/2025)    PRAPARE - Transportation     Lack of Transportation (Medical): No     Lack of Transportation (Non-Medical): No      Patient Outreach    SW received  referral via PCP re: need for community resources. SW called and spoke to patient. Patient lives in a 2 bedroom apartment with her daughter and two grandchildren. Patient reports she has her own room. Reports they are not behind on any bills. Patient receives SSDI, SNAP, and has a vehicle registered in her name that patient and daughter both drive. Patient was agreeable for this SW to send community resources to patient e-mail address. No additional concerns noted. This SW to continue to monitor x 30 days to ensure no additional needs arise prior to DC.      Continuing Care   Community [facility] - [facility] [address]    [address] [place] [address]    Phone: [phone]    Request Status: Pending - No Request Sent    Services:     Resource for: Financial Resource Strain, Utilities   [facility]    [address] [facility] [place] [address]    Phone: [phone]    Request Status: Pending - No Request Sent    Services:     Resource for: Financial Resource Strain, Utilangel   [facility] - [address], [address] [place] [address]    Phone: [phone]    Request Status: Pending - No Request Sent    Services: Food Insecurity Services    Resource for: Food Insecurity   [facility] [address] [facility] [place] [address]    Phone: [phone]    Request Status: Pending - No Request Sent    Services: Financial Assistance, Food Insecurity Services    Resource for: Financial Resource Strain, Food Insecurity, Utilities   [facility]    [address] [facility] [place] [address]    Phone: [phone]    Request Status: Pending - No Request Sent    Services:     Resource for: Financial Resource Jose Utilities     Alesia PAUL -   Ambulatory Case Management    2/24/2025, 13:13 EST

## 2025-02-26 NOTE — PROGRESS NOTES
I SENT HER REFERRAL TO DR. JUAREZ'S OFFICES IN [facility]    PLEASE LET THE PATIENT KNOW THIS INFORMATION. THANK YOU

## 2025-03-24 ENCOUNTER — APPOINTMENT (OUTPATIENT)
Dept: GENERAL RADIOLOGY | Facility: HOSPITAL | Age: 55
End: 2025-03-24
Payer: MEDICAID

## 2025-03-24 ENCOUNTER — HOSPITAL ENCOUNTER (INPATIENT)
Facility: HOSPITAL | Age: 55
LOS: 4 days | Discharge: HOME | End: 2025-03-28
Attending: STUDENT IN AN ORGANIZED HEALTH CARE EDUCATION/TRAINING PROGRAM | Admitting: STUDENT IN AN ORGANIZED HEALTH CARE EDUCATION/TRAINING PROGRAM
Payer: MEDICAID

## 2025-03-24 ENCOUNTER — PATIENT OUTREACH (OUTPATIENT)
Age: 55
End: 2025-03-24
Payer: MEDICAID

## 2025-03-24 ENCOUNTER — APPOINTMENT (OUTPATIENT)
Dept: CT IMAGING | Facility: HOSPITAL | Age: 55
End: 2025-03-24
Payer: MEDICAID

## 2025-03-24 DIAGNOSIS — E83.42 HYPOMAGNESEMIA: ICD-10-CM

## 2025-03-24 DIAGNOSIS — R73.9 HYPERGLYCEMIA: ICD-10-CM

## 2025-03-24 DIAGNOSIS — A41.9 SEPSIS, DUE TO UNSPECIFIED ORGANISM, UNSPECIFIED WHETHER ACUTE ORGAN DYSFUNCTION PRESENT: ICD-10-CM

## 2025-03-24 DIAGNOSIS — B33.8 RSV INFECTION: ICD-10-CM

## 2025-03-24 DIAGNOSIS — R41.82 ALTERED MENTAL STATUS, UNSPECIFIED ALTERED MENTAL STATUS TYPE: Primary | ICD-10-CM

## 2025-03-24 DIAGNOSIS — E87.6 HYPOKALEMIA: ICD-10-CM

## 2025-03-24 DIAGNOSIS — R50.9 FEVER, UNSPECIFIED FEVER CAUSE: ICD-10-CM

## 2025-03-24 DIAGNOSIS — K80.20 CALCULUS OF GALLBLADDER WITHOUT CHOLECYSTITIS WITHOUT OBSTRUCTION: ICD-10-CM

## 2025-03-24 DIAGNOSIS — N17.9 AKI (ACUTE KIDNEY INJURY): ICD-10-CM

## 2025-03-24 LAB
ACETONE BLD QL: ABNORMAL
ALBUMIN SERPL-MCNC: 4 G/DL (ref 3.5–5.2)
ALBUMIN/GLOB SERPL: 1.1 G/DL
ALP SERPL-CCNC: 75 U/L (ref 39–117)
ALT SERPL W P-5'-P-CCNC: 18 U/L (ref 1–33)
AMPHET+METHAMPHET UR QL: NEGATIVE
AMPHETAMINES UR QL: NEGATIVE
ANION GAP SERPL CALCULATED.3IONS-SCNC: 18.1 MMOL/L (ref 5–15)
APAP SERPL-MCNC: <5 MCG/ML (ref 0–30)
APTT PPP: 27.3 SECONDS (ref 22.7–35.4)
ARTERIAL PATENCY WRIST A: POSITIVE
AST SERPL-CCNC: 65 U/L (ref 1–32)
ATMOSPHERIC PRESS: ABNORMAL MM[HG]
B PARAPERT DNA SPEC QL NAA+PROBE: NOT DETECTED
B PERT DNA SPEC QL NAA+PROBE: NOT DETECTED
BACTERIA UR QL AUTO: ABNORMAL /HPF
BARBITURATES UR QL SCN: NEGATIVE
BASE EXCESS BLDA CALC-SCNC: 11.9 MMOL/L (ref 0–3)
BASOPHILS # BLD AUTO: 0.03 10*3/MM3 (ref 0–0.2)
BASOPHILS NFR BLD AUTO: 0.2 % (ref 0–1.5)
BDY SITE: ABNORMAL
BENZODIAZ UR QL SCN: POSITIVE
BILIRUB SERPL-MCNC: 0.6 MG/DL (ref 0–1.2)
BILIRUB UR QL STRIP: NEGATIVE
BUN SERPL-MCNC: 22 MG/DL (ref 6–20)
BUN/CREAT SERPL: 12.7 (ref 7–25)
BUPRENORPHINE SERPL-MCNC: POSITIVE NG/ML
C PNEUM DNA NPH QL NAA+NON-PROBE: NOT DETECTED
CALCIUM SPEC-SCNC: 10 MG/DL (ref 8.6–10.5)
CANNABINOIDS SERPL QL: POSITIVE
CHLORIDE SERPL-SCNC: 89 MMOL/L (ref 98–107)
CLARITY UR: ABNORMAL
CO2 BLDA-SCNC: 33.2 MMOL/L (ref 22–29)
CO2 SERPL-SCNC: 29.9 MMOL/L (ref 22–29)
COCAINE UR QL: NEGATIVE
COLOR UR: YELLOW
CREAT SERPL-MCNC: 1.73 MG/DL (ref 0.57–1)
D-LACTATE SERPL-SCNC: 1.9 MMOL/L (ref 0.5–2)
D-LACTATE SERPL-SCNC: 2.5 MMOL/L (ref 0.5–2)
D-LACTATE SERPL-SCNC: 3.6 MMOL/L (ref 0.3–2)
D-LACTATE SERPL-SCNC: 4.8 MMOL/L (ref 0.5–2)
DEPRECATED RDW RBC AUTO: 38.8 FL (ref 37–54)
EGFRCR SERPLBLD CKD-EPI 2021: 34.3 ML/MIN/1.73
EOSINOPHIL # BLD AUTO: 0.01 10*3/MM3 (ref 0–0.4)
EOSINOPHIL NFR BLD AUTO: 0.1 % (ref 0.3–6.2)
ERYTHROCYTE [DISTWIDTH] IN BLOOD BY AUTOMATED COUNT: 12.7 % (ref 12.3–15.4)
ETHANOL UR QL: <0.01 %
FLUAV SUBTYP SPEC NAA+PROBE: NOT DETECTED
FLUBV RNA ISLT QL NAA+PROBE: NOT DETECTED
GEN 5 1HR TROPONIN T REFLEX: 57 NG/L
GLOBULIN UR ELPH-MCNC: 3.6 GM/DL
GLUCOSE BLDC GLUCOMTR-MCNC: 256 MG/DL (ref 70–105)
GLUCOSE BLDC GLUCOMTR-MCNC: 306 MG/DL (ref 70–105)
GLUCOSE BLDC GLUCOMTR-MCNC: 399 MG/DL (ref 70–105)
GLUCOSE BLDC GLUCOMTR-MCNC: 419 MG/DL (ref 70–105)
GLUCOSE BLDC GLUCOMTR-MCNC: 485 MG/DL (ref 70–105)
GLUCOSE SERPL-MCNC: 479 MG/DL (ref 65–99)
GLUCOSE UR STRIP-MCNC: ABNORMAL MG/DL
HADV DNA SPEC NAA+PROBE: NOT DETECTED
HCO3 BLDA-SCNC: 32.4 MMOL/L (ref 21–28)
HCOV 229E RNA SPEC QL NAA+PROBE: NOT DETECTED
HCOV HKU1 RNA SPEC QL NAA+PROBE: NOT DETECTED
HCOV NL63 RNA SPEC QL NAA+PROBE: NOT DETECTED
HCOV OC43 RNA SPEC QL NAA+PROBE: NOT DETECTED
HCT VFR BLD AUTO: 40 % (ref 34–46.6)
HEMODILUTION: NO
HGB BLD-MCNC: 13.6 G/DL (ref 12–15.9)
HGB UR QL STRIP.AUTO: ABNORMAL
HMPV RNA NPH QL NAA+NON-PROBE: NOT DETECTED
HOLD SPECIMEN: NORMAL
HPIV1 RNA ISLT QL NAA+PROBE: NOT DETECTED
HPIV2 RNA SPEC QL NAA+PROBE: NOT DETECTED
HPIV3 RNA NPH QL NAA+PROBE: NOT DETECTED
HPIV4 P GENE NPH QL NAA+PROBE: NOT DETECTED
HYALINE CASTS UR QL AUTO: ABNORMAL /LPF
IMM GRANULOCYTES # BLD AUTO: 0.27 10*3/MM3 (ref 0–0.05)
IMM GRANULOCYTES NFR BLD AUTO: 1.7 % (ref 0–0.5)
INHALED O2 CONCENTRATION: 21 %
INR PPP: 1.18 (ref 0.9–1.1)
KETONES UR QL STRIP: ABNORMAL
LEUKOCYTE ESTERASE UR QL STRIP.AUTO: NEGATIVE
LIPASE SERPL-CCNC: 10 U/L (ref 13–60)
LYMPHOCYTES # BLD AUTO: 0.73 10*3/MM3 (ref 0.7–3.1)
LYMPHOCYTES NFR BLD AUTO: 4.5 % (ref 19.6–45.3)
Lab: ABNORMAL
M PNEUMO IGG SER IA-ACNC: NOT DETECTED
MAGNESIUM SERPL-MCNC: 1.4 MG/DL (ref 1.6–2.6)
MCH RBC QN AUTO: 28.5 PG (ref 26.6–33)
MCHC RBC AUTO-ENTMCNC: 34 G/DL (ref 31.5–35.7)
MCV RBC AUTO: 83.7 FL (ref 79–97)
METHADONE UR QL SCN: NEGATIVE
MODALITY: ABNORMAL
MONOCYTES # BLD AUTO: 0.72 10*3/MM3 (ref 0.1–0.9)
MONOCYTES NFR BLD AUTO: 4.5 % (ref 5–12)
NEUTROPHILS NFR BLD AUTO: 14.29 10*3/MM3 (ref 1.7–7)
NEUTROPHILS NFR BLD AUTO: 89 % (ref 42.7–76)
NITRITE UR QL STRIP: NEGATIVE
NOTIFIED WHO: ABNORMAL
NRBC BLD AUTO-RTO: 0 /100 WBC (ref 0–0.2)
NT-PROBNP SERPL-MCNC: 2369 PG/ML (ref 0–900)
OPIATES UR QL: NEGATIVE
OSMOLALITY SERPL: 316 MOSM/KG (ref 275–295)
OXYCODONE UR QL SCN: NEGATIVE
PCO2 BLDA: 28.9 MM HG (ref 35–48)
PCP UR QL SCN: NEGATIVE
PH BLDA: 7.66 PH UNITS (ref 7.35–7.45)
PH UR STRIP.AUTO: 5.5 [PH] (ref 5–8)
PHOSPHATE SERPL-MCNC: 2 MG/DL (ref 2.5–4.5)
PLATELET # BLD AUTO: 187 10*3/MM3 (ref 140–450)
PMV BLD AUTO: 11.9 FL (ref 6–12)
PO2 BLD: 318 MM[HG] (ref 0–500)
PO2 BLDA: 66.8 MM HG (ref 83–108)
POTASSIUM SERPL-SCNC: 3 MMOL/L (ref 3.5–5.2)
PROCALCITONIN SERPL-MCNC: 3.29 NG/ML (ref 0–0.25)
PROT SERPL-MCNC: 7.6 G/DL (ref 6–8.5)
PROT UR QL STRIP: ABNORMAL
PROTHROMBIN TIME: 14.9 SECONDS (ref 11.7–14.2)
RBC # BLD AUTO: 4.78 10*6/MM3 (ref 3.77–5.28)
RBC # UR STRIP: ABNORMAL /HPF
READ BACK: ABNORMAL
REF LAB TEST METHOD: ABNORMAL
RHINOVIRUS RNA SPEC NAA+PROBE: NOT DETECTED
RSV RNA NPH QL NAA+NON-PROBE: DETECTED
SALICYLATES SERPL-MCNC: <0.5 MG/DL
SAO2 % BLDCOA: 96.6 % (ref 94–98)
SARS-COV-2 RNA RESP QL NAA+PROBE: NOT DETECTED
SODIUM SERPL-SCNC: 137 MMOL/L (ref 136–145)
SP GR UR STRIP: 1.04 (ref 1–1.03)
SQUAMOUS #/AREA URNS HPF: ABNORMAL /HPF
TRICYCLICS UR QL SCN: POSITIVE
TROPONIN T % DELTA: -2
TROPONIN T NUMERIC DELTA: -1 NG/L
TROPONIN T SERPL HS-MCNC: 58 NG/L
TSH SERPL DL<=0.05 MIU/L-ACNC: 0.96 UIU/ML (ref 0.27–4.2)
UNIDENT CRYS URNS QL MICRO: ABNORMAL /HPF
UROBILINOGEN UR QL STRIP: ABNORMAL
WBC # UR STRIP: ABNORMAL /HPF
WBC NRBC COR # BLD AUTO: 16.05 10*3/MM3 (ref 3.4–10.8)
WHOLE BLOOD HOLD COAG: NORMAL
WHOLE BLOOD HOLD SPECIMEN: NORMAL
YEAST URNS QL MICRO: ABNORMAL /HPF

## 2025-03-24 PROCEDURE — 99222 1ST HOSP IP/OBS MODERATE 55: CPT | Performed by: SURGERY

## 2025-03-24 PROCEDURE — 80143 DRUG ASSAY ACETAMINOPHEN: CPT

## 2025-03-24 PROCEDURE — 36415 COLL VENOUS BLD VENIPUNCTURE: CPT

## 2025-03-24 PROCEDURE — 82009 KETONE BODYS QUAL: CPT

## 2025-03-24 PROCEDURE — 25010000002 VANCOMYCIN 1.75-0.9 GM/500ML-% SOLUTION

## 2025-03-24 PROCEDURE — 85610 PROTHROMBIN TIME: CPT

## 2025-03-24 PROCEDURE — 70450 CT HEAD/BRAIN W/O DYE: CPT

## 2025-03-24 PROCEDURE — 63710000001 INSULIN LISPRO (HUMAN) PER 5 UNITS

## 2025-03-24 PROCEDURE — 82948 REAGENT STRIP/BLOOD GLUCOSE: CPT

## 2025-03-24 PROCEDURE — 80306 DRUG TEST PRSMV INSTRMNT: CPT

## 2025-03-24 PROCEDURE — 84300 ASSAY OF URINE SODIUM: CPT | Performed by: INTERNAL MEDICINE

## 2025-03-24 PROCEDURE — 84484 ASSAY OF TROPONIN QUANT: CPT

## 2025-03-24 PROCEDURE — 93005 ELECTROCARDIOGRAM TRACING: CPT

## 2025-03-24 PROCEDURE — 87040 BLOOD CULTURE FOR BACTERIA: CPT | Performed by: EMERGENCY MEDICINE

## 2025-03-24 PROCEDURE — 25010000002 POTASSIUM CHLORIDE 10 MEQ/100ML SOLUTION: Performed by: EMERGENCY MEDICINE

## 2025-03-24 PROCEDURE — 83735 ASSAY OF MAGNESIUM: CPT

## 2025-03-24 PROCEDURE — 83605 ASSAY OF LACTIC ACID: CPT

## 2025-03-24 PROCEDURE — 25810000003 SODIUM CHLORIDE 0.9 % SOLUTION

## 2025-03-24 PROCEDURE — 84145 PROCALCITONIN (PCT): CPT

## 2025-03-24 PROCEDURE — 0202U NFCT DS 22 TRGT SARS-COV-2: CPT

## 2025-03-24 PROCEDURE — 99285 EMERGENCY DEPT VISIT HI MDM: CPT

## 2025-03-24 PROCEDURE — 83690 ASSAY OF LIPASE: CPT

## 2025-03-24 PROCEDURE — 74177 CT ABD & PELVIS W/CONTRAST: CPT

## 2025-03-24 PROCEDURE — 63710000001 INSULIN GLARGINE PER 5 UNITS

## 2025-03-24 PROCEDURE — 80050 GENERAL HEALTH PANEL: CPT | Performed by: EMERGENCY MEDICINE

## 2025-03-24 PROCEDURE — 82803 BLOOD GASES ANY COMBINATION: CPT

## 2025-03-24 PROCEDURE — 71045 X-RAY EXAM CHEST 1 VIEW: CPT

## 2025-03-24 PROCEDURE — 25010000002 ONDANSETRON PER 1 MG

## 2025-03-24 PROCEDURE — 25010000002 CEFTRIAXONE PER 250 MG

## 2025-03-24 PROCEDURE — 36600 WITHDRAWAL OF ARTERIAL BLOOD: CPT

## 2025-03-24 PROCEDURE — 84100 ASSAY OF PHOSPHORUS: CPT

## 2025-03-24 PROCEDURE — 25010000002 LORAZEPAM PER 2 MG

## 2025-03-24 PROCEDURE — 82077 ASSAY SPEC XCP UR&BREATH IA: CPT

## 2025-03-24 PROCEDURE — 85730 THROMBOPLASTIN TIME PARTIAL: CPT

## 2025-03-24 PROCEDURE — P9612 CATHETERIZE FOR URINE SPEC: HCPCS

## 2025-03-24 PROCEDURE — 25810000003 SODIUM CHLORIDE 0.9 % SOLUTION: Performed by: INTERNAL MEDICINE

## 2025-03-24 PROCEDURE — 25810000003 SODIUM CHLORIDE 0.9 % SOLUTION: Performed by: STUDENT IN AN ORGANIZED HEALTH CARE EDUCATION/TRAINING PROGRAM

## 2025-03-24 PROCEDURE — 25010000002 MAGNESIUM SULFATE 2 GM/50ML SOLUTION: Performed by: STUDENT IN AN ORGANIZED HEALTH CARE EDUCATION/TRAINING PROGRAM

## 2025-03-24 PROCEDURE — 81001 URINALYSIS AUTO W/SCOPE: CPT

## 2025-03-24 PROCEDURE — 25510000001 IOPAMIDOL PER 1 ML

## 2025-03-24 PROCEDURE — 80179 DRUG ASSAY SALICYLATE: CPT

## 2025-03-24 PROCEDURE — 83930 ASSAY OF BLOOD OSMOLALITY: CPT

## 2025-03-24 PROCEDURE — 83880 ASSAY OF NATRIURETIC PEPTIDE: CPT

## 2025-03-24 RX ORDER — NICOTINE POLACRILEX 4 MG
15 LOZENGE BUCCAL
Status: DISCONTINUED | OUTPATIENT
Start: 2025-03-24 | End: 2025-03-28 | Stop reason: HOSPADM

## 2025-03-24 RX ORDER — INSULIN LISPRO 100 [IU]/ML
6 INJECTION, SOLUTION INTRAVENOUS; SUBCUTANEOUS ONCE
Status: COMPLETED | OUTPATIENT
Start: 2025-03-24 | End: 2025-03-24

## 2025-03-24 RX ORDER — IOPAMIDOL 755 MG/ML
100 INJECTION, SOLUTION INTRAVASCULAR
Status: COMPLETED | OUTPATIENT
Start: 2025-03-24 | End: 2025-03-24

## 2025-03-24 RX ORDER — VANCOMYCIN 1.75 GRAM/500 ML IN 0.9 % SODIUM CHLORIDE INTRAVENOUS
20 ONCE
Status: COMPLETED | OUTPATIENT
Start: 2025-03-24 | End: 2025-03-24

## 2025-03-24 RX ORDER — ONDANSETRON 4 MG/1
4 TABLET, ORALLY DISINTEGRATING ORAL EVERY 6 HOURS PRN
Status: DISCONTINUED | OUTPATIENT
Start: 2025-03-24 | End: 2025-03-28 | Stop reason: HOSPADM

## 2025-03-24 RX ORDER — FENTANYL/ROPIVACAINE/NS/PF 2-625MCG/1
15 PLASTIC BAG, INJECTION (ML) EPIDURAL ONCE
Status: COMPLETED | OUTPATIENT
Start: 2025-03-24 | End: 2025-03-25

## 2025-03-24 RX ORDER — IBUPROFEN 400 MG/1
800 TABLET, FILM COATED ORAL ONCE
Status: DISCONTINUED | OUTPATIENT
Start: 2025-03-24 | End: 2025-03-24

## 2025-03-24 RX ORDER — ONDANSETRON 2 MG/ML
4 INJECTION INTRAMUSCULAR; INTRAVENOUS EVERY 6 HOURS PRN
Status: DISCONTINUED | OUTPATIENT
Start: 2025-03-24 | End: 2025-03-28 | Stop reason: HOSPADM

## 2025-03-24 RX ORDER — ACETAMINOPHEN 650 MG/1
650 SUPPOSITORY RECTAL ONCE
Status: DISCONTINUED | OUTPATIENT
Start: 2025-03-24 | End: 2025-03-24

## 2025-03-24 RX ORDER — PANTOPRAZOLE SODIUM 40 MG/10ML
40 INJECTION, POWDER, LYOPHILIZED, FOR SOLUTION INTRAVENOUS EVERY 12 HOURS SCHEDULED
Status: DISCONTINUED | OUTPATIENT
Start: 2025-03-24 | End: 2025-03-26

## 2025-03-24 RX ORDER — POTASSIUM CHLORIDE 7.45 MG/ML
10 INJECTION INTRAVENOUS
Status: COMPLETED | OUTPATIENT
Start: 2025-03-24 | End: 2025-03-24

## 2025-03-24 RX ORDER — BISACODYL 5 MG/1
5 TABLET, DELAYED RELEASE ORAL DAILY PRN
Status: DISCONTINUED | OUTPATIENT
Start: 2025-03-24 | End: 2025-03-28 | Stop reason: HOSPADM

## 2025-03-24 RX ORDER — INSULIN LISPRO 100 [IU]/ML
2-9 INJECTION, SOLUTION INTRAVENOUS; SUBCUTANEOUS EVERY 6 HOURS SCHEDULED
Status: DISCONTINUED | OUTPATIENT
Start: 2025-03-24 | End: 2025-03-27

## 2025-03-24 RX ORDER — IBUPROFEN 600 MG/1
1 TABLET ORAL
Status: DISCONTINUED | OUTPATIENT
Start: 2025-03-24 | End: 2025-03-28 | Stop reason: HOSPADM

## 2025-03-24 RX ORDER — ONDANSETRON 2 MG/ML
4 INJECTION INTRAMUSCULAR; INTRAVENOUS ONCE
Status: COMPLETED | OUTPATIENT
Start: 2025-03-24 | End: 2025-03-24

## 2025-03-24 RX ORDER — SODIUM CHLORIDE 0.9 % (FLUSH) 0.9 %
10 SYRINGE (ML) INJECTION AS NEEDED
Status: DISCONTINUED | OUTPATIENT
Start: 2025-03-24 | End: 2025-03-28 | Stop reason: HOSPADM

## 2025-03-24 RX ORDER — SODIUM CHLORIDE 9 MG/ML
125 INJECTION, SOLUTION INTRAVENOUS CONTINUOUS
Status: DISCONTINUED | OUTPATIENT
Start: 2025-03-24 | End: 2025-03-26

## 2025-03-24 RX ORDER — POLYETHYLENE GLYCOL 3350 17 G/17G
17 POWDER, FOR SOLUTION ORAL DAILY PRN
Status: DISCONTINUED | OUTPATIENT
Start: 2025-03-24 | End: 2025-03-28 | Stop reason: HOSPADM

## 2025-03-24 RX ORDER — AMOXICILLIN 250 MG
2 CAPSULE ORAL 2 TIMES DAILY PRN
Status: DISCONTINUED | OUTPATIENT
Start: 2025-03-24 | End: 2025-03-28 | Stop reason: HOSPADM

## 2025-03-24 RX ORDER — LORAZEPAM 2 MG/ML
1 INJECTION INTRAMUSCULAR ONCE
Status: COMPLETED | OUTPATIENT
Start: 2025-03-24 | End: 2025-03-24

## 2025-03-24 RX ORDER — BISACODYL 10 MG
10 SUPPOSITORY, RECTAL RECTAL DAILY PRN
Status: DISCONTINUED | OUTPATIENT
Start: 2025-03-24 | End: 2025-03-28 | Stop reason: HOSPADM

## 2025-03-24 RX ORDER — MAGNESIUM SULFATE HEPTAHYDRATE 40 MG/ML
2 INJECTION, SOLUTION INTRAVENOUS
Status: COMPLETED | OUTPATIENT
Start: 2025-03-24 | End: 2025-03-25

## 2025-03-24 RX ORDER — NEBIVOLOL 5 MG/1
10 TABLET ORAL DAILY
Status: DISCONTINUED | OUTPATIENT
Start: 2025-03-24 | End: 2025-03-28 | Stop reason: HOSPADM

## 2025-03-24 RX ORDER — ALBUTEROL SULFATE 0.83 MG/ML
2.5 SOLUTION RESPIRATORY (INHALATION) EVERY 4 HOURS PRN
Status: DISCONTINUED | OUTPATIENT
Start: 2025-03-24 | End: 2025-03-28 | Stop reason: HOSPADM

## 2025-03-24 RX ORDER — DEXTROSE MONOHYDRATE 25 G/50ML
25 INJECTION, SOLUTION INTRAVENOUS
Status: DISCONTINUED | OUTPATIENT
Start: 2025-03-24 | End: 2025-03-28 | Stop reason: HOSPADM

## 2025-03-24 RX ORDER — LORAZEPAM 2 MG/ML
INJECTION INTRAMUSCULAR
Status: COMPLETED
Start: 2025-03-24 | End: 2025-03-24

## 2025-03-24 RX ADMIN — POTASSIUM CHLORIDE 10 MEQ: 7.46 INJECTION, SOLUTION INTRAVENOUS at 19:01

## 2025-03-24 RX ADMIN — Medication 1750 MG: at 13:04

## 2025-03-24 RX ADMIN — MAGNESIUM SULFATE HEPTAHYDRATE 2 G: 40 INJECTION, SOLUTION INTRAVENOUS at 21:14

## 2025-03-24 RX ADMIN — SODIUM CHLORIDE 2000 ML: 9 INJECTION, SOLUTION INTRAVENOUS at 15:58

## 2025-03-24 RX ADMIN — MAGNESIUM SULFATE HEPTAHYDRATE 2 G: 40 INJECTION, SOLUTION INTRAVENOUS at 23:42

## 2025-03-24 RX ADMIN — ONDANSETRON 4 MG: 2 INJECTION, SOLUTION INTRAMUSCULAR; INTRAVENOUS at 13:02

## 2025-03-24 RX ADMIN — SODIUM CHLORIDE 15 MMOL: 9 INJECTION, SOLUTION INTRAVENOUS at 21:14

## 2025-03-24 RX ADMIN — INSULIN LISPRO 6 UNITS: 100 INJECTION, SOLUTION INTRAVENOUS; SUBCUTANEOUS at 11:53

## 2025-03-24 RX ADMIN — POTASSIUM CHLORIDE 10 MEQ: 7.46 INJECTION, SOLUTION INTRAVENOUS at 15:00

## 2025-03-24 RX ADMIN — INSULIN LISPRO 6 UNITS: 100 INJECTION, SOLUTION INTRAVENOUS; SUBCUTANEOUS at 14:05

## 2025-03-24 RX ADMIN — LORAZEPAM 1 MG: 2 INJECTION INTRAMUSCULAR at 14:20

## 2025-03-24 RX ADMIN — POTASSIUM CHLORIDE 10 MEQ: 7.46 INJECTION, SOLUTION INTRAVENOUS at 16:29

## 2025-03-24 RX ADMIN — LORAZEPAM 1 MG: 2 INJECTION INTRAMUSCULAR; INTRAVENOUS at 14:20

## 2025-03-24 RX ADMIN — POTASSIUM CHLORIDE 10 MEQ: 7.46 INJECTION, SOLUTION INTRAVENOUS at 14:06

## 2025-03-24 RX ADMIN — INSULIN LISPRO 7 UNITS: 100 INJECTION, SOLUTION INTRAVENOUS; SUBCUTANEOUS at 18:58

## 2025-03-24 RX ADMIN — INSULIN LISPRO 6 UNITS: 100 INJECTION, SOLUTION INTRAVENOUS; SUBCUTANEOUS at 23:47

## 2025-03-24 RX ADMIN — POTASSIUM CHLORIDE 10 MEQ: 7.46 INJECTION, SOLUTION INTRAVENOUS at 20:10

## 2025-03-24 RX ADMIN — CEFTRIAXONE 2000 MG: 2 INJECTION, POWDER, FOR SOLUTION INTRAMUSCULAR; INTRAVENOUS at 11:59

## 2025-03-24 RX ADMIN — SODIUM CHLORIDE 710 ML: 9 INJECTION, SOLUTION INTRAVENOUS at 15:23

## 2025-03-24 RX ADMIN — IOPAMIDOL 100 ML: 755 INJECTION, SOLUTION INTRAVENOUS at 12:51

## 2025-03-24 RX ADMIN — POTASSIUM CHLORIDE 10 MEQ: 7.46 INJECTION, SOLUTION INTRAVENOUS at 18:12

## 2025-03-24 RX ADMIN — INSULIN GLARGINE 20 UNITS: 100 INJECTION, SOLUTION SUBCUTANEOUS at 18:11

## 2025-03-24 RX ADMIN — PANTOPRAZOLE SODIUM 40 MG: 40 INJECTION, POWDER, LYOPHILIZED, FOR SOLUTION INTRAVENOUS at 21:27

## 2025-03-24 RX ADMIN — SODIUM CHLORIDE 1000 ML: 9 INJECTION, SOLUTION INTRAVENOUS at 11:57

## 2025-03-24 RX ADMIN — MAGNESIUM SULFATE HEPTAHYDRATE 2 G: 40 INJECTION, SOLUTION INTRAVENOUS at 20:10

## 2025-03-24 RX ADMIN — SODIUM CHLORIDE 125 ML/HR: 9 INJECTION, SOLUTION INTRAVENOUS at 23:33

## 2025-03-24 NOTE — ED NOTES
Sitter at bedside. Pt not redirectable or following commands. Pt remains disoriented x4. Pt attempting to pull IV's out and cardiac monitoring off

## 2025-03-24 NOTE — ED NOTES
Pt unable to take PO motrin and has allergy to tylenol Renetta WRAY notified no new orders at this time

## 2025-03-24 NOTE — H&P
[facility] Medicine Services  History & Physical    Patient Name: Serina Powell  : 1970  MRN: [id number]  Primary Care Physician:  Barbara Couch APRN  Date of admission: 3/24/2025  Date and Time of Service: 3/24/2025 at 1517    Subjective      Chief Complaint: Altered mental status    History of Present Illness: Serina Powell is a 54 y.o. female with a CMH of COPD, HTN, GERD, type 2 diabetes, anxiety/depression who presented to [facility] on 3/24/2025 with altered mental status.  Patient is a poor historian so history will be based on chart review and ED provider.  Per reports, patient was noted to be confused and altered beginning last night with reports of unwitnessed fall.  However, leading up to confusion patient was noted to be complaining of abdominal pain, nausea and vomiting x 2 weeks.    On ED evaluation, patient was noted to be febrile with temperature 104. ABG with pH 7.65, pCO2 28.9, pO2 66.8.  CMP was pertinent for creatinine 1.73, AG 18, potassium 3.0, magnesium 1.4, blood glucose 479.  LFTs unremarkable. CBC with leukocytosis with WBC of 16.05 but otherwise unremarkable.  Initial lactic acid 3.6, repeat 4.8.  Procalcitonin was noted to be 3.29.  Serum osmolality 316.  Initial troponin was 58, repeat 57.  proBNP elevated at 2369.  UA not concerning for infection.  EtOH negative.  APAP negative, salicylate negative as well.  UDS positive for THC, TCA, benzos and buprenorphine.  Respiratory panel positive for RSV.  CXR with no acute findings.  CT head with no acute findings.  CT abdomen pelvis was also done given history of reported abdominal pain which revealed evidence of esophagitis as well as cholelithiasis with generalized gallbladder distention, otherwise no mural thickening and surrounding inflammatory changes of gallbladder did not suggest acute cholecystitis.  Patient was given IV fluids, vancomycin and Rocephin in ED for sepsis protocol.  Patient was also  given subcutaneous insulin 6 units x 2 for hyperglycemia.  General surgery was consulted in ED given CT abdomen pelvis findings.  Of note, while in ED, it was reported that patient was allergic to Tylenol and aspirin, oral ibuprofen was ordered by ED provider for fever.However patient cannot follow commands and thus unable to swallow ibuprofen at this time. Hospital service to admit for further management.      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    COPD (chronic obstructive pulmonary disease)     Depression     Diabetes mellitus     Hyperlipidemia     Hypertension     Injury of back     Obesity     Seizures     Sleep apnea     Syncope without other cardiovascular symptoms        Past Surgical History:   Procedure Laterality Date    ADENOIDECTOMY      FOOT WOUND CLOSURE Left     INCISION AND DRAINAGE OF WOUND Left 06/21/2023    Procedure: incison and draiange, left Second and third partial ray resection, applciation of wound vac;  Surgeon: KASIE Donahue DPM;  Location: [facility];  Service: Podiatry;  Laterality: Left;    LAPAROSCOPIC TUBAL LIGATION      ORIF HUMERUS FRACTURE Left 02/10/2021    Procedure: HUMERUS PROXIMAL OPEN REDUCTION INTERNAL FIXATION;  Surgeon: Julián Tinoco MD;  Location: [facility];  Service: Orthopedics;  Laterality: Left;    TONSILLECTOMY         Family History: Family history is unknown by patient. Otherwise pertinent FHx was reviewed and not pertinent to current issue.    Social History:  reports that she has been smoking cigarettes. She started smoking about 42 years ago. She has a 42.2 pack-year smoking history. She has been exposed to tobacco smoke. She has never used smokeless tobacco. She reports that she does not drink alcohol and does not use drugs.    Home Medications:  Prior to Admission Medications       Prescriptions Last Dose Informant  Patient Reported? Taking?    albuterol (PROVENTIL) (2.5 MG/3ML) 0.083% nebulizer solution   No No    Take 2.5 mg by nebulization Every 4 (Four) Hours As Needed for Wheezing for up to 90 days. Indications: Spasm of Lung Air Passages    Doxepin HCl 6 MG tablet   No No    Take 1 tablet by mouth Daily.    famotidine (PEPCID) 20 MG tablet   No No    Take 1 tablet by mouth 2 (Two) Times a Day.    fluticasone-salmeterol (ADVAIR HFA) 45-21 MCG/ACT inhaler   No No    Inhale 2 puffs 2 (Two) Times a Day. Indications: Asthma    Glucagon (Gvoke HypoPen 2-Pack) 1 MG/0.2ML solution auto-injector   No No    Inject 1 mg under the skin into the appropriate area as directed 1 (One) Time As Needed (hypoglycemia) for up to 1 dose.    hydroCHLOROthiazide 25 MG tablet   No No    Take 1 tablet by mouth Daily for 180 days. Indications: Edema    hydrOXYzine (ATARAX) 25 MG tablet   No No    Take one tablet my mouth twice a day as needed for anxiety  Indications: Feeling Anxious    Insulin Glargine (LANTUS SOLOSTAR) 100 UNIT/ML injection pen   No No    Inject 20 Units under the skin into the appropriate area as directed Every Night.    Insulin Lispro (ADMELOG SOLOSTAR) 100 UNIT/ML injection pen   No No    Inject 11 Units under the skin into the appropriate area as directed 3 (Three) Times a Day With Meals. Indications: Type 2 Diabetes    metFORMIN (GLUCOPHAGE) 1000 MG tablet   No No    Take 1 tablet by mouth Every 12 (Twelve) Hours. Indications: Type 2 Diabetes    nebivolol (BYSTOLIC) 10 MG tablet   No No    Take 1 tablet by mouth Daily. Indications: High Blood Pressure    QUEtiapine (SEROquel) 200 MG tablet   No No    Take 1 tablet by mouth every night at bedtime.    QUEtiapine (SEROquel) 25 MG tablet   No No    Take 1 tablet by mouth Every Night. Indications: Major Depressive Disorder              Allergies:  Allergies   Allergen Reactions    Acetaminophen Unknown - High Severity    Aspirin Hives     Per daughter and mother    Codeine  Unknown - High Severity    Pregabalin Unknown - High Severity    Topiramate Unknown - High Severity    Valdecoxib Unknown - High Severity    Tramadol Swelling    Naproxen Sodium Hives    Tylenol With Codeine #3 [Acetaminophen-Codeine] Hives       Objective      Vitals:   Temp:  [100 °F (37.8 °C)-103 °F (39.4 °C)] 103 °F (39.4 °C)  Heart Rate:  [83-94] 91  Resp:  [20] 20  BP: (115-173)/() 115/75  Body mass index is 49.53 kg/m².    Physical Exam  General: 55 yo WF, Alert but confused, well nourished, no acute distress.  HENT: Normocephalic, normal hearing, dry oral mucosa, no scleral icterus.  Neck: Supple, nontender, no carotid bruits, no JVD, no LAD.  Lungs: Clear to auscultation, nonlabored respiration.  Heart: Tachycardic, no murmur, gallop or edema.  Abdomen: Soft, nontender, nondistended, + bowel sounds.  Musculoskeletal: S/p amputation second digit of left foot.  Normal range of motion and strength, no swelling.  Skin: Skin is warm, dry and pink, no rashes or lesions.  Neurologic: Does not follow commands.  Moves all extremities    Diagnostic Data:  Lab Results (last 24 hours)       Procedure Component Value Units Date/Time    High Sensitivity Troponin T 1Hr [[id number]]  (Abnormal) Collected: 03/24/25 1427    Specimen: Blood Updated: 03/24/25 1503     HS Troponin T 57 ng/L      Troponin T Numeric Delta -1 ng/L      Troponin T % Delta -2    Narrative:      High Sensitive Troponin T Reference Range:  <14.0 ng/L- Negative Female for AMI  <22.0 ng/L- Negative Male for AMI  >=14 - Abnormal Female indicating possible myocardial injury.  >=22 - Abnormal Male indicating possible myocardial injury.   Clinicians would have to utilize clinical acumen, EKG, Troponin, and serial changes to determine if it is an Acute Myocardial Infarction or myocardial injury due to an underlying chronic condition.         STAT Lactic Acid, Reflex [[id number]]  (Abnormal) Collected: 03/24/25 1427    Specimen: Blood Updated: 03/24/25  1501     Lactate 4.8 mmol/L     POC Glucose Q1H [[id number]]  (Abnormal) Collected: 03/24/25 1442    Specimen: Blood Updated: 03/24/25 1445     Glucose 399 mg/dL      Comment: Serial Number: [id number]:  [id number]       Magnesium [[id number]]  (Abnormal) Collected: 03/24/25 1134    Specimen: Blood Updated: 03/24/25 1407     Magnesium 1.4 mg/dL     Phosphorus [[id number]]  (Abnormal) Collected: 03/24/25 1134    Specimen: Blood Updated: 03/24/25 1407     Phosphorus 2.0 mg/dL     POC Glucose Once [[id number]]  (Abnormal) Collected: 03/24/25 1321    Specimen: Blood Updated: 03/24/25 1324     Glucose 419 mg/dL      Comment: Serial Number: [id number]:  [id number]       Osmolality, Serum [[id number]]  (Abnormal) Collected: 03/24/25 1134    Specimen: Blood Updated: 03/24/25 1304     Osmolality 316 mOsm/kg     High Sensitivity Troponin T [[id number]]  (Abnormal) Collected: 03/24/25 1148    Specimen: Blood Updated: 03/24/25 1254     HS Troponin T 58 ng/L     Narrative:      High Sensitive Troponin T Reference Range:  <14.0 ng/L- Negative Female for AMI  <22.0 ng/L- Negative Male for AMI  >=14 - Abnormal Female indicating possible myocardial injury.  >=22 - Abnormal Male indicating possible myocardial injury.   Clinicians would have to utilize clinical acumen, EKG, Troponin, and serial changes to determine if it is an Acute Myocardial Infarction or myocardial injury due to an underlying chronic condition.         BNP [[id number]]  (Abnormal) Collected: 03/24/25 1148    Specimen: Blood Updated: 03/24/25 1251     proBNP 2,369.0 pg/mL     Narrative:      This assay is used as an aid in the diagnosis of individuals suspected of having heart failure. It can be used as an aid in the diagnosis of acute decompensated heart failure (ADHF) in patients presenting with signs and symptoms of ADHF to the emergency department (ED). In addition, NT-proBNP of <300 pg/mL indicates ADHF is not likely.    Age Range Result Interpretation   "NT-proBNP Concentration (pg/mL:      <50             Positive            >450                   Gray                 300-450                    Negative             <300    50-75           Positive            >900                  Gray                300-900                  Negative            <300      >75             Positive            >1800                  Gray                300-1800                  Negative            <300    TSH Rfx On Abnormal To Free T4 [[id number]]  (Normal) Collected: 03/24/25 1148    Specimen: Blood Updated: 03/24/25 1251     TSH 0.958 uIU/mL     Procalcitonin [[id number]]  (Abnormal) Collected: 03/24/25 1148    Specimen: Blood Updated: 03/24/25 1251     Procalcitonin 3.29 ng/mL     Narrative:      As a Marker for Sepsis (Non-Neonates):    1. <0.5 ng/mL represents a low risk of severe sepsis and/or septic shock.  2. >2 ng/mL represents a high risk of severe sepsis and/or septic shock.    As a Marker for Lower Respiratory Tract Infections that require antibiotic therapy:    PCT on Admission    Antibiotic Therapy       6-12 Hrs later    >0.5                Strongly Recommended  >0.25 - <0.5        Recommended   0.1 - 0.25          Discouraged              Remeasure/reassess PCT  <0.1                Strongly Discouraged     Remeasure/reassess PCT    As 28 day mortality risk marker: \"Change in Procalcitonin Result\" (>80% or <=80%) if Day 0 (or Day 1) and Day 4 values are available. Refer to http://[email or web address]-pct-[email or web address]    Change in PCT <=80%  A decrease of PCT levels below or equal to 80% defines a positive change in PCT test result representing a higher risk for 28-day all-cause mortality of patients diagnosed with severe sepsis for septic shock.    Change in PCT >80%  A decrease of PCT levels of more than 80% defines a negative change in PCT result representing a lower risk for 28-day all-cause mortality of patients diagnosed with severe sepsis or septic shock.       Acetaminophen " Level [[id number]]  (Normal) Collected: 03/24/25 1148    Specimen: Blood Updated: 03/24/25 1250     Acetaminophen <5.0 mcg/mL     Narrative:      Acetaminophen Therapeutic Range  5-20 ug/mL      Hours after ingestion            Toxic Value    4 Hours                           150 ug/mL    8 Hours                            70 ug/mL   12 Hours                            40 ug/mL   16 Hours                            20 ug/mL    These values apply to a single ingestion only.     Ethanol [[id number]] Collected: 03/24/25 1148    Specimen: Blood Updated: 03/24/25 1250     Ethanol % <0.010 %     Narrative:      Plasma Ethanol Clinical Symptoms:    ETOH (%)               Clinical Symptom  .01-.05              No apparent influence  .03-.12              Euphoria, Diminished judgment and attention   .09-.25              Impaired comprehension, Muscle incoordination  .18-.30              Confusion, Staggered gait, Slurred speech  .25-.40              Markedly decreased response to stimuli, unable to stand or                        walk, vomitting, sleep or stupor  .35-.50              Comatose, Anesthesia, Subnormal body temperature        Salicylate Level [[id number]]  (Normal) Collected: 03/24/25 1148    Specimen: Blood Updated: 03/24/25 1250     Salicylate <0.5 mg/dL     Lipase [[id number]]  (Abnormal) Collected: 03/24/25 1148    Specimen: Blood Updated: 03/24/25 1250     Lipase 10 U/L     Respiratory Panel PCR w/COVID-19(SARS-CoV-2) [place]/SULY/KENYETTA/PAD/COR/LUIS ANGEL In-House, NP Swab in UTM/[facility], 2 HR TAT - Swab, Nasopharynx [[id number]]  (Abnormal) Collected: 03/24/25 1148    Specimen: Swab from Nasopharynx Updated: 03/24/25 1249     ADENOVIRUS, PCR Not Detected     Coronavirus 229E Not Detected     Coronavirus HKU1 Not Detected     Coronavirus NL63 Not Detected     Coronavirus OC43 Not Detected     COVID19 Not Detected     Human Metapneumovirus Not Detected     Human Rhinovirus/Enterovirus Not Detected     Influenza A PCR Not Detected      Influenza B PCR Not Detected     Parainfluenza Virus 1 Not Detected     Parainfluenza Virus 2 Not Detected     Parainfluenza Virus 3 Not Detected     Parainfluenza Virus 4 Not Detected     RSV, PCR Detected     Bordetella pertussis pcr Not Detected     Bordetella parapertussis PCR Not Detected     Chlamydophila pneumoniae PCR Not Detected     Mycoplasma pneumo by PCR Not Detected    Narrative:      In the setting of a positive respiratory panel with a viral infection PLUS a negative procalcitonin without other underlying concern for bacterial infection, consider observing off antibiotics or discontinuation of antibiotics and continue supportive care. If the respiratory panel is positive for atypical bacterial infection (Bordetella pertussis, Chlamydophila pneumoniae, or Mycoplasma pneumoniae), consider antibiotic de-escalation to target atypical bacterial infection.    Protime-INR [[id number]]  (Abnormal) Collected: 03/24/25 1134    Specimen: Blood Updated: 03/24/25 1223     Protime 14.9 Seconds      INR 1.18    aPTT [[id number]]  (Normal) Collected: 03/24/25 1134    Specimen: Blood Updated: 03/24/25 1223     PTT 27.3 seconds     Urine Drug Screen - Straight Cath [[id number]]  (Abnormal) Collected: 03/24/25 1142    Specimen: Urine from Straight Cath Updated: 03/24/25 1219     THC, Screen, Urine Positive     Phencyclidine (PCP), Urine Negative     Cocaine Screen, Urine Negative     Methamphetamine, Ur Negative     Opiate Screen Negative     Amphetamine Screen, Urine Negative     Benzodiazepine Screen, Urine Positive     Tricyclic Antidepressants Screen Positive     Methadone Screen, Urine Negative     Barbiturates Screen, Urine Negative     Oxycodone Screen, Urine Negative     Buprenorphine, Screen, Urine Positive    Narrative:      Cutoff For Drugs Screened:    Amphetamines               500 ng/ml  Barbiturates               200 ng/ml  Benzodiazepines            150 ng/ml  Cocaine                    150  ng/ml  Methadone                  200 ng/ml  Opiates                    100 ng/ml  Phencyclidine               25 ng/ml  THC                         50 ng/ml  Methamphetamine            500 ng/ml  Tricyclic Antidepressants  300 ng/ml  Oxycodone                  100 ng/ml  Buprenorphine               10 ng/ml    The normal value for all drugs tested is negative. This report includes unconfirmed screening results, with the cutoff values listed, to be used for medical treatment purposes only.  Unconfirmed results must not be used for non-medical purposes such as employment or legal testing.  Clinical consideration should be applied to any drug of abuse test, particularly when unconfirmed results are used.    All urine drugs of abuse requests without chain of custody are for medical screening purposes only.  False positives are possible.      Urinalysis, Microscopic Only - Straight Cath [[id number]]  (Abnormal) Collected: 03/24/25 1142    Specimen: Urine from Straight Cath Updated: 03/24/25 1219     RBC, UA 0-2 /HPF      WBC, UA None Seen /HPF      Comment: Urine culture not indicated.        Bacteria, UA Trace /HPF      Squamous Epithelial Cells, UA 3-6 /HPF      Yeast, UA Small/1+ Budding Yeast /HPF      Hyaline Casts, UA None Seen /LPF      Amorphous Urate Crystals, UA Moderate/2+ /HPF      Methodology Manual Light Microscopy    Comprehensive Metabolic Panel [[id number]]  (Abnormal) Collected: 03/24/25 1134    Specimen: Blood Updated: 03/24/25 1216     Glucose 479 mg/dL      BUN 22 mg/dL      Creatinine 1.73 mg/dL      Sodium 137 mmol/L      Potassium 3.0 mmol/L      Chloride 89 mmol/L      CO2 29.9 mmol/L      Calcium 10.0 mg/dL      Total Protein 7.6 g/dL      Albumin 4.0 g/dL      ALT (SGPT) 18 U/L      AST (SGOT) 65 U/L      Alkaline Phosphatase 75 U/L      Total Bilirubin 0.6 mg/dL      Globulin 3.6 gm/dL      A/G Ratio 1.1 g/dL      BUN/Creatinine Ratio 12.7     Anion Gap 18.1 mmol/L      eGFR 34.3 mL/min/1.73      Narrative:      GFR Categories in Chronic Kidney Disease (CKD)      GFR Category          GFR (mL/min/1.73)    Interpretation  G1                     90 or greater         Normal or high (1)  G2                      60-89                Mild decrease (1)  G3a                   45-59                Mild to moderate decrease  G3b                   30-44                Moderate to severe decrease  G4                    15-29                Severe decrease  G5                    14 or less           Kidney failure          (1)In the absence of evidence of kidney disease, neither GFR category G1 or G2 fulfill the criteria for CKD.    eGFR calculation 2021 CKD-EPI creatinine equation, which does not include race as a factor    Ketone Bodies, Serum (Not performed at [facility]) [[id number]]  (Abnormal) Collected: 03/24/25 1134    Specimen: Blood Updated: 03/24/25 1214    Narrative:      The following orders were created for panel order Ketone Bodies, Serum (Not performed at [facility]).  Procedure                               Abnormality         Status                     ---------                               -----------         ------                     Acetone[[id number]]                      Abnormal            Final result                 Please view results for these tests on the individual orders.    Acetone [[id number]]  (Abnormal) Collected: 03/24/25 1134    Specimen: Blood Updated: 03/24/25 1214     Acetone Small    Blood Gas, Arterial - [[id number]]  (Abnormal) Collected: 03/24/25 1147    Specimen: Arterial Blood Updated: 03/24/25 1207     Site Left Radial     Hernán's Test Positive     pH, Arterial 7.657 pH units      pCO2, Arterial 28.9 mm Hg      pO2, Arterial 66.8 mm Hg      HCO3, Arterial 32.4 mmol/L      Base Excess, Arterial 11.9 mmol/L      Comment: Serial Number: [id number]:  [id number]        O2 Saturation, Arterial 96.6 %      CO2 Content 33.2 mmol/L      Barometric Pressure for Blood Gas --     Comment:  N/A        Modality Room Air     FIO2 21 %      Notified Who --     Read Back --     Notified Time --     Hemodilution No     PO2/FIO2 318    Urinalysis With Culture If Indicated - Straight Cath [[id number]]  (Abnormal) Collected: 03/24/25 1142    Specimen: Urine from Straight Cath Updated: 03/24/25 1158     Color, UA Yellow     Appearance, UA Cloudy     Comment: Result checked          pH, UA 5.5     Specific Gravity, UA 1.036     Glucose, UA >=1000 mg/dL (3+)     Ketones, UA Trace     Bilirubin, UA Negative     Blood, UA Large (3+)     Protein, UA >=300 mg/dL (3+)     Leuk Esterase, UA Negative     Nitrite, UA Negative     Urobilinogen, UA 1.0 E.U./dL    Narrative:      In absence of clinical symptoms, the presence of pyuria, bacteria, and/or nitrites on the urinalysis result does not correlate with infection.    Blood Culture - Blood, Arm, Right [[id number]] Collected: 03/24/25 1148    Specimen: Blood from Arm, Right Updated: 03/24/25 1154    CBC & Differential [[id number]]  (Abnormal) Collected: 03/24/25 1134    Specimen: Blood Updated: 03/24/25 1150    Narrative:      The following orders were created for panel order CBC & Differential.  Procedure                               Abnormality         Status                     ---------                               -----------         ------                     CBC Auto Differential[[id number]]        Abnormal            Final result                 Please view results for these tests on the individual orders.    CBC Auto Differential [[id number]]  (Abnormal) Collected: 03/24/25 1134    Specimen: Blood Updated: 03/24/25 1150     WBC 16.05 10*3/mm3      RBC 4.78 10*6/mm3      Hemoglobin 13.6 g/dL      Hematocrit 40.0 %      MCV 83.7 fL      MCH 28.5 pg      MCHC 34.0 g/dL      RDW 12.7 %      RDW-SD 38.8 fl      MPV 11.9 fL      Platelets 187 10*3/mm3      Neutrophil % 89.0 %      Lymphocyte % 4.5 %      Monocyte % 4.5 %      Eosinophil % 0.1 %      Basophil % 0.2 %       Immature Grans % 1.7 %      Neutrophils, Absolute 14.29 10*3/mm3      Lymphocytes, Absolute 0.73 10*3/mm3      Monocytes, Absolute 0.72 10*3/mm3      Eosinophils, Absolute 0.01 10*3/mm3      Basophils, Absolute 0.03 10*3/mm3      Immature Grans, Absolute 0.27 10*3/mm3      nRBC 0.0 /100 WBC     [place] Draw [[id number]] Collected: 03/24/25 1134    Specimen: Blood Updated: 03/24/25 1145    Narrative:      The following orders were created for panel order [place] Draw.  Procedure                               Abnormality         Status                     ---------                               -----------         ------                     Green Top (Gel)[[id number]]                                  Final result               Lavender Top[[id number]]                                     Final result               Gold Top - SST[[id number]]                                   Final result               Light Blue Top[[id number]]                                   Final result                 Please view results for these tests on the individual orders.    Green Top (Gel) [[id number]] Collected: 03/24/25 1134    Specimen: Blood Updated: 03/24/25 1145     Extra Tube Hold for add-ons.     Comment: Auto resulted.       Lavender Top [[id number]] Collected: 03/24/25 1134    Specimen: Blood Updated: 03/24/25 1145     Extra Tube hold for add-on     Comment: Auto resulted       Gold Top - SST [[id number]] Collected: 03/24/25 1134    Specimen: Blood Updated: 03/24/25 1145     Extra Tube Hold for add-ons.     Comment: Auto resulted.       Light Blue Top [[id number]] Collected: 03/24/25 1134    Specimen: Blood Updated: 03/24/25 1145     Extra Tube Hold for add-ons.     Comment: Auto resulted       Extra Tubes [[id number]] Collected: 03/24/25 1134    Specimen: Blood Updated: 03/24/25 1145    Narrative:      The following orders were created for panel order Extra Tubes.  Procedure                               Abnormality         Status                      ---------                               -----------         ------                     Green Top (Gel)[[id number]]                                  Final result                 Please view results for these tests on the individual orders.    Green Top (Gel) [[id number]] Collected: 03/24/25 1134    Specimen: Blood Updated: 03/24/25 1145     Extra Tube Hold for add-ons.     Comment: Auto resulted.       Blood Culture - Blood, Arm, Left [[id number]] Collected: 03/24/25 1134    Specimen: Blood from Arm, Left Updated: 03/24/25 1141    POC Lactate [[id number]]  (Abnormal) Collected: 03/24/25 1132    Specimen: Blood Updated: 03/24/25 1134     Lactate 3.6 mmol/L      Comment: Serial Number: [id number]:  [id number]       POC Glucose Once [[id number]]  (Abnormal) Collected: 03/24/25 1118    Specimen: Blood Updated: 03/24/25 1125     Glucose 485 mg/dL      Comment: Serial Number: [id number]:  [id number]                Imaging Results (Last 24 Hours)       Procedure Component Value Units Date/Time    CT Abdomen Pelvis With Contrast [[id number]] Collected: 03/24/25 1253     Updated: 03/24/25 1259    Narrative:      CT ABDOMEN PELVIS W CONTRAST    Date of Exam: 3/24/2025 12:34 PM EDT    Indication: abd pain, N/V, AMS.    Comparison: Noncontrast CT abdomen pelvis 7/21/2012    Technique: Axial CT images were obtained of the abdomen and pelvis following the uneventful intravenous administration of iodinated contrast. Sagittal and coronal reconstructions were performed.  Automated exposure control and iterative reconstruction   methods were used.        Findings:  LUNG BASES:  Unremarkable without mass or infiltrate.    LIVER:  Unremarkable parenchyma without focal lesion.  BILIARY/GALLBLADDER: Cholelithiasis with generalized gallbladder distention with no mural thickening or surrounding inflammatory change to suggest acute cholecystitis. Correlate with symptoms.  SPLEEN:  Unremarkable  PANCREAS:   Unremarkable  ADRENAL:  Unremarkable  KIDNEYS:  Unremarkable parenchyma with no solid mass identified. No obstruction.  No calculus identified.  GASTROINTESTINAL/MESENTERY: There is mural thickening involving the visualized distal esophagus consistent with esophagitis. No evidence of obstruction nor additional inflammation.  The appendix is normal.  MESENTERIC VESSELS:  Patent.  AORTA/IVC:  Normal caliber.    RETROPERITONEUM/LYMPH NODES:  Unremarkable    REPRODUCTIVE:  Unremarkable  BLADDER:  Unremarkable    OSSEUS STRUCTURES: There is chronic bilateral L5 spondylolysis without significant spondylolisthesis at L5/S1.        Impression:      Impression:  1.Mural thickening involving the visualized distal esophagus consistent with esophagitis.  2.Cholelithiasis with generalized gallbladder distention with no mural thickening or surrounding inflammatory change to suggest acute cholecystitis. Correlate with symptoms.  3.Chronic bilateral L5 spondylolysis without significant spondylolisthesis at L5/S1.        Electronically Signed: Rigo Argueta MD    3/24/2025 12:57 PM EDT    Workstation ID: [id number]    CT Head Without Contrast [[id number]] Collected: 03/24/25 1252     Updated: 03/24/25 1256    Narrative:      CT HEAD WO CONTRAST    Date of Exam: 3/24/2025 12:33 PM EDT    Indication: fall, hit head, AMS.    Comparison: Head CT 2/9/2021    Technique: Axial CT images were obtained of the head without contrast administration.  Coronal reconstructions were performed.  Automated exposure control and iterative reconstruction methods were used.      Findings:  Negative for large territory infarct, acute intracranial hemorrhage, large mass lesion, hydrocephalus or midline shift. Parenchymal volume and density appears within normal limits for age. No large extra-axial fluid collection. Distal carotid   atherosclerotic disease. Orbits are symmetric. No obstructive sinus disease. No large mastoid effusion. Within limitations of  motion artifact there is no displaced calvarial fracture. Repeat imaging obtained to correct for motion, still without   visualized fracture. No aggressive appearing bone lesion.      Impression:      Impression:  No acute intracranial findings by CT.      Electronically Signed: Elpidio Chavis MD    3/24/2025 12:54 PM EDT    Workstation ID: [id number]    XR Chest 1 View [[id number]] Collected: 03/24/25 1228     Updated: 03/24/25 1230    Narrative:      XR CHEST 1 VW    Date of Exam: 3/24/2025 12:21 PM EDT    Indication: AMS    Comparison: 2/9/2021    Findings:  The cardiomediastinal silhouette is within normal limits. Lungs are clear. No focal consolidation, pneumothorax, or significant pleural effusion. Osseous structures grossly intact.      Impression:      Impression:  No acute process.          Electronically Signed: Agus Johnson MD    3/24/2025 12:28 PM EDT    Workstation ID: [id number]              Assessment & Plan        This is a 54 y.o. female with:    Active and Resolved Problems  Active Hospital Problems    Diagnosis  POA    **Altered mental status [R41.82]  Yes      Resolved Hospital Problems   No resolved problems to display.       Altered mental status  Sepsis  Fever   RSV infection  History of COPD  CT head with no acute findings  SIRS criteria: WBC 16, febrile, HR >90, AMS  Lactic 3.6, repeat 4.8, procalcitonin 3.29  Not requiring supplemental oxygen, no s/sx of AECOPD  UA not consistent with UTI  UDS positive for THC, TCA, benzos and buprenorphine, EtOH negative, APAP negative  Continue vancomycin and Rocephin, pharmacy to dose, MRSA screen pending  Given 1 L IVF's in ED, 2 mL ordered to complete sepsis fluid bolus ordered  Blood cultures pending  Noted allergy to Tylenol and aspirin, unable to follow commands to take oral ibuprofen, continue to treat fever without medications for now (i.e. ice packs, no blanket)  Keep n.p.o. while altered  Fall precautions  SCDs for VTE prophylaxis for now  Requiring  restraints and sitter at this time    Combined metabolic and respiratory alkalosis  Acute kidney injury  Hypokalemia  Hypomagnesemia  ABG with pH 7.65, pCO2 28.9, pO2 66.8  BUN/Cr 22/1.73, b/l 0.9  Continue to replete electrolytes per replacement protocol  Continue IVFs  Avoid nephrotoxic medications, hold HCTZ  May need nephrology consultation if does not improve with fluids  Nephrology consulted     Abdominal pain  Esophagitis  CT A/P revealed cholelithiasis with generalized gallbladder distention with no mural thickening or surrounding inflammation to suggest cholecystitis, but did reveal esophagitis  LFTs WNL  Protonix twice daily IV  General Surgery consulted awaiting further recs    Hyperglycemia  Type 2 diabetes mellitus  BG on arrival 485, received 6 units insulin subq x 2 in ED  Serum Osm 316  2/14/25 A1c 8.06  POC BG q6h while NPO   Moderate SSI   Hypoglycemia protocol     RSV infection  History of COPD  Not requiring supplemental oxygen, no s/sx of acute exacerbation  Supportive management at this time  DuoNeb prn     Hypertension  BP controlled  Resume home medications once appropriate    Mood disorder/anxiety-resume home medications once reconciled      VTE Prophylaxis:  Mechanical VTE prophylaxis orders are present.        The patient desires to be as follows:    CODE STATUS:    Code Status (Patient has no pulse and is not breathing): CPR (Attempt to Resuscitate)  Medical Interventions (Patient has pulse or is breathing): Full Support        Alyssa Gardner, who can be contacted at [phone], is the designated person to make medical decisions on the patient's behalf if She is incapable of doing so. This was clarified with patient and/or next of kin on 3/24/2025 during the course of this H&P.    Admission Status:  I believe this patient meets inpatient status.    Expected Length of Stay: 3 to 4 days    PDMP and Medication Dispenses via Sidebar reviewed and consistent with patient reported  medications.    I discussed the patient's findings and my recommendations with family.      Signature:     This document has been electronically signed by Kyle Hill PA-C on March 24, 2025 15:17 EDT   [facility]

## 2025-03-24 NOTE — PROGRESS NOTES
"Pharmacy Antimicrobial Dosing Service    Subjective:  Serina Powell is a 54 y.o.female admitted with altered mental status. Pharmacy has been consulted to dose Vancomycin for possible sepsis.    PMH: COPD, T2DM, MRSA + (2023)      Assessment/Plan    1. Day #1 Vancomycin: Pulse dosing d/t renal dysfxn.   Patient received a loading dose of 1750 mg (~19.8 mg/kg based on AdjBW) once. Patient's current Scr is nearly 2x her baseline. Patient was making some urine while in ER. Will plan to get random Vanc level with AM labs and plan to re-dose when level is <20 mcg/mL.    2. Day #1 Ceftriaxone: 2000mg IV q24h for CrCl 50.6 mL/min.    Will continue to monitor drug levels, renal function, culture and sensitivities, and patient clinical status.       Objective:  Relevant clinical data and objective history reviewed:  165.1 cm (65\")   135 kg (297 lb 9.9 oz)   Ideal body weight: 57 kg (125 lb 10.6 oz)  Adjusted ideal body weight: 88.2 kg (194 lb 7.1 oz)  Body mass index is 49.53 kg/m².        Results from last 7 days   Lab Units 03/24/25  1134   CREATININE mg/dL 1.73*     Estimated Creatinine Clearance: 50.6 mL/min (A) (by C-G formula based on SCr of 1.73 mg/dL (H)).  No intake/output data recorded.    Results from last 7 days   Lab Units 03/24/25  1134   WBC 10*3/mm3 16.05*     Temperature    03/24/25 1119 03/24/25 1139   Temp: 100 °F (37.8 °C) (!) 103 °F (39.4 °C)     Baseline culture/source/susceptibility:  Microbiology Results (last 10 days)       Procedure Component Value - Date/Time    Respiratory Panel PCR w/COVID-19(SARS-CoV-2) [facility]/KENYETTA/[facility]IS [facility] In-House, NP Swab in UTM/VTM, 2 HR TAT - Swab, Nasopharynx [[id number]]  (Abnormal) Collected: 03/24/25 1148    Lab Status: Final result Specimen: Swab from Nasopharynx Updated: 03/24/25 1249     ADENOVIRUS, PCR Not Detected     Coronavirus 229E Not Detected     Coronavirus HKU1 Not Detected     Coronavirus NL63 Not Detected     Coronavirus OC43 Not Detected     COVID19 " Not Detected     Human Metapneumovirus Not Detected     Human Rhinovirus/Enterovirus Not Detected     Influenza A PCR Not Detected     Influenza B PCR Not Detected     Parainfluenza Virus 1 Not Detected     Parainfluenza Virus 2 Not Detected     Parainfluenza Virus 3 Not Detected     Parainfluenza Virus 4 Not Detected     RSV, PCR Detected     Bordetella pertussis pcr Not Detected     Bordetella parapertussis PCR Not Detected     Chlamydophila pneumoniae PCR Not Detected     Mycoplasma pneumo by PCR Not Detected    Narrative:      In the setting of a positive respiratory panel with a viral infection PLUS a negative procalcitonin without other underlying concern for bacterial infection, consider observing off antibiotics or discontinuation of antibiotics and continue supportive care. If the respiratory panel is positive for atypical bacterial infection (Bordetella pertussis, Chlamydophila pneumoniae, or Mycoplasma pneumoniae), consider antibiotic de-escalation to target atypical bacterial infection.            Aracelis Cazares, Pharmacy Intern  03/24/25 16:51 EDT

## 2025-03-24 NOTE — CONSULTS
General Surgery Consult Note      Name: Serina Powell ADMIT: 3/24/2025   : 1970  PCP: Barbara Couch APRN    MRN: [id number] LOS: 0 days   AGE/SEX: 54 y.o. female  ROOM:    [facility]    Information per chart review and discussion with ED staff as patient is unable to provide history.  Patient is accompanied by her mother at the bedside however she is unsure of how the patient has been leading up to hospitalization.    Patient Care Team:  Barbara Couch APRN as PCP - General (Emergency Medicine)  Chio Salazar MD as Consulting Physician (Infectious Diseases)  [facility] as Primary Care Provider  Nica Pruitt RN as Ambulatory  ([facility])  Alesia Ballard MSW as  (Amb Case Mgmt) ([facility])  Chief Complaint   Patient presents with    Altered Mental Status       HPI  54 y.o. female brought in by her daughter for altered mental status.  Per report patient's daughter reports she has been disoriented today.  She reports that her mother had been complaining of abdominal pain, nausea, vomiting for the last 2 weeks.  On presentation to the emergency department the patient is completely disoriented and is moaning.  The patient is febrile.  CT scan of the abdomen/pelvis shows a distended gallbladder with gallstones but without evidence of inflammation.  General surgery was consulted given CT scan findings and the fact that the patient had endorsed abdominal pain to her daughter.  Note the patient did test positive for RSV.    Past Medical History:   Diagnosis Date    Anaclitic depression 2013    COPD (chronic obstructive pulmonary disease)     Depression     Diabetes mellitus     Hyperlipidemia     Hypertension     Injury of back     Obesity     Seizures     Sleep apnea     Syncope without other cardiovascular symptoms      Past Surgical History:   Procedure Laterality Date    ADENOIDECTOMY      FOOT WOUND CLOSURE Left     INCISION AND  DRAINAGE OF WOUND Left 06/21/2023    Procedure: incison and draiange, left Second and third partial ray resection, applciation of wound vac;  Surgeon: KASIE Donahue DPM;  Location: [facility] MAIN OR;  Service: Podiatry;  Laterality: Left;    LAPAROSCOPIC TUBAL LIGATION      ORIF HUMERUS FRACTURE Left 02/10/2021    Procedure: HUMERUS PROXIMAL OPEN REDUCTION INTERNAL FIXATION;  Surgeon: Julián Tinoco MD;  Location: [facility] MAIN OR;  Service: Orthopedics;  Laterality: Left;    TONSILLECTOMY       Family History   Family history unknown: Yes       Social History     Tobacco Use    Smoking status: Every Day     Current packs/day: 1.00     Average packs/day: 1 pack/day for 42.2 years (42.2 ttl pk-yrs)     Types: Cigarettes     Start date: 1983     Passive exposure: Current    Smokeless tobacco: Never   Vaping Use    Vaping status: Never Used   Substance Use Topics    Alcohol use: Never    Drug use: Never     (Not in a hospital admission)    [START ON 3/25/2025] cefTRIAXone, 2,000 mg, Intravenous, Q24H  insulin glargine, 20 Units, Subcutaneous, Nightly  insulin lispro, 2-9 Units, Subcutaneous, Q6H  [Held by provider] nebivolol, 10 mg, Oral, Daily  pantoprazole, 40 mg, Intravenous, Q12H  potassium chloride, 10 mEq, Intravenous, Q1H  sodium chloride, 2,000 mL, Intravenous, Once      Pharmacy to dose vancomycin,         albuterol    senna-docusate sodium **AND** polyethylene glycol **AND** bisacodyl **AND** bisacodyl    Calcium Replacement - Follow Nurse / BPA Driven Protocol    dextrose    dextrose    glucagon (human recombinant)    Magnesium Standard Dose Replacement - Follow Nurse / BPA Driven Protocol    Magnesium Standard Dose Replacement - Follow Nurse / BPA Driven Protocol    melatonin    ondansetron ODT **OR** ondansetron    Pharmacy to dose vancomycin    Phosphorus Replacement - Follow Nurse / BPA Driven Protocol    Potassium Replacement - Follow Nurse / BPA Driven Protocol    sodium chloride    [COMPLETED] Insert  Peripheral IV **AND** sodium chloride  Acetaminophen, Aspirin, Codeine, Pregabalin, Topiramate, Valdecoxib, Tramadol, Naproxen sodium, and Tylenol with codeine #3 [acetaminophen-codeine]    Review of Systems:   Unable to obtain    Vitals:  Temp:  [100 °F (37.8 °C)-103 °F (39.4 °C)] 103 °F (39.4 °C)  Heart Rate:  [83-94] 91  Resp:  [16-20] 16  BP: (115-173)/() 115/75     Physical Exam:   Alert but disoriented and moaning but not answering questions  Nonlabored respirations  Abdomen soft, obese, without obvious tenderness to palpation and without any guarding on palpation  Extremities with no gross deformities    Labs:  Results from last 7 days   Lab Units 03/24/25  1134   WBC 10*3/mm3 16.05*   HEMOGLOBIN g/dL 13.6   HEMATOCRIT % 40.0   PLATELETS 10*3/mm3 187     Results from last 7 days   Lab Units 03/24/25  1134   SODIUM mmol/L 137   POTASSIUM mmol/L 3.0*   CHLORIDE mmol/L 89*   CO2 mmol/L 29.9*   BUN mg/dL 22*   CREATININE mg/dL 1.73*   CALCIUM mg/dL 10.0   BILIRUBIN mg/dL 0.6   ALK PHOS U/L 75   ALT (SGPT) U/L 18   AST (SGOT) U/L 65*   GLUCOSE mg/dL 479*     Results from last 7 days   Lab Units 03/24/25  1134   INR  1.18*   APTT seconds 27.3       Imaging:  CT abdomen/pelvis 3/24/2025  Impression:  1.Mural thickening involving the visualized distal esophagus consistent with esophagitis.  2.Cholelithiasis with generalized gallbladder distention with no mural thickening or surrounding inflammatory change to suggest acute cholecystitis. Correlate with symptoms.  3.Chronic bilateral L5 spondylolysis without significant spondylolisthesis at L5/S1.     Assessment and Plan:  54 y.o. female admitted for altered mental status and reports of 2-week history of abdominal pain, nausea, vomiting.  Cholelithiasis and distended gallbladder on imaging.    - Clinically does not seem that the patient has acute cholecystitis and generally would not expect fever without more significant inflammation seen on imaging  - Etiology  of patient's symptoms is unclear though patient is unable to provide history  - Will order HIDA scan to further evaluate for possible cholecystitis but recommend looking for other sources of infection and may be secondary to RSV  - Further recommendations pending review of HIDA scan once completed    This note was created using Dragon Voice Recognition software.    Gin Gruber MD  03/24/25  16:37 EDT

## 2025-03-24 NOTE — CASE MANAGEMENT/SOCIAL WORK
Discharge Planning Assessment   Abhijeet     Patient Name: Serina Powell  MRN: [id number]  Today's Date: 3/24/2025    Admit Date: 3/24/2025    Plan: From Home with Family   Discharge Needs Assessment       Row Name 03/24/25 1701       Living Environment    People in Home child(satnam), adult    Name(s) of People in Home Vanessa    Current Living Arrangements apartment    Potentially Unsafe Housing Conditions none    In the past 12 months has the electric, gas, oil, or water company threatened to shut off services in your home? No    Primary Care Provided by self    Provides Primary Care For no one    Family Caregiver if Needed child(satnam), adult    Family Caregiver Names Vanessa-Daughter; Alyssa-Mother    Quality of Family Relationships supportive    Able to Return to Prior Arrangements yes    Living Arrangement Comments Home       Resource/Environmental Concerns    Resource/Environmental Concerns none    Transportation Concerns none       Transportation Needs    In the past 12 months, has lack of transportation kept you from medical appointments or from getting medications? no    In the past 12 months, has lack of transportation kept you from meetings, work, or from getting things needed for daily living? No       Food Insecurity    Within the past 12 months, you worried that your food would run out before you got the money to buy more. Never true    Within the past 12 months, the food you bought just didn't last and you didn't have money to get more. Never true       Transition Planning    Patient/Family Anticipates Transition to home with family;home with help/services    Patient/Family Anticipated Services at Transition none    Transportation Anticipated family or friend will provide       Discharge Needs Assessment    Readmission Within the Last 30 Days no previous admission in last 30 days    Equipment Currently Used at Home none    Concerns to be Addressed discharge planning    Do you want help finding or  keeping work or a job? Patient unable to answer    Do you want help with school or training? For example, starting or completing job training or getting a high school diploma, GED or equivalent Patient unable to answer    Anticipated Changes Related to Illness none    Equipment Needed After Discharge none    Provided Post Acute Provider List? N/A    Provided Post Acute Provider Quality & Resource List? N/A    Offered/Gave Vendor List no                   Discharge Plan       Row Name 03/24/25 1702       Plan    Plan From Home with Family    Patient/Family in Agreement with Plan yes    Plan Comments CM attempted to complete CM assessment at bedside, but pt unable to participate due to AMS. Mother, Alyssa, at bedside who assisted wit assement and  confirms PCP and pharm. States that prior to admission pt was independent with ADLs and IADLs and family can provide dc transportation. Final dc plan is pending clinical course.                  Selected Continued Care - Episodes Includes continued care and service providers with selected services from the active episodes listed below             Demographic Summary       Row Name 03/24/25 1700       General Information    Admission Type inpatient    Arrived From home    Referral Source emergency department    Reason for Consult discharge planning    Preferred Language English       Contact Information    Permission Granted to Share Info With                    Functional Status       Row Name 03/24/25 1700       Functional Status    Usual Activity Tolerance moderate    Current Activity Tolerance poor       Physical Activity    On average, how many days per week do you engage in moderate to strenuous exercise (like a brisk walk)? 0 days    On average, how many minutes do you engage in exercise at this level? 0 min    Number of minutes of exercise per week 0       Assessment of Health Literacy    How often do you have someone help you read hospital materials?  Occasionally    How often do you have problems learning about your medical condition because of difficulty understanding written information? Occasionally    How often do you have a problem understanding what is told to you about your medical condition? Occasionally    How confident are you filling out medical forms by yourself? Quite a bit    Health Literacy Good       Functional Status, IADL    Medications independent    Meal Preparation independent    Housekeeping independent    Laundry independent    Shopping independent    If for any reason you need help with day-to-day activities such as bathing, preparing meals, shopping, managing finances, etc., do you get the help you need? Patient unable to answer       Mental Status Summary    Recent Changes in Mental Status/Cognitive Functioning mental status       Employment/    Employment Status unemployed;disabled             Met with patient in room wearing PPE: mask    Maintained distance greater than six feet and spent less than 15 minutes in the room    Rosario Briones RN    Phone [phone]  Fax [phone]

## 2025-03-24 NOTE — PLAN OF CARE
Goal Outcome Evaluation:     ST consulted for dysphagia evaluation due to failed swallow screen. Per order and nurse report pt is not following commands at this time and remains confused. ST will continue to follow and assess pt when appropriate.

## 2025-03-24 NOTE — Clinical Note
Level of Care: Progressive Care [20]   Diagnosis: Altered mental status [780.97.ICD-9-CM]   Admitting Physician: LETY MYERS [[id number]]   Certification: I Certify That Inpatient Hospital Services Are Medically Necessary For Greater Than 2 Midnights

## 2025-03-24 NOTE — ED PROVIDER NOTES
Subjective   History of Present Illness  54-year-old female with history of diabetes, hypertension, seizures, COPD presents the ED today due to altered mental status and hyperglycemia along with ongoing nausea and vomiting that started last night.  Patient lives with her daughter and EMS reports that the daughter noticed the patient was confused and somehow fell hitting her head on the coffee table last night.    Daughter at bedside reports that patient became confused at 1 AM and that she did fall last night.  Daughter reports patient has been complaining of intermittent abdominal pain for 2 weeks and has had intermittent nausea vomiting for the last week.  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    COPD (chronic obstructive pulmonary disease)     Depression     Diabetes mellitus     Hyperlipidemia     Hypertension     Injury of back     Obesity     Seizures     Sleep apnea     Syncope without other cardiovascular symptoms        Allergies   Allergen Reactions    Acetaminophen Unknown - High Severity    Aspirin Hives     Per daughter and mother    Codeine Unknown - High Severity    Pregabalin Unknown - High Severity    Topiramate Unknown - High Severity    Valdecoxib Unknown - High Severity    Tramadol Swelling    Naproxen Sodium Hives    Tylenol With Codeine #3 [Acetaminophen-Codeine] Hives       Past Surgical History:   Procedure Laterality Date    ADENOIDECTOMY      FOOT WOUND CLOSURE Left     INCISION AND DRAINAGE OF WOUND Left 06/21/2023    Procedure: incison and draiange, left Second and third partial ray resection, applciation of wound vac;  Surgeon: KASIE Donahue DPM;  Location: [facility] MAIN OR;  Service: Podiatry;  Laterality: Left;    LAPAROSCOPIC TUBAL LIGATION      ORIF HUMERUS  FRACTURE Left 02/10/2021    Procedure: HUMERUS PROXIMAL OPEN REDUCTION INTERNAL FIXATION;  Surgeon: Julián Tinoco MD;  Location: [facility] MAIN OR;  Service: Orthopedics;  Laterality: Left;    TONSILLECTOMY         Family History   Family history unknown: Yes       Social History     Socioeconomic History    Marital status: Single   Tobacco Use    Smoking status: Every Day     Current packs/day: 1.00     Average packs/day: 1 pack/day for 42.2 years (42.2 ttl pk-yrs)     Types: Cigarettes     Start date: 1983     Passive exposure: Current    Smokeless tobacco: Never   Vaping Use    Vaping status: Never Used   Substance and Sexual Activity    Alcohol use: Never    Drug use: Never    Sexual activity: Defer           Objective   Physical Exam  Vitals reviewed.   HENT:      Head: Normocephalic.   Eyes:      Extraocular Movements: Extraocular movements intact.      Conjunctiva/sclera: Conjunctivae normal.      Pupils: Pupils are equal, round, and reactive to light.   Cardiovascular:      Rate and Rhythm: Normal rate and regular rhythm.      Pulses: Normal pulses.      Heart sounds: Normal heart sounds.   Pulmonary:      Effort: Pulmonary effort is normal.      Breath sounds: Wheezing present.      Comments: Mild expiratory wheezes  Abdominal:      General: Bowel sounds are normal.      Palpations: Abdomen is soft.      Tenderness: There is abdominal tenderness.      Comments: Patient does shake her head yes for pain when palpating the epigastric and right upper quadrant.  No peritonitis or rigidity   Musculoskeletal:      Right lower leg: Edema present.      Left lower leg: Edema present.      Comments: 1+ edema to bilateral lower extremities.  Patient moves all extremities on assessment   Neurological:      Mental Status: She is alert. She is confused.      Comments: Patient not answering any questions verbally but will shake head yes or no.  Does follow some commands.  Moves all extremities on assessment   Psychiatric:    "      Mood and Affect: Mood normal.         Behavior: Behavior normal.         Procedures    EKG independently interpreted by Dr. Hernandez as sinus rhythm with PAC at a rate of 88.  Compared to previous from 2/9/2021 that was sinus rhythm at a rate of 75         ED Course  ED Course as of 03/24/25 1649   Mon Mar 24, 2025   1126 EKG and rectal temp requested [KB]   1136 Marked ready for CT [KB]   1145 After discussion with [facility], patient tylenol allergy listed in 2013 but had norco in 2023. Ordered tylenol for fever [KB]   1146 Requested ABG and szr precautions [KB]   1210 Daughter states patient can't take tylenol d/t swelling. Can only take ibuprofen. Daughter states patient's mother states she can't take aspirin as well d/t anaphylaxis allergy.  [KB]   1221 Requested nursing staff to do swallow screen prior to ibuprofen.  If she is not able to pass swallow screen then she will likely need an NG tube for ibuprofen [KB]   1301 RSV, PCR(!): Detected [KB]   1307 Attempted swallow screen but patient spit the water back out so she failed the swallow screen.  Unable to give ibuprofen due to this.  Will try to give cool fluids.  Requested nursing staff to order the potassium replacement [KB]   1401 Call placed to surgery, awaiting call back [KB]   1455 Spoke to Kyle ALLISON with hospitalist group who agrees to admit patient. Awaiting surgery call back at this time [KB]   1511 Lactate(!!): 4.8  Sepsis bolus ordered [KB]   1617 Spoke to Dr. Gruber who states to obtain a HIDA scan to assess for cholecystitis and agreed to consult [KB]      ED Course User Index  [KB] Marycarmen Tam, APRN      /75   Pulse 91   Temp (!) 103 °F (39.4 °C) (Rectal)   Resp 16   Ht 165.1 cm (65\")   Wt 135 kg (297 lb 9.9 oz)   SpO2 98%   BMI 49.53 kg/m²   Labs Reviewed   RESPIRATORY PANEL PCR W/ COVID-19 (SARS-COV-2), NP SWAB IN UTM/VTP, 2 HR TAT - Abnormal; Notable for the following components:       Result Value    RSV, PCR Detected (*) "     All other components within normal limits    Narrative:     In the setting of a positive respiratory panel with a viral infection PLUS a negative procalcitonin without other underlying concern for bacterial infection, consider observing off antibiotics or discontinuation of antibiotics and continue supportive care. If the respiratory panel is positive for atypical bacterial infection (Bordetella pertussis, Chlamydophila pneumoniae, or Mycoplasma pneumoniae), consider antibiotic de-escalation to target atypical bacterial infection.   COMPREHENSIVE METABOLIC PANEL - Abnormal; Notable for the following components:    Glucose 479 (*)     BUN 22 (*)     Creatinine 1.73 (*)     Potassium 3.0 (*)     Chloride 89 (*)     CO2 29.9 (*)     AST (SGOT) 65 (*)     Anion Gap 18.1 (*)     eGFR 34.3 (*)     All other components within normal limits    Narrative:     GFR Categories in Chronic Kidney Disease (CKD)      GFR Category          GFR (mL/min/1.73)    Interpretation  G1                     90 or greater         Normal or high (1)  G2                      60-89                Mild decrease (1)  G3a                   45-59                Mild to moderate decrease  G3b                   30-44                Moderate to severe decrease  G4                    15-29                Severe decrease  G5                    14 or less           Kidney failure          (1)In the absence of evidence of kidney disease, neither GFR category G1 or G2 fulfill the criteria for CKD.    eGFR calculation 2021 CKD-EPI creatinine equation, which does not include race as a factor   CBC WITH AUTO DIFFERENTIAL - Abnormal; Notable for the following components:    WBC 16.05 (*)     Neutrophil % 89.0 (*)     Lymphocyte % 4.5 (*)     Monocyte % 4.5 (*)     Eosinophil % 0.1 (*)     Immature Grans % 1.7 (*)     Neutrophils, Absolute 14.29 (*)     Immature Grans, Absolute 0.27 (*)     All other components within normal limits   LACTIC ACID, REFLEX -  Abnormal; Notable for the following components:    Lactate 4.8 (*)     All other components within normal limits   BLOOD GAS, ARTERIAL - Abnormal; Notable for the following components:    pH, Arterial 7.657 (*)     pCO2, Arterial 28.9 (*)     pO2, Arterial 66.8 (*)     HCO3, Arterial 32.4 (*)     Base Excess, Arterial 11.9 (*)     CO2 Content 33.2 (*)     All other components within normal limits   MAGNESIUM - Abnormal; Notable for the following components:    Magnesium 1.4 (*)     All other components within normal limits   PHOSPHORUS - Abnormal; Notable for the following components:    Phosphorus 2.0 (*)     All other components within normal limits   TROPONIN - Abnormal; Notable for the following components:    HS Troponin T 58 (*)     All other components within normal limits    Narrative:     High Sensitive Troponin T Reference Range:  <14.0 ng/L- Negative Female for AMI  <22.0 ng/L- Negative Male for AMI  >=14 - Abnormal Female indicating possible myocardial injury.  >=22 - Abnormal Male indicating possible myocardial injury.   Clinicians would have to utilize clinical acumen, EKG, Troponin, and serial changes to determine if it is an Acute Myocardial Infarction or myocardial injury due to an underlying chronic condition.        BNP (IN-HOUSE) - Abnormal; Notable for the following components:    proBNP 2,369.0 (*)     All other components within normal limits    Narrative:     This assay is used as an aid in the diagnosis of individuals suspected of having heart failure. It can be used as an aid in the diagnosis of acute decompensated heart failure (ADHF) in patients presenting with signs and symptoms of ADHF to the emergency department (ED). In addition, NT-proBNP of <300 pg/mL indicates ADHF is not likely.    Age Range Result Interpretation  NT-proBNP Concentration (pg/mL:      <50             Positive            >450                   Gray                 300-450                    Negative              <300    50-75           Positive            >900                  Gray                300-900                  Negative            <300      >75             Positive            >1800                  Gray                300-1800                  Negative            <300   URINE DRUG SCREEN - Abnormal; Notable for the following components:    THC, Screen, Urine Positive (*)     Benzodiazepine Screen, Urine Positive (*)     Tricyclic Antidepressants Screen Positive (*)     Buprenorphine, Screen, Urine Positive (*)     All other components within normal limits    Narrative:     Cutoff For Drugs Screened:    Amphetamines               500 ng/ml  Barbiturates               200 ng/ml  Benzodiazepines            150 ng/ml  Cocaine                    150 ng/ml  Methadone                  200 ng/ml  Opiates                    100 ng/ml  Phencyclidine               25 ng/ml  THC                         50 ng/ml  Methamphetamine            500 ng/ml  Tricyclic Antidepressants  300 ng/ml  Oxycodone                  100 ng/ml  Buprenorphine               10 ng/ml    The normal value for all drugs tested is negative. This report includes unconfirmed screening results, with the cutoff values listed, to be used for medical treatment purposes only.  Unconfirmed results must not be used for non-medical purposes such as employment or legal testing.  Clinical consideration should be applied to any drug of abuse test, particularly when unconfirmed results are used.    All urine drugs of abuse requests without chain of custody are for medical screening purposes only.  False positives are possible.     URINALYSIS W/ CULTURE IF INDICATED - Abnormal; Notable for the following components:    Appearance, UA Cloudy (*)     Specific Gravity, UA 1.036 (*)     Glucose, UA >=1000 mg/dL (3+) (*)     Ketones, UA Trace (*)     Blood, UA Large (3+) (*)     Protein, UA >=300 mg/dL (3+) (*)     All other components within normal limits    Narrative:      "In absence of clinical symptoms, the presence of pyuria, bacteria, and/or nitrites on the urinalysis result does not correlate with infection.   ACETONE - Abnormal; Notable for the following components:    Acetone Small (*)     All other components within normal limits   LIPASE - Abnormal; Notable for the following components:    Lipase 10 (*)     All other components within normal limits   PROCALCITONIN - Abnormal; Notable for the following components:    Procalcitonin 3.29 (*)     All other components within normal limits    Narrative:     As a Marker for Sepsis (Non-Neonates):    1. <0.5 ng/mL represents a low risk of severe sepsis and/or septic shock.  2. >2 ng/mL represents a high risk of severe sepsis and/or septic shock.    As a Marker for Lower Respiratory Tract Infections that require antibiotic therapy:    PCT on Admission    Antibiotic Therapy       6-12 Hrs later    >0.5                Strongly Recommended  >0.25 - <0.5        Recommended   0.1 - 0.25          Discouraged              Remeasure/reassess PCT  <0.1                Strongly Discouraged     Remeasure/reassess PCT    As 28 day mortality risk marker: \"Change in Procalcitonin Result\" (>80% or <=80%) if Day 0 (or Day 1) and Day 4 values are available. Refer to http://[email or web address]-pct-calculator.com    Change in PCT <=80%  A decrease of PCT levels below or equal to 80% defines a positive change in PCT test result representing a higher risk for 28-day all-cause mortality of patients diagnosed with severe sepsis for septic shock.    Change in PCT >80%  A decrease of PCT levels of more than 80% defines a negative change in PCT result representing a lower risk for 28-day all-cause mortality of patients diagnosed with severe sepsis or septic shock.      URINALYSIS, MICROSCOPIC ONLY - Abnormal; Notable for the following components:    Bacteria, UA Trace (*)     Squamous Epithelial Cells, UA 3-6 (*)     Yeast, UA Small/1+ Budding Yeast (*)     All other " components within normal limits   PROTIME-INR - Abnormal; Notable for the following components:    Protime 14.9 (*)     INR 1.18 (*)     All other components within normal limits   OSMOLALITY, SERUM - Abnormal; Notable for the following components:    Osmolality 316 (*)     All other components within normal limits   HIGH SENSITIVITIY TROPONIN T 1HR - Abnormal; Notable for the following components:    HS Troponin T 57 (*)     All other components within normal limits    Narrative:     High Sensitive Troponin T Reference Range:  <14.0 ng/L- Negative Female for AMI  <22.0 ng/L- Negative Male for AMI  >=14 - Abnormal Female indicating possible myocardial injury.  >=22 - Abnormal Male indicating possible myocardial injury.   Clinicians would have to utilize clinical acumen, EKG, Troponin, and serial changes to determine if it is an Acute Myocardial Infarction or myocardial injury due to an underlying chronic condition.        POCT GLUCOSE FINGERSTICK - Abnormal; Notable for the following components:    Glucose 485 (*)     All other components within normal limits   POC LACTATE - Abnormal; Notable for the following components:    Lactate 3.6 (*)     All other components within normal limits   POCT GLUCOSE FINGERSTICK - Abnormal; Notable for the following components:    Glucose 399 (*)     All other components within normal limits   POCT GLUCOSE FINGERSTICK - Abnormal; Notable for the following components:    Glucose 419 (*)     All other components within normal limits   ACETAMINOPHEN LEVEL - Normal    Narrative:     Acetaminophen Therapeutic Range  5-20 ug/mL      Hours after ingestion            Toxic Value    4 Hours                           150 ug/mL    8 Hours                            70 ug/mL   12 Hours                            40 ug/mL   16 Hours                            20 ug/mL    These values apply to a single ingestion only.    SALICYLATE LEVEL - Normal   TSH RFX ON ABNORMAL TO FREE T4 - Normal   APTT -  Normal   BLOOD CULTURE   BLOOD CULTURE   MRSA SCREEN, PCR   RAINBOW DRAW    Narrative:     The following orders were created for panel order [place] Draw.  Procedure                               Abnormality         Status                     ---------                               -----------         ------                     Green Top (Gel)[[id number]]                                  Final result               Lavender Top[[id number]]                                     Final result               Gold Top - SST[[id number]]                                   Final result               Light Blue Top[[id number]]                                   Final result                 Please view results for these tests on the individual orders.   ETHANOL    Narrative:     Plasma Ethanol Clinical Symptoms:    ETOH (%)               Clinical Symptom  .01-.05              No apparent influence  .03-.12              Euphoria, Diminished judgment and attention   .09-.25              Impaired comprehension, Muscle incoordination  .18-.30              Confusion, Staggered gait, Slurred speech  .25-.40              Markedly decreased response to stimuli, unable to stand or                        walk, vomitting, sleep or stupor  .35-.50              Comatose, Anesthesia, Subnormal body temperature       LACTIC ACID, REFLEX   POC LACTATE   POCT GLUCOSE FINGERSTICK   POCT GLUCOSE FINGERSTICK   POCT GLUCOSE FINGERSTICK   POCT GLUCOSE FINGERSTICK   POCT GLUCOSE FINGERSTICK   POCT GLUCOSE FINGERSTICK   POCT GLUCOSE FINGERSTICK   POCT GLUCOSE FINGERSTICK   POCT GLUCOSE FINGERSTICK   POCT GLUCOSE FINGERSTICK   POCT GLUCOSE FINGERSTICK   POCT GLUCOSE FINGERSTICK   POCT GLUCOSE FINGERSTICK   POCT GLUCOSE FINGERSTICK   CBC AND DIFFERENTIAL    Narrative:     The following orders were created for panel order CBC & Differential.  Procedure                               Abnormality         Status                     ---------                                -----------         ------                     CBC Auto Differential[[id number]]        Abnormal            Final result                 Please view results for these tests on the individual orders.   GREEN TOP   LAVENDER TOP   GOLD TOP - SST   LIGHT BLUE TOP   KETONE BODIES SERUM    Narrative:     The following orders were created for panel order Ketone Bodies, Serum (Not performed at [place]).  Procedure                               Abnormality         Status                     ---------                               -----------         ------                     Acetone[[id number]]                      Abnormal            Final result                 Please view results for these tests on the individual orders.   EXTRA TUBES    Narrative:     The following orders were created for panel order Extra Tubes.  Procedure                               Abnormality         Status                     ---------                               -----------         ------                     Green Top (Gel)[[id number]]                                  Final result                 Please view results for these tests on the individual orders.   GREEN TOP     Medications   sodium chloride 0.9 % flush 10 mL (has no administration in time range)   sodium chloride 0.9 % flush 10 mL (has no administration in time range)   Potassium Replacement - Follow Nurse / BPA Driven Protocol (has no administration in time range)   potassium chloride 10 mEq in 100 mL IVPB (10 mEq Intravenous New Bag 3/24/25 6844)   Magnesium Standard Dose Replacement - Follow Nurse / BPA Driven Protocol (has no administration in time range)   Magnesium Standard Dose Replacement - Follow Nurse / BPA Driven Protocol (has no administration in time range)   Phosphorus Replacement - Follow Nurse / BPA Driven Protocol (has no administration in time range)   Calcium Replacement - Follow Nurse / BPA Driven Protocol (has no administration in time range)    sennosides-docusate (PERICOLACE) 8.6-50 MG per tablet 2 tablet (has no administration in time range)     And   polyethylene glycol (MIRALAX) packet 17 g (has no administration in time range)     And   bisacodyl (DULCOLAX) EC tablet 5 mg (has no administration in time range)     And   bisacodyl (DULCOLAX) suppository 10 mg (has no administration in time range)   melatonin tablet 5 mg (has no administration in time range)   ondansetron ODT (ZOFRAN-ODT) disintegrating tablet 4 mg (has no administration in time range)     Or   ondansetron (ZOFRAN) injection 4 mg (has no administration in time range)   sodium chloride 0.9 % bolus 2,000 mL (2,000 mL Intravenous New Bag 3/24/25 1558)   cefTRIAXone (ROCEPHIN) 2,000 mg in sodium chloride 0.9 % 100 mL MBP (has no administration in time range)   Pharmacy to dose vancomycin (has no administration in time range)   dextrose (GLUTOSE) oral gel 15 g (has no administration in time range)   dextrose (D50W) (25 g/50 mL) IV injection 25 g (has no administration in time range)   glucagon (GLUCAGEN) injection 1 mg (has no administration in time range)   insulin lispro (HUMALOG/ADMELOG) injection 2-9 Units (has no administration in time range)   insulin glargine (LANTUS, SEMGLEE) injection 20 Units (has no administration in time range)   nebivolol (BYSTOLIC) tablet 10 mg ( Oral Dose Auto Held 4/1/25 0900)   albuterol (PROVENTIL) nebulizer solution 0.083% 2.5 mg/3mL (has no administration in time range)   pantoprazole (PROTONIX) injection 40 mg (has no administration in time range)   insulin lispro (HUMALOG/ADMELOG) injection 6 Units (6 Units Subcutaneous Given 3/24/25 1153)   sodium chloride 0.9 % bolus 1,000 mL ( Intravenous Currently Infusing 3/24/25 1413)   vancomycin IVPB 1750 mg in 0.9% Sodium Chloride (premix) 500 mL (0 mg Intravenous Stopped 3/24/25 1514)   cefTRIAXone (ROCEPHIN) 2,000 mg in sodium chloride 0.9 % 100 mL MBP (0 mg Intravenous Stopped 3/24/25 1310)   ondansetron  (ZOFRAN) injection 4 mg (4 mg Intravenous Given 3/24/25 1302)   iopamidol (ISOVUE-370) 76 % injection 100 mL (100 mL Intravenous Given 3/24/25 1251)   insulin lispro (HUMALOG/ADMELOG) injection 6 Units (6 Units Subcutaneous Given 3/24/25 1405)   LORazepam (ATIVAN) injection 1 mg (1 mg Intravenous Given 3/24/25 1420)   sodium chloride 0.9 % bolus 710 mL (710 mL Intravenous New Bag 3/24/25 1523)     CT Abdomen Pelvis With Contrast  Result Date: 3/24/2025  Impression: 1.Mural thickening involving the visualized distal esophagus consistent with esophagitis. 2.Cholelithiasis with generalized gallbladder distention with no mural thickening or surrounding inflammatory change to suggest acute cholecystitis. Correlate with symptoms. 3.Chronic bilateral L5 spondylolysis without significant spondylolisthesis at L5/S1. Electronically Signed: Rigo Argueta MD  3/24/2025 12:57 PM EDT  Workstation ID: [id number]    CT Head Without Contrast  Result Date: 3/24/2025  Impression: No acute intracranial findings by CT. Electronically Signed: Elpidio Chavis MD  3/24/2025 12:54 PM EDT  Workstation ID: [id number]    XR Chest 1 View  Result Date: 3/24/2025  Impression: No acute process. Electronically Signed: Agus Johnson MD  3/24/2025 12:28 PM EDT  Workstation ID: [id number]                                                     Medical Decision Making  Patient was a for the above complaints.  IV was established and blood work was obtained to assess for electrolyte abnormalities, DKA versus HHS, infection.  White blood cell count 16.05 with lactate 3.6 and Pro-Eliecer 3.29.  Was started on broad-spectrum antibiotics with Rocephin and vancomycin.  Glucose was initially 479 was given 2 separate doses of 60 units subcu insulin with a repeat of 399 prior to admission.  Lipase 10, TSH within normal limits.  Acute kidney injury with creatinine 1.73 BUN 22 GFR 34.3.  Considered DKA however ABG shows a pH of 7.657.  Potassium was low at 3, magnesium was low  at 1.4 and phosphorus was low at 2.  Replacement protocol was ordered.  Small amount of acetone seen.  Patient was given 1 L initially of normal saline however repeat lactic was 4.8 so was given the full sepsis bolus.  Blood cultures are currently pending at this time.  Initial troponin 58 with a repeat of 57 likely due to elevated kidney function.  Respiratory panel positive for RSV.  Patient had a rectal temperature of 103 Fahrenheit however is allergic to aspirin and Tylenol.  Remains n.p.o. due to failing swallow screen so was unable to give ibuprofen.  Urinalysis shows trace bacteria.  Chest x-ray independently interpreted by radiologist as no acute process.  Head CT independently interpreted by the radiologist as no acute intracranial findings by CT.  Abdominal CT independently interpreted by the radiologist as:  1.Mural thickening involving the visualized distal esophagus consistent with esophagitis.   2.Cholelithiasis with generalized gallbladder distention with no mural thickening or surrounding inflammatory change to suggest acute cholecystitis. Correlate with symptoms.   3.Chronic bilateral L5 spondylolysis without significant spondylolisthesis at L5/S1.     Discussed case with Dr. Gruber with general surgery who states that she will come assess patient and request a HIDA scan to be ordered to assess for cholecystitis.  This was ordered.  Patient will be admitted to the hospitalist service for further evaluation and management.  Discussed with Sari ALLISON with the hospitalist group who agrees to admit patient.    Based on the clinical findings at this time I anticipate the patient will require a 2 midnight stay      Problems Addressed:  WILLIAM (acute kidney injury): complicated acute illness or injury  Altered mental status, unspecified altered mental status type: complicated acute illness or injury  Calculus of gallbladder without cholecystitis without obstruction: complicated acute illness or injury  Fever,  unspecified fever cause: complicated acute illness or injury  Hyperglycemia: complicated acute illness or injury  Hypokalemia: complicated acute illness or injury  Hypomagnesemia: complicated acute illness or injury  RSV infection: complicated acute illness or injury  Sepsis, due to unspecified organism, unspecified whether acute organ dysfunction present: complicated acute illness or injury    Amount and/or Complexity of Data Reviewed  Labs: ordered. Decision-making details documented in ED Course.  Radiology: ordered and independent interpretation performed. Decision-making details documented in ED Course.  ECG/medicine tests: ordered and independent interpretation performed. Decision-making details documented in ED Course.    Risk  OTC drugs.  Prescription drug management.  Decision regarding hospitalization.        Final diagnoses:   Altered mental status, unspecified altered mental status type   Hyperglycemia   Fever, unspecified fever cause   Sepsis, due to unspecified organism, unspecified whether acute organ dysfunction present   RSV infection   WILLIAM (acute kidney injury)   Hypokalemia   Calculus of gallbladder without cholecystitis without obstruction   Hypomagnesemia       ED Disposition  ED Disposition       ED Disposition   Decision to Admit    Condition   --    Comment   Level of Care: Telemetry [5]   Admitting Physician: LETY MYERS [[id number]]   Attending Physician: LETY MYERS [[id number]]                 No follow-up provider specified.       Medication List      No changes were made to your prescriptions during this visit.            Marycarmen Tam, APRN  03/24/25 5121

## 2025-03-24 NOTE — OUTREACH NOTE
Care Coordination    Per piotr review, no additional needs noted in the past 30 days. SW to discharge. Please re consult SW if additional needs arise.     Alesia PAUL -   Ambulatory Case Management    3/24/2025, 08:09 EDT

## 2025-03-25 ENCOUNTER — APPOINTMENT (OUTPATIENT)
Dept: CARDIOLOGY | Facility: HOSPITAL | Age: 55
End: 2025-03-25
Payer: MEDICAID

## 2025-03-25 ENCOUNTER — APPOINTMENT (OUTPATIENT)
Dept: NUCLEAR MEDICINE | Facility: HOSPITAL | Age: 55
End: 2025-03-25
Payer: MEDICAID

## 2025-03-25 ENCOUNTER — APPOINTMENT (OUTPATIENT)
Dept: ULTRASOUND IMAGING | Facility: HOSPITAL | Age: 55
End: 2025-03-25
Payer: MEDICAID

## 2025-03-25 LAB
ANION GAP SERPL CALCULATED.3IONS-SCNC: 14 MMOL/L (ref 5–15)
BASOPHILS # BLD AUTO: 0.04 10*3/MM3 (ref 0–0.2)
BASOPHILS NFR BLD AUTO: 0.2 % (ref 0–1.5)
BH CV LOWER VASCULAR LEFT COMMON FEMORAL AUGMENT: NORMAL
BH CV LOWER VASCULAR LEFT COMMON FEMORAL COMPETENT: NORMAL
BH CV LOWER VASCULAR LEFT COMMON FEMORAL COMPRESS: NORMAL
BH CV LOWER VASCULAR LEFT COMMON FEMORAL PHASIC: NORMAL
BH CV LOWER VASCULAR LEFT COMMON FEMORAL SPONT: NORMAL
BH CV LOWER VASCULAR LEFT DISTAL FEMORAL COMPRESS: NORMAL
BH CV LOWER VASCULAR LEFT GASTRONEMIUS COMPRESS: NORMAL
BH CV LOWER VASCULAR LEFT GREATER SAPH AK COMPRESS: NORMAL
BH CV LOWER VASCULAR LEFT GREATER SAPH BK COMPRESS: NORMAL
BH CV LOWER VASCULAR LEFT LESSER SAPH COMPRESS: NORMAL
BH CV LOWER VASCULAR LEFT MID FEMORAL AUGMENT: NORMAL
BH CV LOWER VASCULAR LEFT MID FEMORAL COMPETENT: NORMAL
BH CV LOWER VASCULAR LEFT MID FEMORAL COMPRESS: NORMAL
BH CV LOWER VASCULAR LEFT MID FEMORAL PHASIC: NORMAL
BH CV LOWER VASCULAR LEFT MID FEMORAL SPONT: NORMAL
BH CV LOWER VASCULAR LEFT POPLITEAL AUGMENT: NORMAL
BH CV LOWER VASCULAR LEFT POPLITEAL COMPETENT: NORMAL
BH CV LOWER VASCULAR LEFT POPLITEAL COMPRESS: NORMAL
BH CV LOWER VASCULAR LEFT POPLITEAL PHASIC: NORMAL
BH CV LOWER VASCULAR LEFT POPLITEAL SPONT: NORMAL
BH CV LOWER VASCULAR LEFT POSTERIOR TIBIAL COMPRESS: NORMAL
BH CV LOWER VASCULAR LEFT PROXIMAL FEMORAL COMPRESS: NORMAL
BH CV LOWER VASCULAR LEFT SAPHENOFEMORAL JUNCTION COMPRESS: NORMAL
BH CV LOWER VASCULAR RIGHT COMMON FEMORAL AUGMENT: NORMAL
BH CV LOWER VASCULAR RIGHT COMMON FEMORAL COMPETENT: NORMAL
BH CV LOWER VASCULAR RIGHT COMMON FEMORAL COMPRESS: NORMAL
BH CV LOWER VASCULAR RIGHT COMMON FEMORAL PHASIC: NORMAL
BH CV LOWER VASCULAR RIGHT COMMON FEMORAL SPONT: NORMAL
BH CV LOWER VASCULAR RIGHT DISTAL FEMORAL COMPRESS: NORMAL
BH CV LOWER VASCULAR RIGHT GASTRONEMIUS COMPRESS: NORMAL
BH CV LOWER VASCULAR RIGHT GREATER SAPH AK COMPRESS: NORMAL
BH CV LOWER VASCULAR RIGHT GREATER SAPH BK COMPRESS: NORMAL
BH CV LOWER VASCULAR RIGHT LESSER SAPH COMPRESS: NORMAL
BH CV LOWER VASCULAR RIGHT MID FEMORAL AUGMENT: NORMAL
BH CV LOWER VASCULAR RIGHT MID FEMORAL COMPETENT: NORMAL
BH CV LOWER VASCULAR RIGHT MID FEMORAL COMPRESS: NORMAL
BH CV LOWER VASCULAR RIGHT MID FEMORAL PHASIC: NORMAL
BH CV LOWER VASCULAR RIGHT MID FEMORAL SPONT: NORMAL
BH CV LOWER VASCULAR RIGHT POPLITEAL AUGMENT: NORMAL
BH CV LOWER VASCULAR RIGHT POPLITEAL COMPETENT: NORMAL
BH CV LOWER VASCULAR RIGHT POPLITEAL COMPRESS: NORMAL
BH CV LOWER VASCULAR RIGHT POPLITEAL PHASIC: NORMAL
BH CV LOWER VASCULAR RIGHT POPLITEAL SPONT: NORMAL
BH CV LOWER VASCULAR RIGHT POSTERIOR TIBIAL COMPRESS: NORMAL
BH CV LOWER VASCULAR RIGHT PROXIMAL FEMORAL COMPRESS: NORMAL
BH CV LOWER VASCULAR RIGHT SAPHENOFEMORAL JUNCTION COMPRESS: NORMAL
BUN SERPL-MCNC: 23 MG/DL (ref 6–20)
BUN/CREAT SERPL: 17.3 (ref 7–25)
CALCIUM SPEC-SCNC: 8.6 MG/DL (ref 8.6–10.5)
CHLORIDE SERPL-SCNC: 101 MMOL/L (ref 98–107)
CO2 SERPL-SCNC: 26 MMOL/L (ref 22–29)
CREAT SERPL-MCNC: 1.33 MG/DL (ref 0.57–1)
DEPRECATED RDW RBC AUTO: 42.2 FL (ref 37–54)
EGFRCR SERPLBLD CKD-EPI 2021: 47.1 ML/MIN/1.73
EOSINOPHIL # BLD AUTO: 0.11 10*3/MM3 (ref 0–0.4)
EOSINOPHIL NFR BLD AUTO: 0.6 % (ref 0.3–6.2)
ERYTHROCYTE [DISTWIDTH] IN BLOOD BY AUTOMATED COUNT: 13.2 % (ref 12.3–15.4)
GLUCOSE BLDC GLUCOMTR-MCNC: 218 MG/DL (ref 70–105)
GLUCOSE BLDC GLUCOMTR-MCNC: 222 MG/DL (ref 70–105)
GLUCOSE BLDC GLUCOMTR-MCNC: 250 MG/DL (ref 70–105)
GLUCOSE SERPL-MCNC: 229 MG/DL (ref 65–99)
HCT VFR BLD AUTO: 39 % (ref 34–46.6)
HGB BLD-MCNC: 12.7 G/DL (ref 12–15.9)
IMM GRANULOCYTES # BLD AUTO: 0.23 10*3/MM3 (ref 0–0.05)
IMM GRANULOCYTES NFR BLD AUTO: 1.3 % (ref 0–0.5)
LYMPHOCYTES # BLD AUTO: 1.4 10*3/MM3 (ref 0.7–3.1)
LYMPHOCYTES NFR BLD AUTO: 7.6 % (ref 19.6–45.3)
MAGNESIUM SERPL-MCNC: 2.7 MG/DL (ref 1.6–2.6)
MCH RBC QN AUTO: 28.5 PG (ref 26.6–33)
MCHC RBC AUTO-ENTMCNC: 32.6 G/DL (ref 31.5–35.7)
MCV RBC AUTO: 87.4 FL (ref 79–97)
MONOCYTES # BLD AUTO: 0.92 10*3/MM3 (ref 0.1–0.9)
MONOCYTES NFR BLD AUTO: 5 % (ref 5–12)
MRSA DNA SPEC QL NAA+PROBE: NORMAL
NEUTROPHILS NFR BLD AUTO: 15.64 10*3/MM3 (ref 1.7–7)
NEUTROPHILS NFR BLD AUTO: 85.3 % (ref 42.7–76)
NRBC BLD AUTO-RTO: 0 /100 WBC (ref 0–0.2)
PHOSPHATE SERPL-MCNC: 2.5 MG/DL (ref 2.5–4.5)
PLATELET # BLD AUTO: 148 10*3/MM3 (ref 140–450)
PMV BLD AUTO: 11.4 FL (ref 6–12)
POTASSIUM SERPL-SCNC: 3 MMOL/L (ref 3.5–5.2)
PROCALCITONIN SERPL-MCNC: 2.75 NG/ML (ref 0–0.25)
QT INTERVAL: 378 MS
QTC INTERVAL: 456 MS
RBC # BLD AUTO: 4.46 10*6/MM3 (ref 3.77–5.28)
SODIUM SERPL-SCNC: 141 MMOL/L (ref 136–145)
SODIUM UR-SCNC: <20 MMOL/L
VANCOMYCIN SERPL-MCNC: 8.58 MCG/ML (ref 5–40)
WBC NRBC COR # BLD AUTO: 18.34 10*3/MM3 (ref 3.4–10.8)

## 2025-03-25 PROCEDURE — 25810000003 SODIUM CHLORIDE 0.9 % SOLUTION: Performed by: INTERNAL MEDICINE

## 2025-03-25 PROCEDURE — 25010000002 ZIPRASIDONE MESYLATE PER 10 MG: Performed by: FAMILY MEDICINE

## 2025-03-25 PROCEDURE — 25010000002 LORAZEPAM PER 2 MG: Performed by: FAMILY MEDICINE

## 2025-03-25 PROCEDURE — 93970 EXTREMITY STUDY: CPT

## 2025-03-25 PROCEDURE — 84100 ASSAY OF PHOSPHORUS: CPT | Performed by: STUDENT IN AN ORGANIZED HEALTH CARE EDUCATION/TRAINING PROGRAM

## 2025-03-25 PROCEDURE — 25010000002 POTASSIUM CHLORIDE 10 MEQ/100ML SOLUTION: Performed by: INTERNAL MEDICINE

## 2025-03-25 PROCEDURE — 87641 MR-STAPH DNA AMP PROBE: CPT

## 2025-03-25 PROCEDURE — 25010000002 CEFTRIAXONE PER 250 MG: Performed by: INTERNAL MEDICINE

## 2025-03-25 PROCEDURE — 94799 UNLISTED PULMONARY SVC/PX: CPT

## 2025-03-25 PROCEDURE — 85025 COMPLETE CBC W/AUTO DIFF WBC: CPT

## 2025-03-25 PROCEDURE — 83735 ASSAY OF MAGNESIUM: CPT

## 2025-03-25 PROCEDURE — 25010000002 METRONIDAZOLE 500 MG/100ML SOLUTION: Performed by: INTERNAL MEDICINE

## 2025-03-25 PROCEDURE — 76705 ECHO EXAM OF ABDOMEN: CPT

## 2025-03-25 PROCEDURE — 82948 REAGENT STRIP/BLOOD GLUCOSE: CPT

## 2025-03-25 PROCEDURE — 80202 ASSAY OF VANCOMYCIN: CPT | Performed by: STUDENT IN AN ORGANIZED HEALTH CARE EDUCATION/TRAINING PROGRAM

## 2025-03-25 PROCEDURE — 93971 EXTREMITY STUDY: CPT

## 2025-03-25 PROCEDURE — 63710000001 INSULIN GLARGINE PER 5 UNITS

## 2025-03-25 PROCEDURE — 80048 BASIC METABOLIC PNL TOTAL CA: CPT

## 2025-03-25 PROCEDURE — 93970 EXTREMITY STUDY: CPT | Performed by: SURGERY

## 2025-03-25 PROCEDURE — 94761 N-INVAS EAR/PLS OXIMETRY MLT: CPT

## 2025-03-25 PROCEDURE — 25010000002 HALOPERIDOL LACTATE PER 5 MG: Performed by: FAMILY MEDICINE

## 2025-03-25 PROCEDURE — 92610 EVALUATE SWALLOWING FUNCTION: CPT

## 2025-03-25 PROCEDURE — 94640 AIRWAY INHALATION TREATMENT: CPT

## 2025-03-25 PROCEDURE — 84145 PROCALCITONIN (PCT): CPT | Performed by: INTERNAL MEDICINE

## 2025-03-25 PROCEDURE — 63710000001 INSULIN LISPRO (HUMAN) PER 5 UNITS

## 2025-03-25 RX ORDER — POTASSIUM CHLORIDE 7.45 MG/ML
10 INJECTION INTRAVENOUS
Status: COMPLETED | OUTPATIENT
Start: 2025-03-25 | End: 2025-03-25

## 2025-03-25 RX ORDER — METRONIDAZOLE 500 MG/100ML
500 INJECTION, SOLUTION INTRAVENOUS EVERY 8 HOURS
Status: DISCONTINUED | OUTPATIENT
Start: 2025-03-25 | End: 2025-03-27

## 2025-03-25 RX ORDER — HALOPERIDOL 5 MG/ML
1 INJECTION INTRAMUSCULAR ONCE
Status: COMPLETED | OUTPATIENT
Start: 2025-03-25 | End: 2025-03-25

## 2025-03-25 RX ORDER — BUDESONIDE AND FORMOTEROL FUMARATE DIHYDRATE 160; 4.5 UG/1; UG/1
2 AEROSOL RESPIRATORY (INHALATION)
Status: DISCONTINUED | OUTPATIENT
Start: 2025-03-25 | End: 2025-03-28 | Stop reason: HOSPADM

## 2025-03-25 RX ORDER — IPRATROPIUM BROMIDE AND ALBUTEROL SULFATE 2.5; .5 MG/3ML; MG/3ML
3 SOLUTION RESPIRATORY (INHALATION)
Status: DISCONTINUED | OUTPATIENT
Start: 2025-03-25 | End: 2025-03-28 | Stop reason: HOSPADM

## 2025-03-25 RX ORDER — OLANZAPINE 5 MG/1
2.5 TABLET ORAL 2 TIMES DAILY
Status: DISCONTINUED | OUTPATIENT
Start: 2025-03-25 | End: 2025-03-25

## 2025-03-25 RX ORDER — LORAZEPAM 2 MG/ML
1 INJECTION INTRAMUSCULAR ONCE
Status: COMPLETED | OUTPATIENT
Start: 2025-03-26 | End: 2025-03-25

## 2025-03-25 RX ORDER — OLANZAPINE 5 MG/1
2.5 TABLET, ORALLY DISINTEGRATING ORAL 2 TIMES DAILY
Status: DISCONTINUED | OUTPATIENT
Start: 2025-03-25 | End: 2025-03-26

## 2025-03-25 RX ADMIN — ZIPRASIDONE MESYLATE 10 MG: 20 INJECTION, POWDER, LYOPHILIZED, FOR SOLUTION INTRAMUSCULAR at 20:42

## 2025-03-25 RX ADMIN — PANTOPRAZOLE SODIUM 40 MG: 40 INJECTION, POWDER, LYOPHILIZED, FOR SOLUTION INTRAVENOUS at 09:20

## 2025-03-25 RX ADMIN — IPRATROPIUM BROMIDE AND ALBUTEROL SULFATE 3 ML: .5; 3 SOLUTION RESPIRATORY (INHALATION) at 15:18

## 2025-03-25 RX ADMIN — METRONIDAZOLE 500 MG: 500 INJECTION, SOLUTION INTRAVENOUS at 13:46

## 2025-03-25 RX ADMIN — PANTOPRAZOLE SODIUM 40 MG: 40 INJECTION, POWDER, LYOPHILIZED, FOR SOLUTION INTRAVENOUS at 20:42

## 2025-03-25 RX ADMIN — INSULIN LISPRO 6 UNITS: 100 INJECTION, SOLUTION INTRAVENOUS; SUBCUTANEOUS at 06:36

## 2025-03-25 RX ADMIN — POTASSIUM CHLORIDE 10 MEQ: 7.46 INJECTION, SOLUTION INTRAVENOUS at 14:24

## 2025-03-25 RX ADMIN — POTASSIUM CHLORIDE 10 MEQ: 7.46 INJECTION, SOLUTION INTRAVENOUS at 07:45

## 2025-03-25 RX ADMIN — POTASSIUM CHLORIDE 10 MEQ: 7.46 INJECTION, SOLUTION INTRAVENOUS at 13:40

## 2025-03-25 RX ADMIN — SODIUM CHLORIDE 1000 ML: 0.9 INJECTION, SOLUTION INTRAVENOUS at 16:05

## 2025-03-25 RX ADMIN — POTASSIUM CHLORIDE 10 MEQ: 7.46 INJECTION, SOLUTION INTRAVENOUS at 10:20

## 2025-03-25 RX ADMIN — METRONIDAZOLE 500 MG: 500 INJECTION, SOLUTION INTRAVENOUS at 20:42

## 2025-03-25 RX ADMIN — LORAZEPAM 1 MG: 2 INJECTION INTRAMUSCULAR; INTRAVENOUS at 23:21

## 2025-03-25 RX ADMIN — POTASSIUM CHLORIDE 10 MEQ: 7.46 INJECTION, SOLUTION INTRAVENOUS at 11:45

## 2025-03-25 RX ADMIN — CEFTRIAXONE 2000 MG: 2 INJECTION, POWDER, FOR SOLUTION INTRAMUSCULAR; INTRAVENOUS at 13:41

## 2025-03-25 RX ADMIN — INSULIN LISPRO 4 UNITS: 100 INJECTION, SOLUTION INTRAVENOUS; SUBCUTANEOUS at 13:40

## 2025-03-25 RX ADMIN — POTASSIUM CHLORIDE 10 MEQ: 7.46 INJECTION, SOLUTION INTRAVENOUS at 08:45

## 2025-03-25 RX ADMIN — IPRATROPIUM BROMIDE AND ALBUTEROL SULFATE 3 ML: .5; 3 SOLUTION RESPIRATORY (INHALATION) at 20:12

## 2025-03-25 RX ADMIN — INSULIN LISPRO 4 UNITS: 100 INJECTION, SOLUTION INTRAVENOUS; SUBCUTANEOUS at 17:59

## 2025-03-25 RX ADMIN — INSULIN GLARGINE 20 UNITS: 100 INJECTION, SOLUTION SUBCUTANEOUS at 20:41

## 2025-03-25 RX ADMIN — OLANZAPINE 2.5 MG: 5 TABLET, ORALLY DISINTEGRATING ORAL at 14:24

## 2025-03-25 RX ADMIN — HALOPERIDOL LACTATE 1 MG: 5 INJECTION, SOLUTION INTRAMUSCULAR at 22:11

## 2025-03-25 NOTE — PLAN OF CARE
Problem: Restraint, Nonviolent  Goal: Absence of Harm or Injury  3/24/2025 2240 by Rosario Vee RN  Outcome: Not Progressing  3/24/2025 2239 by Rosario Vee RN  Outcome: Not Progressing  Intervention: Implement Least Restrictive Safety Strategies  Recent Flowsheet Documentation  Taken 3/24/2025 2200 by Rosario Vee RN  Medical Device Protection: IV pole/bag removed from visual field  Diversional Activities: other (see comments)  Taken 3/24/2025 2000 by Rosario Vee RN  Medical Device Protection:   IV pole/bag removed from visual field   tubing secured  Diversional Activities: television  Taken 3/24/2025 1800 by Rosario Vee RN  Medical Device Protection: IV pole/bag removed from visual field  Diversional Activities: television  Taken 3/24/2025 1720 by Rosario Vee RN  Medical Device Protection: IV pole/bag removed from visual field  Diversional Activities: television  Taken 3/24/2025 1600 by Rosario Vee RN  Medical Device Protection: IV pole/bag removed from visual field  Intervention: Protect Dignity, Rights and Personal Wellbeing  Recent Flowsheet Documentation  Taken 3/24/2025 1720 by Rosario Vee RN  Trust Relationship/Rapport:   care explained   reassurance provided  Intervention: Protect Skin and Joint Integrity  Recent Flowsheet Documentation  Taken 3/24/2025 2000 by Rosario Vee RN  Body Position: supine  Taken 3/24/2025 1720 by Rosario Vee RN  Body Position:   turned   right   Goal Outcome Evaluation:  Plan of Care Reviewed With: patient        Progress: no change

## 2025-03-25 NOTE — PROGRESS NOTES
[facility] MEDICINE SERVICE  DAILY PROGRESS NOTE    NAME: Serina Powell  : 1970  MRN: [id number]      LOS: 1 day     PROVIDER OF SERVICE: Sarah Kinsey MD    Chief Complaint: Altered mental status    Subjective:     Interval History:  History taken from: patient    Not complaining, confused    Review of Systems:   Review of Systems   Unable to perform ROS: Mental status change   All other systems reviewed and are negative.      Objective:     Vital Signs  Temp:  [99.7 °F (37.6 °C)-102.1 °F (38.9 °C)] 101.2 °F (38.4 °C)  Heart Rate:  [78-95] 81  Resp:  [11-18] 14  BP: (115-173)/() 151/84   Body mass index is 48.79 kg/m².    Physical Exam  Physical Exam  Constitutional:       Appearance: Normal appearance.   HENT:      Head: Normocephalic and atraumatic.      Nose: Nose normal.      Mouth/Throat:      Mouth: Mucous membranes are moist.   Eyes:      Extraocular Movements: Extraocular movements intact.      Pupils: Pupils are equal, round, and reactive to light.   Cardiovascular:      Rate and Rhythm: Normal rate and regular rhythm.   Pulmonary:      Effort: Pulmonary effort is normal.      Breath sounds: Normal breath sounds.   Abdominal:      General: Abdomen is flat. Bowel sounds are normal.      Palpations: Abdomen is soft.   Musculoskeletal:      Cervical back: Normal range of motion and neck supple.      Comments: Left lower extremity swelling and erythema involving the shin and lateral aspect of the left leg   Skin:     General: Skin is warm and dry.   Neurological:      Mental Status: She is alert.      Comments: Alert, following directions, confused         Current Medications:  Scheduled Meds:budesonide-formoterol, 2 puff, Inhalation, BID - RT  cefTRIAXone, 2,000 mg, Intravenous, Q24H  insulin glargine, 20 Units, Subcutaneous, Nightly  insulin lispro, 2-9 Units, Subcutaneous, Q6H  ipratropium-albuterol, 3 mL, Nebulization, 4x Daily - RT  metroNIDAZOLE, 500 mg, Intravenous,  Q8H  [Held by provider] nebivolol, 10 mg, Oral, Daily  OLANZapine zydis, 2.5 mg, Oral, BID  pantoprazole, 40 mg, Intravenous, Q12H  potassium chloride, 10 mEq, Intravenous, Q1H      Continuous Infusions:sodium chloride, 125 mL/hr, Last Rate: 125 mL/hr (03/24/25 2333)      PRN Meds:.  albuterol    senna-docusate sodium **AND** polyethylene glycol **AND** bisacodyl **AND** bisacodyl    Calcium Replacement - Follow Nurse / BPA Driven Protocol    dextrose    dextrose    glucagon (human recombinant)    influenza vaccine    Magnesium Standard Dose Replacement - Follow Nurse / BPA Driven Protocol    Magnesium Standard Dose Replacement - Follow Nurse / BPA Driven Protocol    melatonin    ondansetron ODT **OR** ondansetron    Phosphorus Replacement - Follow Nurse / BPA Driven Protocol    Potassium Replacement - Follow Nurse / BPA Driven Protocol    sodium chloride    [COMPLETED] Insert Peripheral IV **AND** sodium chloride       Diagnostic Data    Results from last 7 days   Lab Units 03/25/25  0551 03/24/25  1134   WBC 10*3/mm3 18.34* 16.05*   HEMOGLOBIN g/dL 12.7 13.6   HEMATOCRIT % 39.0 40.0   PLATELETS 10*3/mm3 148 187   GLUCOSE mg/dL 229* 479*   CREATININE mg/dL 1.33* 1.73*   BUN mg/dL 23* 22*   SODIUM mmol/L 141 137   POTASSIUM mmol/L 3.0* 3.0*   AST (SGOT) U/L  --  65*   ALT (SGPT) U/L  --  18   ALK PHOS U/L  --  75   BILIRUBIN mg/dL  --  0.6   ANION GAP mmol/L 14.0 18.1*       CT Abdomen Pelvis With Contrast  Result Date: 3/24/2025  Impression: 1.Mural thickening involving the visualized distal esophagus consistent with esophagitis. 2.Cholelithiasis with generalized gallbladder distention with no mural thickening or surrounding inflammatory change to suggest acute cholecystitis. Correlate with symptoms. 3.Chronic bilateral L5 spondylolysis without significant spondylolisthesis at L5/S1. Electronically Signed: Rigo Argueta MD  3/24/2025 12:57 PM EDT  Workstation ID: [id number]    CT Head Without Contrast  Result Date:  3/24/2025  Impression: No acute intracranial findings by CT. Electronically Signed: Elpidio Chavis MD  3/24/2025 12:54 PM EDT  Workstation ID: [id number]    XR Chest 1 View  Result Date: 3/24/2025  Impression: No acute process. Electronically Signed: Agus Johnson MD  3/24/2025 12:28 PM EDT  Workstation ID: [id number]        I reviewed the patient's new clinical results.    Assessment/Plan:     Active and Resolved Problems  Active Hospital Problems    Diagnosis  POA    **Altered mental status [R41.82]  Yes      Resolved Hospital Problems   No resolved problems to display.       Left lower extremity cellulitis  Sepsis  AMS with agitation  Cholelithiasis with gallbladder wall distention  Esophagitis  RSV infection  WILLIAM  Metabolic acidosis  Hypokalemia  Hypomagnesemia  Diabetes mellitus type 2  Hypertension        -Patient still having fevers.  Blood cultures negative so far.  MRSA PCR negative.  Continue IV ceftriaxone.  Continue to follow blood cultures.  Last procalcitonin was elevated at 3.29.  Monitor.  Follow-up on Dopplers ordered to rule out DVT.    -Cholelithiasis with gallbladder wall distention:-General Surgery has ordered a HIDA scan to rule out acute cholecystitis.  Add IV Flagyl since patient still having recurrent fevers with Tmax of 101.3 today and Tmax of 101.9 yesterday.  Flagyl can be discontinued once acute cholecystitis has been ruled out.    -Patient confused and in 2 point restraints due to agitation.  Start olanzapine.  Discontinue restraints.    - Continue Protonix for esophagitis demonstrated on CAT scan    - Supportive care for RSV infection.  Start droplet isolation.    - Renal function is improving.  Nephrology following.  Continue to monitor.    - Metabolic acidosis is improving.  Continue to monitor acid-base status.    - Replace potassium for hypokalemia    -Hypomagnesemia is improved    - Continue current insulin regimen, monitor the blood glucose trend with regard to diabetes mellitus.   Hemoglobin A1c is 8.06% done last month February 2025    -  Continue current blood pressure medication regimen.    - Continue current management.    VTE Prophylaxis:  Mechanical VTE prophylaxis orders are present.             Disposition Planning:     Barriers to Discharge: Pending clinical improvement  Anticipated Date of Discharge: 3/31/2025  Place of Discharge: Home versus SNF          Code Status and Medical Interventions: CPR (Attempt to Resuscitate); Full Support   Ordered at: 03/24/25 1459     Code Status (Patient has no pulse and is not breathing):    CPR (Attempt to Resuscitate)     Medical Interventions (Patient has pulse or is breathing):    Full Support       Signature: Electronically signed by Sarah Kinsey MD, 03/25/25, 12:39 EDT.  [facility] Hospitalist Team

## 2025-03-25 NOTE — PLAN OF CARE
Problem: Restraint, Nonviolent  Goal: Absence of Harm or Injury  Outcome: Progressing  Intervention: Implement Least Restrictive Safety Strategies  Recent Flowsheet Documentation  Taken 3/25/2025 1600 by Marquita Claros RN  Medical Device Protection:   torso covered   tubing secured  Taken 3/25/2025 1400 by Marquita Claros RN  Medical Device Protection:   torso covered   tubing secured  Diversional Activities: television  Taken 3/25/2025 1200 by Marquita Claros RN  Medical Device Protection:   tubing secured   torso covered  Diversional Activities: television  Taken 3/25/2025 1000 by Marquita Claros RN  Medical Device Protection:   tubing secured   torso covered   IV pole/bag removed from visual field  Taken 3/25/2025 0800 by Marquita Claros RN  Medical Device Protection:   IV pole/bag removed from visual field   tubing secured   torso covered  Intervention: Protect Dignity, Rights and Personal Wellbeing  Recent Flowsheet Documentation  Taken 3/25/2025 1200 by Marquita Claros RN  Trust Relationship/Rapport: reassurance provided  Intervention: Protect Skin and Joint Integrity  Recent Flowsheet Documentation  Taken 3/25/2025 1600 by Marquita Claros RN  Body Position: position changed independently  Taken 3/25/2025 0800 by Marquita Claros RN  Body Position: position changed independently   Goal Outcome Evaluation:

## 2025-03-25 NOTE — CONSULTS
[facility] CONSULT NOTE    Referring Provider: Kyle Hill PA-C   Reason for Consultation: Acute kidney injury    Chief complaint .  Altered mental status    History of present illness: Patient is 54 years old female with history of COPD, hypertension, diabetes, GERD, admitted to the hospital on 3/24 with altered mental status.  Patient was brought to the hospital due to confusion and witnessed fall.  As per reports she had nausea, vomiting and abdominal pain intermittently for few days.  Patient was febrile on presentation.  Lab workup showed hyperglycemia and elevated creatinine and hypokalemia.  UA was positive.  Urine drug screen was positive for THC, benzodiazepine, tricyclic antidepressants.  Respiratory panel was positive for RSV.  Patient also had lactic acidosis but improved with IV hydration, restrained.    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    COPD (chronic obstructive pulmonary disease)     Depression     Diabetes mellitus     Hyperlipidemia     Hypertension     Injury of back     Obesity     Seizures     Sleep apnea     Syncope without other cardiovascular symptoms      Past Surgical History:   Procedure Laterality Date    ADENOIDECTOMY      FOOT WOUND CLOSURE Left     INCISION AND DRAINAGE OF WOUND Left 06/21/2023    Procedure: incison and draiange, left Second and third partial ray resection, applciation of wound vac;  Surgeon: KASIE Donahue DPM;  Location: [facility];  Service: Podiatry;  Laterality: Left;    LAPAROSCOPIC TUBAL LIGATION      ORIF HUMERUS FRACTURE Left 02/10/2021    Procedure: HUMERUS PROXIMAL OPEN REDUCTION INTERNAL FIXATION;  Surgeon: Julián Tinoco MD;  Location: [facility];  Service: Orthopedics;  Laterality: Left;    TONSILLECTOMY       Social History     Tobacco Use    Smoking status: Every Day     Current packs/day: 1.00     Average packs/day: 1 pack/day for 42.2 years (42.2 ttl pk-yrs)     Types: Cigarettes  "    Start date: 1983     Passive exposure: Current    Smokeless tobacco: Never   Vaping Use    Vaping status: Never Used   Substance Use Topics    Alcohol use: Never    Drug use: Never     Family History   Family history unknown: Yes       Review of Systems  ROS  Not obtainable  Objective     Vital Signs  Temp:  [99.7 °F (37.6 °C)-103 °F (39.4 °C)] 101.2 °F (38.4 °C)  Heart Rate:  [78-95] 78  Resp:  [11-20] 17  BP: (115-173)/() 150/88    No intake/output data recorded.  I/O last 3 completed shifts:  In: 600 [IV Piggyback:600]  Out: 350 [Urine:350]    Physical Exam:  Physical Exam    General Appearance: Awake, confused, restrained  Skin: warm and dry  HEENT: oral mucosa dry, nonicteric sclera  Neck: supple, no JVD  Lungs: CTA  Heart: RRR, normal S1 and S2  Abdomen: soft, nontender, nondistended  : no palpable bladder  Extremities: no edema, cyanosis or clubbing    Results Review:   I reviewed the patient's new clinical results.    Lab Results   Component Value Date    CALCIUM 8.6 03/25/2025    PHOS 2.0 (L) 03/24/2025     Results from last 7 days   Lab Units 03/25/25  0551 03/24/25  1134   MAGNESIUM mg/dL 2.7* 1.4*   SODIUM mmol/L 141 137   POTASSIUM mmol/L 3.0* 3.0*   CHLORIDE mmol/L 101 89*   CO2 mmol/L 26.0 29.9*   BUN mg/dL 23* 22*   CREATININE mg/dL 1.33* 1.73*   GLUCOSE mg/dL 229* 479*   CALCIUM mg/dL 8.6 10.0   WBC 10*3/mm3 18.34* 16.05*   HEMOGLOBIN g/dL 12.7 13.6   PLATELETS 10*3/mm3 148 187   ALT (SGPT) U/L  --  18   AST (SGOT) U/L  --  65*     Lab Results   Component Value Date    CKTOTAL 112 07/31/2023    TROPONINT 57 (C) 03/24/2025     Estimated Creatinine Clearance: 65.2 mL/min (A) (by C-G formula based on SCr of 1.33 mg/dL (H)).No results found for: \"URICACID\"    Brief Urine Lab Results  (Last result in the past 365 days)        Color   Clarity   Blood   Leuk Est   Nitrite   Protein   CREAT   Urine HCG        03/24/25 1142 Yellow   Cloudy  Comment: Result checked     Large (3+)   Negative   " Negative   >=300 mg/dL (3+)                   Prior to Admission medications    Medication Sig Start Date End Date Taking? Authorizing Provider   albuterol (PROVENTIL) (2.5 MG/3ML) 0.083% nebulizer solution Take 2.5 mg by nebulization Every 4 (Four) Hours As Needed for Wheezing for up to 90 days. Indications: Spasm of Lung Air Passages 2/14/25 5/15/25  Barbara Couch APRN   Doxepin HCl 6 MG tablet Take 1 tablet by mouth Daily. 2/14/25   Barbara Couch APRN   famotidine (PEPCID) 20 MG tablet Take 1 tablet by mouth 2 (Two) Times a Day. 2/14/25   Barbara Couch APRN   fluticasone-salmeterol (ADVAIR HFA) 45-21 MCG/ACT inhaler Inhale 2 puffs 2 (Two) Times a Day. Indications: Asthma 2/14/25   Barbara Couch APRN   Glucagon (Gvoke HypoPen 2-Pack) 1 MG/0.2ML solution auto-injector Inject 1 mg under the skin into the appropriate area as directed 1 (One) Time As Needed (hypoglycemia) for up to 1 dose. 2/14/25   Barbara Couch APRN   hydroCHLOROthiazide 25 MG tablet Take 1 tablet by mouth Daily for 180 days. Indications: Edema 2/14/25 8/13/25  Barbara Couch APRN   hydrOXYzine (ATARAX) 25 MG tablet Take one tablet my mouth twice a day as needed for anxiety  Indications: Feeling Anxious 2/14/25   Barbara Couch APRN   Insulin Glargine (LANTUS SOLOSTAR) 100 UNIT/ML injection pen Inject 20 Units under the skin into the appropriate area as directed Every Night. 2/14/25   Barbara Couch APRN   Insulin Lispro (ADMELOG SOLOSTAR) 100 UNIT/ML injection pen Inject 11 Units under the skin into the appropriate area as directed 3 (Three) Times a Day With Meals. Indications: Type 2 Diabetes 2/14/25   Babrara Couch APRN   metFORMIN (GLUCOPHAGE) 1000 MG tablet Take 1 tablet by mouth Every 12 (Twelve) Hours. Indications: Type 2 Diabetes 2/14/25   Barbara Couch APRN   nebivolol (BYSTOLIC) 10 MG tablet Take 1 tablet by mouth Daily. Indications: High Blood Pressure 2/14/25   Barbara Couch APRN   QUEtiapine  (SEROquel) 200 MG tablet Take 1 tablet by mouth every night at bedtime. 2/14/25   Barbara Couhc APRN   QUEtiapine (SEROquel) 25 MG tablet Take 1 tablet by mouth Every Night. Indications: Major Depressive Disorder 2/14/25   Barbara Couch APRN       cefTRIAXone, 2,000 mg, Intravenous, Q24H  insulin glargine, 20 Units, Subcutaneous, Nightly  insulin lispro, 2-9 Units, Subcutaneous, Q6H  [Held by provider] nebivolol, 10 mg, Oral, Daily  pantoprazole, 40 mg, Intravenous, Q12H  potassium chloride, 10 mEq, Intravenous, Q1H      Pharmacy to dose vancomycin,   sodium chloride, 125 mL/hr, Last Rate: 125 mL/hr (03/24/25 1547)        Assessment & Plan       Acute kidney injury.  Most likely prerenal in etiology.  Patient received IV fluid bolus in ER and now on maintenance IV hydration with normal saline.  Creatinine already improving, 1.3 this morning from peak of 1.73.  Still looks dry clinically  Hypokalemia  RSV infection  Altered mental status.  Seems to be due to metabolic encephalopathy  Alkalosis.  Metabolic and respiratory  Proteinuria.  May have underlying diabetic glomerulosclerosis  Hypovolemia    Plan:  Keep off diuretics and antihypertensives for now  Continue IV hydration with normal saline  IV potassium replacement  Follow cultures  Surveillance labs    I discussed the patients findings and my recommendations with patient and nursing staff    Tyson Villanueva MD  03/25/25  10:26 EDT

## 2025-03-25 NOTE — CASE MANAGEMENT/SOCIAL WORK
Case Management/Social Work    Patient Name:  Serina Powell  YOB: 1970  MRN: [id number]  Admit Date:  3/24/2025      Patient noted to be disoriented x4 at this time. Unable to answer social determinants of health appropriately at this time.       03/25/25 1210   Living Situation   Current Living Arrangements apartment   Potentially Unsafe Housing Conditions patient unable to answer   Food Insecurity   Within the past 12 months, you worried that your food would run out before you got the money to buy more. Pt Unable   Within the past 12 months, the food you bought just didn't last and you didn't have money to get more. Pt Unable   Transportation Needs   In the past 12 months, has lack of transportation kept you from medical appointments or from getting medications? Pt Unable   In the past 12 months, has lack of transportation kept you from meetings, work, or from getting things needed for daily living? Pt Unable   Utilities   In the past 12 months has the electric, gas, oil, or water company threatened to shut off services in your home? Pt Unable   Abuse Screen (yes response referral indicated)   Feels Unsafe at Home or Work/School unable to answer (comment required)   Feels Threatened by Someone unable to answer (comment required)   Does Anyone Try to Keep You From Having Contact with Others or Doing Things Outside Your Home? unable to answer (comment required)   Physical Signs of Abuse Present patient unable to answer   Financial Resource Strain   How hard is it for you to pay for the very basics like food, housing, medical care, and heating? Pt Unable   Employment   Do you want help finding or keeping work or a job? Patient unable to answer   Family and Community Support   If for any reason you need help with day-to-day activities such as bathing, preparing meals, shopping, managing finances, etc., do you get the help you need? Patient unable to answer   How often do you feel lonely or isolated  from those around you? Patient unable to answer   Education   Preferred Language patient unable to answer   Do you want help with school or training? For example, starting or completing job training or getting a high school diploma, GED or equivalent Patient unable to answer   Physical Activity   On average, how many days per week do you engage in moderate to strenuous exercise (like a brisk walk)? Pt Unable   On average, how many minutes do you engage in exercise at this level? Pt Unable   Alcohol Use   Q1: How often do you have a drink containing alcohol? Pt Unable   Q2: How many drinks containing alcohol do you have on a typical day when you are drinking? Pt Unable   Q3: How often do you have six or more drinks on one occasion? Pt Unable   Stress   Do you feel stress - tense, restless, nervous, or anxious, or unable to sleep at night because your mind is troubled all the time - these days? Pt Unable   Mental Health   Little interest or pleasure in doing things   (Unable to answer)   Feeling down, depressed, or hopeless   (Unable to answer)   Why did the patient not complete the Depression Screen questions? Patient unable to answer   Disabilities   Difficulty Concentrating, Remembering or Making Decisions patient unable to answer   Difficulty Managing Errands Independently patient unable to answer       Yola Gaona RN     Office Phone: [phone]  Office Cell: [phone]

## 2025-03-25 NOTE — PROGRESS NOTES
Chart reviewed.  Notified that patient unable to lay still for HIDA scan.  Ordered ultrasound of the gallbladder for further evaluation.  No plans for any surgical intervention as at this point is not convincing for cholecystitis.  Will review ultrasound once completed.

## 2025-03-25 NOTE — THERAPY EVALUATION
Acute Care - Speech Language Pathology   Swallow Initial Evaluation  Abhijeet     Patient Name: Serina Powell  : 1970  MRN: [id number]  Today's Date: 3/25/2025               Admit Date: 3/24/2025    Visit Dx:     ICD-10-CM ICD-9-CM   1. Altered mental status, unspecified altered mental status type  R41.82 780.97   2. Hyperglycemia  R73.9 790.29   3. Fever, unspecified fever cause  R50.9 780.60   4. Sepsis, due to unspecified organism, unspecified whether acute organ dysfunction present  A41.9 038.9     995.91   5. RSV infection  B33.8 079.6   6. WILLIAM (acute kidney injury)  N17.9 584.9   7. Hypokalemia  E87.6 276.8   8. Calculus of gallbladder without cholecystitis without obstruction  K80.20 574.20   9. 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    Diabetic peripheral neuropathy associated with type 2 diabetes mellitus    Acute osteomyelitis of left foot    Obesity    Healthcare maintenance    Polycythemia    Seizures    Tobacco dependence syndrome    Verruca vulgaris    Other dorsalgia    Mental health disorder    Bipolar 1 disorder    Personal history of tobacco use, presenting hazards to health    Ear pain, bilateral    Altered mental status     Past Medical History:   Diagnosis Date    Anaclitic depression 2013    COPD (chronic obstructive pulmonary disease)     Depression     Diabetes mellitus     Hyperlipidemia     Hypertension     Injury of back     Obesity     Seizures     Sleep apnea     Syncope without other cardiovascular symptoms      Past Surgical History:   Procedure Laterality Date    ADENOIDECTOMY      FOOT WOUND CLOSURE  Left     INCISION AND DRAINAGE OF WOUND Left 06/21/2023    Procedure: incison and draiange, left Second and third partial ray resection, applciation of wound vac;  Surgeon: KASIE Donahue DPM;  Location: [facility] MAIN OR;  Service: Podiatry;  Laterality: Left;    LAPAROSCOPIC TUBAL LIGATION      ORIF HUMERUS FRACTURE Left 02/10/2021    Procedure: HUMERUS PROXIMAL OPEN REDUCTION INTERNAL FIXATION;  Surgeon: Julián Tinoco MD;  Location: [facility] MAIN OR;  Service: Orthopedics;  Laterality: Left;    TONSILLECTOMY         SLP Recommendation and Plan     SLP Diet Recommendation: puree, thin liquids (03/25/25 1500)  Recommended Precautions and Strategies: upright posture during/after eating, small bites of food and sips of liquid, multiple swallows per bite of food, multiple swallows per sip of liquid, alternate between small bites of food and sips of liquid (03/25/25 1500)  SLP Rec. for Method of Medication Administration: meds crushed, meds whole, with thin liquids, with puree, as tolerated (03/25/25 1500)     Monitor for Signs of Aspiration: yes, notify SLP if any concerns (03/25/25 1500)  Recommended Diagnostics: reassess via clinical swallow evaluation (03/25/25 1500)  Swallow Criteria for Skilled Therapeutic Interventions Met: demonstrates skilled criteria (03/25/25 1500)     Rehab Potential/Prognosis, Swallowing: good, to achieve stated therapy goals (03/25/25 1500)  Therapy Frequency (Swallow): 3 days per week, 4 days per week (03/25/25 1500)  Predicted Duration Therapy Intervention (Days): until discharge (03/25/25 1500)  Oral Care Recommendations: Oral Care BID/PRN, Oral Care before breakfast, after meals and PRN (03/25/25 1500)    SWALLOW EVALUATION (Last 72 Hours)       SLP Adult Swallow Evaluation       Row Name 03/25/25 1500       Rehab Evaluation    Document Type evaluation  -PF    Subjective Information no complaints  -PF    Patient Observations alert;cooperative;lethargic  -PF    Patient Effort good  -PF  "   Symptoms Noted During/After Treatment none  -PF       General Information    Patient Profile Reviewed yes  -PF    Pertinent History Of Current Problem Per H&P: \"54-year-old female with history of diabetes, hypertension, seizures, COPD presents the ED today due to altered mental status and hyperglycemia along with ongoing nausea and vomiting that started last night.  Patient lives with her daughter and EMS reports that the daughter noticed the patient was confused and somehow fell hitting her head on the coffee table last night. Daughter at bedside reports that patient became confused at 1 AM and that she did fall last night.  Daughter reports patient has been complaining of intermittent abdominal pain for 2 weeks and has had intermittent nausea vomiting for the last week.  Reports her blood sugars usually run around 200 she has been taking her insulin as prescribed\" Received orders for dysphagia evaluation. -PF    Current Method of Nutrition NPO  -PF    Precautions/Limitations, Vision WFL;for purposes of eval  -PF    Precautions/Limitations, Hearing WFL;for purposes of eval  -PF    Prior Level of Function-Communication WFL  -PF    Prior Level of Function-Swallowing unknown  -PF    Plans/Goals Discussed with patient  -PF       Oral Motor Structure and Function    Dentition Assessment edentulous;edentulous, does not have dentures  -PF    Secretion Management WNL/WFL  -PF    Mucosal Quality dry  -PF       General Eating/Swallowing Observations    Respiratory Support Currently in Use room air  -PF    Eating/Swallowing Skills fed by SLP  d/t soft restraints in place  -PF    Positioning During Eating upright 90 degree;upright in bed  -PF    Utensils Used spoon;cup;straw  -PF    Consistencies Trialed ice chips;thin liquids;pureed  -PF       Clinical Swallow Eval    Clinical Swallow Evaluation Summary Clinical swallow evaluation completed. Pt was alert, lethargic, amenable to PO trials. Trials included ice chips x3, thin " by spoon x2, thin by cup x2, and thin by straw x4. [facility] generalized oral motor weakness and tremulous oral motor movements. Pt also presents w/ tremulous speech. Pt observed w/ edentulous state. Pt was fed all trials by SLP d/t soft restraints in place. No difficulty using cup, straw, or spoon. No labial spillage occurred. No oral residual noted following puree. Puree and thin appears unremarkable. There was no pocketing or oral holding. Digital palpation suggests timely swallow once initiated. After the swallow, pt had clear vocal quality w/ no cough, throat clear, or any sign of respiratory distress. Per above, recommend pt initiate puree and thin liquids w/ full feed assistance d/t soft restraints in place. ST will continue to follow to follow while in-house to ensure tolerance of PO diet, and provide further recs as indicated.      Recommendations:    - Puree and thin liquids w/ full feed assistance d/t soft restraints in place   - Meds: whole in thin liquid or whole/crushed in puree or as tolerated   - Safe swallow compensations: upright when eating/drinking, small sips/bites, slow rate of intake, alternate liquids/solids  - ST will continue to follow to ensure tolerance of current diet with further recommendations as indicated and appropriate  - Discussed with patient, family, and nurse. All in agreement with plan -PF       SLP Evaluation Clinical Impression    Functional Impact no impact on function  -PF    Rehab Potential/Prognosis, Swallowing good, to achieve stated therapy goals  -PF    Swallow Criteria for Skilled Therapeutic Interventions Met demonstrates skilled criteria  -PF       Recommendations    Therapy Frequency (Swallow) 3 days per week;4 days per week  -PF    Predicted Duration Therapy Intervention (Days) until discharge  -PF    SLP Diet Recommendation puree;thin liquids  -PF    Recommended Diagnostics reassess via clinical swallow evaluation  -PF    Recommended Precautions  and Strategies upright posture during/after eating;small bites of food and sips of liquid;multiple swallows per bite of food;multiple swallows per sip of liquid;alternate between small bites of food and sips of liquid  -PF    Oral Care Recommendations Oral Care BID/PRN;Oral Care before breakfast, after meals and PRN  -PF    SLP Rec. for Method of Medication Administration meds crushed;meds whole;with thin liquids;with puree;as tolerated  -PF    Monitor for Signs of Aspiration yes;notify SLP if any concerns  -PF       Swallow Goals (SLP)    Swallow LTGs Swallow Long Term Goal (free text)  -PF    Swallow STGs diet tolerance goal selection (SLP)  -PF    Diet Tolerance Goal Selection (SLP) Swallow Short Term Goal 1;Swallow Short Term Goal 2  -PF       (LTG) Swallow    (LTG) Swallow Pt will maximize swallow function for least restrictive PO diet, exhibiting no complication associated with dysphagia, adequate PO intake, and demonstrating independent use of swallow compensation.  -PF    [place] (Swallow Long Term Goal) with minimal cues (75-90% accuracy)  -PF    Time Frame (Swallow Long Term Goal) by discharge  -PF    Progress/Outcomes (Swallow Long Term Goal) new goal  -PF       (STG) Swallow 1    (STG) Swallow 1 Patient will participate in ongoing assessment of swallow, including reevaluation clinically and/or including instrumental assessment of swallow if indicated, to further assess swallow function .  -PF    [place] (Swallow Short Term Goal 1) with minimal cues (75-90% accuracy)  -PF    Time Frame (Swallow Short Term Goal 1) 1 week  -PF    Progress/Outcomes (Swallow Short Term Goal 1) new goal  -PF       (STG) Swallow 2    (STG) Swallow 2 The patient will participate in a full meal assessment to determine safety and adequacy of recommended diet, independent use of safe swallow compensations, and additional goals/recommendations to follow.  -PF    [place] (Swallow Short Term Goal 2) with minimal cues  (75-90% accuracy)  -PF    Time Frame (Swallow Short Term Goal 2) 1 week  -PF    Progress/Outcomes (Swallow Short Term Goal 2) new goal  -PF              User Key  (r) = Recorded By, (t) = Taken By, (c) = Cosigned By      Initials Name Effective Dates    PF Veronica Loya SLP 05/08/23 -                     EDUCATION  The patient has been educated in the following areas:   Dysphagia (Swallowing Impairment) Oral Care/Hydration Modified Diet Instruction.        SLP GOALS       Row Name 03/25/25 1500       (LTG) Swallow    (LTG) Swallow Pt will maximize swallow function for least restrictive PO diet, exhibiting no complication associated with dysphagia, adequate PO intake, and demonstrating independent use of swallow compensation.  -PF    [place] (Swallow Long Term Goal) with minimal cues (75-90% accuracy)  -PF    Time Frame (Swallow Long Term Goal) by discharge  -PF    Progress/Outcomes (Swallow Long Term Goal) new goal  -PF       (STG) Swallow 1    (STG) Swallow 1 Patient will participate in ongoing assessment of swallow, including reevaluation clinically and/or including instrumental assessment of swallow if indicated, to further assess swallow function .  -PF    [place] (Swallow Short Term Goal 1) with minimal cues (75-90% accuracy)  -PF    Time Frame (Swallow Short Term Goal 1) 1 week  -PF    Progress/Outcomes (Swallow Short Term Goal 1) new goal  -PF       (STG) Swallow 2    (STG) Swallow 2 The patient will participate in a full meal assessment to determine safety and adequacy of recommended diet, independent use of safe swallow compensations, and additional goals/recommendations to follow.  -PF    [place] (Swallow Short Term Goal 2) with minimal cues (75-90% accuracy)  -PF    Time Frame (Swallow Short Term Goal 2) 1 week  -PF    Progress/Outcomes (Swallow Short Term Goal 2) new goal  -PF              User Key  (r) = Recorded By, (t) = Taken By, (c) = Cosigned By      Initials Name Provider Type     Veronica Varela, SLP Speech and Language Pathologist                         Time Calculation:                HAO Umanzor  3/25/2025

## 2025-03-25 NOTE — PLAN OF CARE
Problem: Restraint, Nonviolent  Goal: Absence of Harm or Injury  Outcome: Not Progressing  Intervention: Implement Least Restrictive Safety Strategies  Recent Flowsheet Documentation  Taken 3/24/2025 2200 by Roasrio Vee RN  Medical Device Protection: IV pole/bag removed from visual field  Diversional Activities: other (see comments)  Taken 3/24/2025 2000 by Rosario Vee RN  Medical Device Protection:   IV pole/bag removed from visual field   tubing secured  Diversional Activities: television  Taken 3/24/2025 1800 by Rosario Vee RN  Medical Device Protection: IV pole/bag removed from visual field  Diversional Activities: television  Taken 3/24/2025 1720 by Rosario Vee RN  Medical Device Protection: IV pole/bag removed from visual field  Diversional Activities: television  Taken 3/24/2025 1600 by Rosario Vee RN  Medical Device Protection: IV pole/bag removed from visual field  Intervention: Protect Dignity, Rights and Personal Wellbeing  Recent Flowsheet Documentation  Taken 3/24/2025 1720 by Rosario Vee RN  Trust Relationship/Rapport:   care explained   reassurance provided  Intervention: Protect Skin and Joint Integrity  Recent Flowsheet Documentation  Taken 3/24/2025 2000 by Rosario Vee RN  Body Position: supine  Taken 3/24/2025 1720 by Rosario Vee RN  Body Position:   turned   right   Goal Outcome Evaluation:  Plan of Care Reviewed With: patient        Progress: no change

## 2025-03-25 NOTE — PLAN OF CARE
Goal Outcome Evaluation:    Clinical swallow evaluation completed. Pt was alert, lethargic, amenable to PO trials. Trials included ice chips x3, thin by spoon x2, thin by cup x2, and thin by straw x4. Oral mech exam revealed generalized oral motor weakness and tremulous oral motor movements. Pt also presents w/ tremulous speech. Pt observed w/ edentulous state. Pt was fed all trials by SLP d/t soft restraints in place. No difficulty using cup, straw, or spoon. No labial spillage occurred. No oral residual noted following puree. Puree and thin appears unremarkable. There was no pocketing or oral holding. Digital palpation suggests timely swallow once initiated. After the swallow, pt had clear vocal quality w/ no cough, throat clear, or any sign of respiratory distress. Per above, recommend pt initiate puree and thin liquids w/ full feed assistance d/t soft restraints in place. ST will continue to follow to follow while in-house to ensure tolerance of PO diet, and provide further recs as indicated.      Recommendations:    - Puree and thin liquids w/ full feed assistance d/t soft restraints in place   - Meds: whole in thin liquid or whole/crushed in puree or as tolerated   - Safe swallow compensations: upright when eating/drinking, small sips/bites, slow rate of intake, alternate liquids/solids  - ST will continue to follow to ensure tolerance of current diet with further recommendations as indicated and appropriate  - Discussed with patient, family, and nurse. All in agreement with plan -PF

## 2025-03-25 NOTE — PAYOR COMM NOTE
"This is inpatient admit submission for Serina Powell.  IP admit 3/24/25.  ER admit.     AUTHORIZATION PENDING:   PLEASE FAX OR CALL DETERMINATION TO CONTACT BELOW:   THANK YOU.      Trudy Fisher RN, CCM  Utilization Nurse  [facility]   [address] [place] [address]   [phone]  Fx [phone]     Serina Powell (54 y.o. Female)       Date of Birth   1970    Social Security Number       Address   [address] [place] [address] [address] [place] [id number]    Home Phone       MRN   [id number]       [facility]ianist   [facility]    Marital Status   Single                            Admission Date   3/24/2025    Admission Type   Emergency    Admitting Provider   Cullen Ascencio MD    Attending Provider   Sarah Kinsey MD    Department, Room/Bed   [facility] PROGRESS CARE, 2126/1       Discharge Date       Discharge Disposition       Discharge Destination                                 Attending Provider: Sarah Kinsey MD    Allergies: Acetaminophen, Aspirin, Codeine, Pregabalin, Topiramate, Valdecoxib, Tramadol, Naproxen Sodium, Tylenol With Codeine #3 [Acetaminophen-codeine]    Isolation: Droplet, Contact   Infection: MRSA (06/22/23), RSV (03/24/25)   Code Status: CPR    Ht: 165.1 cm (65\")   Wt: 133 kg (293 lb 3.4 oz)    Admission Cmt: None   Principal Problem: Altered mental status [R41.82]                   Active Insurance as of 3/24/2025       Primary Coverage       Payor Plan Insurance Group Employer/Plan Group    [employer] [place] - [employer] [id number]       Payor Plan Address Payor Plan Phone Number Payor Plan Fax Number Effective Dates    MAIL STOP:   12/1/2023 - None Entered    [address] [address]       [facility] [address]         Subscriber Name Subscriber Birth Date Member ID       SERINA POWELL 1970 [id number]                     Emergency Contacts        (Rel.) Home Phone Work Phone Mobile Phone    " PUSHPA THOMPSON (Mother) [phone] -- --    Vanessa Powell -- -- [phone]                 History & Physical        Kyle Hill PA-C at 25 1517       Attestation signed by Cullen Ascencio MD at [phone]    I have reviewed this documentation and agree.                      [facility] Medicine Services  History & Physical    Patient Name: Serina Powell  : 1970  MRN: [id number]  Primary Care Physician:  Barbara Couch APRN  Date of admission: 3/24/2025  Date and Time of Service: 3/24/2025 at 1517    Subjective      Chief Complaint: Altered mental status    History of Present Illness: Serina Powell is a 54 y.o. female with a CMH of COPD, HTN, GERD, type 2 diabetes, anxiety/depression who presented to [facility] on 3/24/2025 with altered mental status.  Patient is a poor historian so history will be based on chart review and ED provider.  Per reports, patient was noted to be confused and altered beginning last night with reports of unwitnessed fall.  However, leading up to confusion patient was noted to be complaining of abdominal pain, nausea and vomiting x 2 weeks.    On ED evaluation, patient was noted to be febrile with temperature 104. ABG with pH 7.65, pCO2 28.9, pO2 66.8.  CMP was pertinent for creatinine 1.73, AG 18, potassium 3.0, magnesium 1.4, blood glucose 479.  LFTs unremarkable. CBC with leukocytosis with WBC of 16.05 but otherwise unremarkable.  Initial lactic acid 3.6, repeat 4.8.  Procalcitonin was noted to be 3.29.  Serum osmolality 316.  Initial troponin was 58, repeat 57.  proBNP elevated at 2369.  UA not concerning for infection.  EtOH negative.  APAP negative, salicylate negative as well.  UDS positive for THC, TCA, benzos and buprenorphine.  Respiratory panel positive for RSV.  CXR with no acute findings.  CT head with no acute findings.  CT abdomen pelvis was also done given history of reported abdominal pain which revealed evidence of  esophagitis as well as cholelithiasis with generalized gallbladder distention, otherwise no mural thickening and surrounding inflammatory changes of gallbladder did not suggest acute cholecystitis.  Patient was given IV fluids, vancomycin and Rocephin in ED for sepsis protocol.  Patient was also given subcutaneous insulin 6 units x 2 for hyperglycemia.  General surgery was consulted in ED given CT abdomen pelvis findings.  Of note, while in ED, it was reported that patient was allergic to Tylenol and aspirin, oral ibuprofen was ordered by ED provider for fever.However patient cannot follow commands and thus unable to swallow ibuprofen at this time. Hospital service to admit for further management.      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 OR;  Service: Podiatry;  Laterality: Left;    LAPAROSCOPIC TUBAL LIGATION      ORIF HUMERUS FRACTURE Left 02/10/2021    Procedure: HUMERUS PROXIMAL OPEN REDUCTION INTERNAL FIXATION;  Surgeon: Julián Tinoco MD;  Location: [facility] OR;  Service: Orthopedics;  Laterality: Left;    TONSILLECTOMY         Family History: Family history is unknown by patient. Otherwise pertinent FHx was reviewed and not pertinent to current issue.    Social History:  reports that she has been smoking cigarettes. She started smoking  about 42 years ago. She has a 42.2 pack-year smoking history. She has been exposed to tobacco smoke. She has never used smokeless tobacco. She reports that she does not drink alcohol and does not use drugs.    Home Medications:  Prior to Admission Medications       Prescriptions Last Dose Informant Patient Reported? Taking?    albuterol (PROVENTIL) (2.5 MG/3ML) 0.083% nebulizer solution   No No    Take 2.5 mg by nebulization Every 4 (Four) Hours As Needed for Wheezing for up to 90 days. Indications: Spasm of Lung Air Passages    Doxepin HCl 6 MG tablet   No No    Take 1 tablet by mouth Daily.    famotidine (PEPCID) 20 MG tablet   No No    Take 1 tablet by mouth 2 (Two) Times a Day.    fluticasone-salmeterol (ADVAIR HFA) 45-21 MCG/ACT inhaler   No No    Inhale 2 puffs 2 (Two) Times a Day. Indications: Asthma    Glucagon (Gvoke HypoPen 2-Pack) 1 MG/0.2ML solution auto-injector   No No    Inject 1 mg under the skin into the appropriate area as directed 1 (One) Time As Needed (hypoglycemia) for up to 1 dose.    hydroCHLOROthiazide 25 MG tablet   No No    Take 1 tablet by mouth Daily for 180 days. Indications: Edema    hydrOXYzine (ATARAX) 25 MG tablet   No No    Take one tablet my mouth twice a day as needed for anxiety  Indications: Feeling Anxious    Insulin Glargine (LANTUS SOLOSTAR) 100 UNIT/ML injection pen   No No    Inject 20 Units under the skin into the appropriate area as directed Every Night.    Insulin Lispro (ADMELOG SOLOSTAR) 100 UNIT/ML injection pen   No No    Inject 11 Units under the skin into the appropriate area as directed 3 (Three) Times a Day With Meals. Indications: Type 2 Diabetes    metFORMIN (GLUCOPHAGE) 1000 MG tablet   No No    Take 1 tablet by mouth Every 12 (Twelve) Hours. Indications: Type 2 Diabetes    nebivolol (BYSTOLIC) 10 MG tablet   No No    Take 1 tablet by mouth Daily. Indications: High Blood Pressure    QUEtiapine (SEROquel) 200 MG tablet   No No    Take 1 tablet by mouth every  night at bedtime.    QUEtiapine (SEROquel) 25 MG tablet   No No    Take 1 tablet by mouth Every Night. Indications: Major Depressive Disorder              Allergies:  Allergies   Allergen Reactions    Acetaminophen Unknown - High Severity    Aspirin Hives     Per daughter and mother    Codeine Unknown - High Severity    Pregabalin Unknown - High Severity    Topiramate Unknown - High Severity    Valdecoxib Unknown - High Severity    Tramadol Swelling    Naproxen Sodium Hives    Tylenol With Codeine #3 [Acetaminophen-Codeine] Hives       Objective      Vitals:   Temp:  [100 °F (37.8 °C)-103 °F (39.4 °C)] 103 °F (39.4 °C)  Heart Rate:  [83-94] 91  Resp:  [20] 20  BP: (115-173)/() 115/75  Body mass index is 49.53 kg/m².    Physical Exam  General: 55 yo WF, Alert but confused, well nourished, no acute distress.  HENT: Normocephalic, normal hearing, dry oral mucosa, no scleral icterus.  Neck: Supple, nontender, no carotid bruits, no JVD, no LAD.  Lungs: Clear to auscultation, nonlabored respiration.  Heart: Tachycardic, no murmur, gallop or edema.  Abdomen: Soft, nontender, nondistended, + bowel sounds.  Musculoskeletal: S/p amputation second digit of left foot.  Normal range of motion and strength, no swelling.  Skin: Skin is warm, dry and pink, no rashes or lesions.  Neurologic: Does not follow commands.  Moves all extremities    Diagnostic Data:  Lab Results (last 24 hours)       Procedure Component Value Units Date/Time    High Sensitivity Troponin T 1Hr [[id number]]  (Abnormal) Collected: 03/24/25 1427    Specimen: Blood Updated: 03/24/25 1503     HS Troponin T 57 ng/L      Troponin T Numeric Delta -1 ng/L      Troponin T % Delta -2    Narrative:      High Sensitive Troponin T Reference Range:  <14.0 ng/L- Negative Female for AMI  <22.0 ng/L- Negative Male for AMI  >=14 - Abnormal Female indicating possible myocardial injury.  >=22 - Abnormal Male indicating possible myocardial injury.   Clinicians would have to  utilize clinical acumen, EKG, Troponin, and serial changes to determine if it is an Acute Myocardial Infarction or myocardial injury due to an underlying chronic condition.         STAT Lactic Acid, Reflex [[id number]]  (Abnormal) Collected: 03/24/25 1427    Specimen: Blood Updated: 03/24/25 1501     Lactate 4.8 mmol/L     POC Glucose Q1H [[id number]]  (Abnormal) Collected: 03/24/25 1442    Specimen: Blood Updated: 03/24/25 1445     Glucose 399 mg/dL      Comment: Serial Number: [id number]:  [id number]       Magnesium [[id number]]  (Abnormal) Collected: 03/24/25 1134    Specimen: Blood Updated: 03/24/25 1407     Magnesium 1.4 mg/dL     Phosphorus [[id number]]  (Abnormal) Collected: 03/24/25 1134    Specimen: Blood Updated: 03/24/25 1407     Phosphorus 2.0 mg/dL     POC Glucose Once [[id number]]  (Abnormal) Collected: 03/24/25 1321    Specimen: Blood Updated: 03/24/25 1324     Glucose 419 mg/dL      Comment: Serial Number: [id number]:  [id number]       Osmolality, Serum [[id number]]  (Abnormal) Collected: 03/24/25 1134    Specimen: Blood Updated: 03/24/25 1304     Osmolality 316 mOsm/kg     High Sensitivity Troponin T [[id number]]  (Abnormal) Collected: 03/24/25 1148    Specimen: Blood Updated: 03/24/25 1254     HS Troponin T 58 ng/L     Narrative:      High Sensitive Troponin T Reference Range:  <14.0 ng/L- Negative Female for AMI  <22.0 ng/L- Negative Male for AMI  >=14 - Abnormal Female indicating possible myocardial injury.  >=22 - Abnormal Male indicating possible myocardial injury.   Clinicians would have to utilize clinical acumen, EKG, Troponin, and serial changes to determine if it is an Acute Myocardial Infarction or myocardial injury due to an underlying chronic condition.         BNP [[id number]]  (Abnormal) Collected: 03/24/25 1148    Specimen: Blood Updated: 03/24/25 1251     proBNP 2,369.0 pg/mL     Narrative:      This assay is used as an aid in the diagnosis of individuals suspected of having  "heart failure. It can be used as an aid in the diagnosis of acute decompensated heart failure (ADHF) in patients presenting with signs and symptoms of ADHF to the emergency department (ED). In addition, NT-proBNP of <300 pg/mL indicates ADHF is not likely.    Age Range Result Interpretation  NT-proBNP Concentration (pg/mL:      <50             Positive            >450                   Gray                 300-450                    Negative             <300    50-75           Positive            >900                  Gray                300-900                  Negative            <300      >75             Positive            >1800                  Gray                300-1800                  Negative            <300    TSH Rfx On Abnormal To Free T4 [[id number]]  (Normal) Collected: 03/24/25 1148    Specimen: Blood Updated: 03/24/25 1251     TSH 0.958 uIU/mL     Procalcitonin [[id number]]  (Abnormal) Collected: 03/24/25 1148    Specimen: Blood Updated: 03/24/25 1251     Procalcitonin 3.29 ng/mL     Narrative:      As a Marker for Sepsis (Non-Neonates):    1. <0.5 ng/mL represents a low risk of severe sepsis and/or septic shock.  2. >2 ng/mL represents a high risk of severe sepsis and/or septic shock.    As a Marker for Lower Respiratory Tract Infections that require antibiotic therapy:    PCT on Admission    Antibiotic Therapy       6-12 Hrs later    >0.5                Strongly Recommended  >0.25 - <0.5        Recommended   0.1 - 0.25          Discouraged              Remeasure/reassess PCT  <0.1                Strongly Discouraged     Remeasure/reassess PCT    As 28 day mortality risk marker: \"Change in Procalcitonin Result\" (>80% or <=80%) if Day 0 (or Day 1) and Day 4 values are available. Refer to http://www.Divvyshots-pct-calculator.com    Change in PCT <=80%  A decrease of PCT levels below or equal to 80% defines a positive change in PCT test result representing a higher risk for 28-day all-cause mortality of " patients diagnosed with severe sepsis for septic shock.    Change in PCT >80%  A decrease of PCT levels of more than 80% defines a negative change in PCT result representing a lower risk for 28-day all-cause mortality of patients diagnosed with severe sepsis or septic shock.       Acetaminophen Level [[id number]]  (Normal) Collected: 03/24/25 1148    Specimen: Blood Updated: 03/24/25 1250     Acetaminophen <5.0 mcg/mL     Narrative:      Acetaminophen Therapeutic Range  5-20 ug/mL      Hours after ingestion            Toxic Value    4 Hours                           150 ug/mL    8 Hours                            70 ug/mL   12 Hours                            40 ug/mL   16 Hours                            20 ug/mL    These values apply to a single ingestion only.     Ethanol [[id number]] Collected: 03/24/25 1148    Specimen: Blood Updated: 03/24/25 1250     Ethanol % <0.010 %     Narrative:      Plasma Ethanol Clinical Symptoms:    ETOH (%)               Clinical Symptom  .01-.05              No apparent influence  .03-.12              Euphoria, Diminished judgment and attention   .09-.25              Impaired comprehension, Muscle incoordination  .18-.30              Confusion, Staggered gait, Slurred speech  .25-.40              Markedly decreased response to stimuli, unable to stand or                        walk, vomitting, sleep or stupor  .35-.50              Comatose, Anesthesia, Subnormal body temperature        Salicylate Level [[id number]]  (Normal) Collected: 03/24/25 1148    Specimen: Blood Updated: 03/24/25 1250     Salicylate <0.5 mg/dL     Lipase [[id number]]  (Abnormal) Collected: 03/24/25 1148    Specimen: Blood Updated: 03/24/25 1250     Lipase 10 U/L     Respiratory Panel PCR w/COVID-19(SARS-CoV-2) FR[facility]/SULY/KENYETTA/PAD/COR/LUIS ANGEL In-House, NP Swab in UTM/VTM, 2 HR TAT - Swab, Nasopharynx [[id number]]  (Abnormal) Collected: 03/24/25 1148    Specimen: Swab from Nasopharynx Updated: 03/24/25 1249     ADENOVIRUS,  PCR Not Detected     Coronavirus 229E Not Detected     Coronavirus HKU1 Not Detected     Coronavirus NL63 Not Detected     Coronavirus OC43 Not Detected     COVID19 Not Detected     Human Metapneumovirus Not Detected     Human Rhinovirus/Enterovirus Not Detected     Influenza A PCR Not Detected     Influenza B PCR Not Detected     Parainfluenza Virus 1 Not Detected     Parainfluenza Virus 2 Not Detected     Parainfluenza Virus 3 Not Detected     Parainfluenza Virus 4 Not Detected     RSV, PCR Detected     Bordetella pertussis pcr Not Detected     Bordetella parapertussis PCR Not Detected     Chlamydophila pneumoniae PCR Not Detected     Mycoplasma pneumo by PCR Not Detected    Narrative:      In the setting of a positive respiratory panel with a viral infection PLUS a negative procalcitonin without other underlying concern for bacterial infection, consider observing off antibiotics or discontinuation of antibiotics and continue supportive care. If the respiratory panel is positive for atypical bacterial infection (Bordetella pertussis, Chlamydophila pneumoniae, or Mycoplasma pneumoniae), consider antibiotic de-escalation to target atypical bacterial infection.    Protime-INR [[id number]]  (Abnormal) Collected: 03/24/25 1134    Specimen: Blood Updated: 03/24/25 1223     Protime 14.9 Seconds      INR 1.18    aPTT [[id number]]  (Normal) Collected: 03/24/25 1134    Specimen: Blood Updated: 03/24/25 1223     PTT 27.3 seconds     Urine Drug Screen - Straight Cath [[id number]]  (Abnormal) Collected: 03/24/25 1142    Specimen: Urine from Straight Cath Updated: 03/24/25 1219     THC, Screen, Urine Positive     Phencyclidine (PCP), Urine Negative     Cocaine Screen, Urine Negative     Methamphetamine, Ur Negative     Opiate Screen Negative     Amphetamine Screen, Urine Negative     Benzodiazepine Screen, Urine Positive     Tricyclic Antidepressants Screen Positive     Methadone Screen, Urine Negative     Barbiturates Screen,  Urine Negative     Oxycodone Screen, Urine Negative     Buprenorphine, Screen, Urine Positive    Narrative:      Cutoff For Drugs Screened:    Amphetamines               500 ng/ml  Barbiturates               200 ng/ml  Benzodiazepines            150 ng/ml  Cocaine                    150 ng/ml  Methadone                  200 ng/ml  Opiates                    100 ng/ml  Phencyclidine               25 ng/ml  THC                         50 ng/ml  Methamphetamine            500 ng/ml  Tricyclic Antidepressants  300 ng/ml  Oxycodone                  100 ng/ml  Buprenorphine               10 ng/ml    The normal value for all drugs tested is negative. This report includes unconfirmed screening results, with the cutoff values listed, to be used for medical treatment purposes only.  Unconfirmed results must not be used for non-medical purposes such as employment or legal testing.  Clinical consideration should be applied to any drug of abuse test, particularly when unconfirmed results are used.    All urine drugs of abuse requests without chain of custody are for medical screening purposes only.  False positives are possible.      Urinalysis, Microscopic Only - Straight Cath [[id number]]  (Abnormal) Collected: 03/24/25 1142    Specimen: Urine from Straight Cath Updated: 03/24/25 1219     RBC, UA 0-2 /HPF      WBC, UA None Seen /HPF      Comment: Urine culture not indicated.        Bacteria, UA Trace /HPF      Squamous Epithelial Cells, UA 3-6 /HPF      Yeast, UA Small/1+ Budding Yeast /HPF      Hyaline Casts, UA None Seen /LPF      Amorphous Urate Crystals, UA Moderate/2+ /HPF      Methodology Manual Light Microscopy    Comprehensive Metabolic Panel [[id number]]  (Abnormal) Collected: 03/24/25 1134    Specimen: Blood Updated: 03/24/25 1216     Glucose 479 mg/dL      BUN 22 mg/dL      Creatinine 1.73 mg/dL      Sodium 137 mmol/L      Potassium 3.0 mmol/L      Chloride 89 mmol/L      CO2 29.9 mmol/L      Calcium 10.0 mg/dL       Total Protein 7.6 g/dL      Albumin 4.0 g/dL      ALT (SGPT) 18 U/L      AST (SGOT) 65 U/L      Alkaline Phosphatase 75 U/L      Total Bilirubin 0.6 mg/dL      Globulin 3.6 gm/dL      A/G Ratio 1.1 g/dL      BUN/Creatinine Ratio 12.7     Anion Gap 18.1 mmol/L      eGFR 34.3 mL/min/1.73     Narrative:      GFR Categories in Chronic Kidney Disease (CKD)      GFR Category          GFR (mL/min/1.73)    Interpretation  G1                     90 or greater         Normal or high (1)  G2                      60-89                Mild decrease (1)  G3a                   45-59                Mild to moderate decrease  G3b                   30-44                Moderate to severe decrease  G4                    15-29                Severe decrease  G5                    14 or less           Kidney failure          (1)In the absence of evidence of kidney disease, neither GFR category G1 or G2 fulfill the criteria for CKD.    eGFR calculation 2021 CKD-EPI creatinine equation, which does not include race as a factor    Ketone Bodies, Serum (Not performed at [facility]) [[id number]]  (Abnormal) Collected: 03/24/25 1134    Specimen: Blood Updated: 03/24/25 1214    Narrative:      The following orders were created for panel order Ketone Bodies, Serum (Not performed at [facility]).  Procedure                               Abnormality         Status                     ---------                               -----------         ------                     Acetone[[id number]]                      Abnormal            Final result                 Please view results for these tests on the individual orders.    Acetone [[id number]]  (Abnormal) Collected: 03/24/25 1134    Specimen: Blood Updated: 03/24/25 1214     Acetone Small    Blood Gas, Arterial - [[id number]]  (Abnormal) Collected: 03/24/25 1147    Specimen: Arterial Blood Updated: 03/24/25 1207     Site Left Radial     Ehrnán's Test Positive     pH, Arterial 7.657 pH units      pCO2,  Arterial 28.9 mm Hg      pO2, Arterial 66.8 mm Hg      HCO3, Arterial 32.4 mmol/L      Base Excess, Arterial 11.9 mmol/L      Comment: Serial Number: [id number]:  [id number]        O2 Saturation, Arterial 96.6 %      CO2 Content 33.2 mmol/L      Barometric Pressure for Blood Gas --     Comment: N/A        Modality Room Air     FIO2 21 %      Notified Who --     Read Back --     Notified Time --     Hemodilution No     PO2/FIO2 318    Urinalysis With Culture If Indicated - Straight Cath [[id number]]  (Abnormal) Collected: 03/24/25 1142    Specimen: Urine from Straight Cath Updated: 03/24/25 1158     Color, UA Yellow     Appearance, UA Cloudy     Comment: Result checked          pH, UA 5.5     Specific Gravity, UA 1.036     Glucose, UA >=1000 mg/dL (3+)     Ketones, UA Trace     Bilirubin, UA Negative     Blood, UA Large (3+)     Protein, UA >=300 mg/dL (3+)     Leuk Esterase, UA Negative     Nitrite, UA Negative     Urobilinogen, UA 1.0 E.U./dL    Narrative:      In absence of clinical symptoms, the presence of pyuria, bacteria, and/or nitrites on the urinalysis result does not correlate with infection.    Blood Culture - Blood, Arm, Right [[id number]] Collected: 03/24/25 1148    Specimen: Blood from Arm, Right Updated: 03/24/25 1154    CBC & Differential [[id number]]  (Abnormal) Collected: 03/24/25 1134    Specimen: Blood Updated: 03/24/25 1150    Narrative:      The following orders were created for panel order CBC & Differential.  Procedure                               Abnormality         Status                     ---------                               -----------         ------                     CBC Auto Differential[[id number]]        Abnormal            Final result                 Please view results for these tests on the individual orders.    CBC Auto Differential [[id number]]  (Abnormal) Collected: 03/24/25 1134    Specimen: Blood Updated: 03/24/25 1150     WBC 16.05 10*3/mm3      RBC 4.78 10*6/mm3       Hemoglobin 13.6 g/dL      Hematocrit 40.0 %      MCV 83.7 fL      MCH 28.5 pg      MCHC 34.0 g/dL      RDW 12.7 %      RDW-SD 38.8 fl      MPV 11.9 fL      Platelets 187 10*3/mm3      Neutrophil % 89.0 %      Lymphocyte % 4.5 %      Monocyte % 4.5 %      Eosinophil % 0.1 %      Basophil % 0.2 %      Immature Grans % 1.7 %      Neutrophils, Absolute 14.29 10*3/mm3      Lymphocytes, Absolute 0.73 10*3/mm3      Monocytes, Absolute 0.72 10*3/mm3      Eosinophils, Absolute 0.01 10*3/mm3      Basophils, Absolute 0.03 10*3/mm3      Immature Grans, Absolute 0.27 10*3/mm3      nRBC 0.0 /100 WBC     [facility] Draw [[id number]] Collected: 03/24/25 1134    Specimen: Blood Updated: 03/24/25 1145    Narrative:      The following orders were created for panel order [facility] Draw.  Procedure                               Abnormality         Status                     ---------                               -----------         ------                     Green Top (Gel)[[id number]]                                  Final result               Lavender Top[[id number]]                                     Final result               Gold Top - SST[[id number]]                                   Final result               Light Blue Top[[id number]]                                   Final result                 Please view results for these tests on the individual orders.    Green Top (Gel) [[id number]] Collected: 03/24/25 1134    Specimen: Blood Updated: 03/24/25 1145     Extra Tube Hold for add-ons.     Comment: Auto resulted.       Lavender Top [[id number]] Collected: 03/24/25 1134    Specimen: Blood Updated: 03/24/25 1145     Extra Tube hold for add-on     Comment: Auto resulted       Gold Top - SST [[id number]] Collected: 03/24/25 1134    Specimen: Blood Updated: 03/24/25 1145     Extra Tube Hold for add-ons.     Comment: Auto resulted.       Light Blue Top [[id number]] Collected: 03/24/25 1134    Specimen: Blood Updated: 03/24/25 1145     Extra Tube  Hold for add-ons.     Comment: Auto resulted       Extra Tubes [[id number]] Collected: 03/24/25 1134    Specimen: Blood Updated: 03/24/25 1145    Narrative:      The following orders were created for panel order Extra Tubes.  Procedure                               Abnormality         Status                     ---------                               -----------         ------                     Green Top (Gel)[[id number]]                                  Final result                 Please view results for these tests on the individual orders.    Green Top (Gel) [[id number]] Collected: 03/24/25 1134    Specimen: Blood Updated: 03/24/25 1145     Extra Tube Hold for add-ons.     Comment: Auto resulted.       Blood Culture - Blood, Arm, Left [[id number]] Collected: 03/24/25 1134    Specimen: Blood from Arm, Left Updated: 03/24/25 1141    POC Lactate [[id number]]  (Abnormal) Collected: 03/24/25 1132    Specimen: Blood Updated: 03/24/25 1134     Lactate 3.6 mmol/L      Comment: Serial Number: [id number]:  [id number]       POC Glucose Once [[id number]]  (Abnormal) Collected: 03/24/25 1118    Specimen: Blood Updated: 03/24/25 1125     Glucose 485 mg/dL      Comment: Serial Number: [id number]:  [id number]                Imaging Results (Last 24 Hours)       Procedure Component Value Units Date/Time    CT Abdomen Pelvis With Contrast [[id number]] Collected: 03/24/25 1253     Updated: 03/24/25 1259    Narrative:      CT ABDOMEN PELVIS W CONTRAST    Date of Exam: 3/24/2025 12:34 PM EDT    Indication: abd pain, N/V, AMS.    Comparison: Noncontrast CT abdomen pelvis 7/21/2012    Technique: Axial CT images were obtained of the abdomen and pelvis following the uneventful intravenous administration of iodinated contrast. Sagittal and coronal reconstructions were performed.  Automated exposure control and iterative reconstruction   methods were used.        Findings:  LUNG BASES:  Unremarkable without mass or  infiltrate.    LIVER:  Unremarkable parenchyma without focal lesion.  BILIARY/GALLBLADDER: Cholelithiasis with generalized gallbladder distention with no mural thickening or surrounding inflammatory change to suggest acute cholecystitis. Correlate with symptoms.  SPLEEN:  Unremarkable  PANCREAS:  Unremarkable  ADRENAL:  Unremarkable  KIDNEYS:  Unremarkable parenchyma with no solid mass identified. No obstruction.  No calculus identified.  GASTROINTESTINAL/MESENTERY: There is mural thickening involving the visualized distal esophagus consistent with esophagitis. No evidence of obstruction nor additional inflammation.  The appendix is normal.  MESENTERIC VESSELS:  Patent.  AORTA/IVC:  Normal caliber.    RETROPERITONEUM/LYMPH NODES:  Unremarkable    REPRODUCTIVE:  Unremarkable  BLADDER:  Unremarkable    OSSEUS STRUCTURES: There is chronic bilateral L5 spondylolysis without significant spondylolisthesis at L5/S1.        Impression:      Impression:  1.Mural thickening involving the visualized distal esophagus consistent with esophagitis.  2.Cholelithiasis with generalized gallbladder distention with no mural thickening or surrounding inflammatory change to suggest acute cholecystitis. Correlate with symptoms.  3.Chronic bilateral L5 spondylolysis without significant spondylolisthesis at L5/S1.        Electronically Signed: Rigo Argueta MD    3/24/2025 12:57 PM EDT    Workstation ID: [id number]    CT Head Without Contrast [[id number]] Collected: 03/24/25 1252     Updated: 03/24/25 1256    Narrative:      CT HEAD WO CONTRAST    Date of Exam: 3/24/2025 12:33 PM EDT    Indication: fall, hit head, AMS.    Comparison: Head CT 2/9/2021    Technique: Axial CT images were obtained of the head without contrast administration.  Coronal reconstructions were performed.  Automated exposure control and iterative reconstruction methods were used.      Findings:  Negative for large territory infarct, acute intracranial hemorrhage, large  mass lesion, hydrocephalus or midline shift. Parenchymal volume and density appears within normal limits for age. No large extra-axial fluid collection. Distal carotid   atherosclerotic disease. Orbits are symmetric. No obstructive sinus disease. No large mastoid effusion. Within limitations of motion artifact there is no displaced calvarial fracture. Repeat imaging obtained to correct for motion, still without   visualized fracture. No aggressive appearing bone lesion.      Impression:      Impression:  No acute intracranial findings by CT.      Electronically Signed: Elpidio Chavis MD    3/24/2025 12:54 PM EDT    Workstation ID: [id number]    XR Chest 1 View [[id number]] Collected: 03/24/25 1228     Updated: 03/24/25 1230    Narrative:      XR CHEST 1 VW    Date of Exam: 3/24/2025 12:21 PM EDT    Indication: AMS    Comparison: 2/9/2021    Findings:  The cardiomediastinal silhouette is within normal limits. Lungs are clear. No focal consolidation, pneumothorax, or significant pleural effusion. Osseous structures grossly intact.      Impression:      Impression:  No acute process.          Electronically Signed: Agus Johnson MD    3/24/2025 12:28 PM EDT    Workstation ID: [id number]              Assessment & Plan        This is a 54 y.o. female with:    Active and Resolved Problems  Active Hospital Problems    Diagnosis  POA    **Altered mental status [R41.82]  Yes      Resolved Hospital Problems   No resolved problems to display.       Altered mental status  Sepsis  Fever   RSV infection  History of COPD  CT head with no acute findings  SIRS criteria: WBC 16, febrile, HR >90, AMS  Lactic 3.6, repeat 4.8, procalcitonin 3.29  Not requiring supplemental oxygen, no s/sx of AECOPD  UA not consistent with UTI  UDS positive for THC, TCA, benzos and buprenorphine, EtOH negative, APAP negative  Continue vancomycin and Rocephin, pharmacy to dose, MRSA screen pending  Given 1 L IVF's in ED, 2 mL ordered to complete sepsis fluid  bolus ordered  Blood cultures pending  Noted allergy to Tylenol and aspirin, unable to follow commands to take oral ibuprofen, continue to treat fever without medications for now (i.e. ice packs, no blanket)  Keep n.p.o. while altered  Fall precautions  SCDs for VTE prophylaxis for now  Requiring restraints and sitter at this time    Combined metabolic and respiratory alkalosis  Acute kidney injury  Hypokalemia  Hypomagnesemia  ABG with pH 7.65, pCO2 28.9, pO2 66.8  BUN/Cr 22/1.73, b/l 0.9  Continue to replete electrolytes per replacement protocol  Continue IVFs  Avoid nephrotoxic medications, hold HCTZ  May need nephrology consultation if does not improve with fluids  Nephrology consulted     Abdominal pain  Esophagitis  CT A/P revealed cholelithiasis with generalized gallbladder distention with no mural thickening or surrounding inflammation to suggest cholecystitis, but did reveal esophagitis  LFTs WNL  Protonix twice daily IV  General Surgery consulted awaiting further recs    Hyperglycemia  Type 2 diabetes mellitus  BG on arrival 485, received 6 units insulin subq x 2 in ED  Serum Osm 316  2/14/25 A1c 8.06  POC BG q6h while NPO   Moderate SSI   Hypoglycemia protocol     RSV infection  History of COPD  Not requiring supplemental oxygen, no s/sx of acute exacerbation  Supportive management at this time  DuoNeb prn     Hypertension  BP controlled  Resume home medications once appropriate    Mood disorder/anxiety-resume home medications once reconciled      VTE Prophylaxis:  Mechanical VTE prophylaxis orders are present.        The patient desires to be as follows:    CODE STATUS:    Code Status (Patient has no pulse and is not breathing): CPR (Attempt to Resuscitate)  Medical Interventions (Patient has pulse or is breathing): Full Support        Alyssa Gardner, who can be contacted at [phone], is the designated person to make medical decisions on the patient's behalf if She is incapable of doing so. This  was clarified with patient and/or next of kin on 3/24/2025 during the course of this H&P.    Admission Status:  I believe this patient meets inpatient status.    Expected Length of Stay: 3 to 4 days    PDMP and Medication Dispenses via Sidebar reviewed and consistent with patient reported medications.    I discussed the patient's findings and my recommendations with family.      Signature:     This document has been electronically signed by Kyle Hill PA-C on March 24, 2025 15:17 EDT   [facility] Hospitalist Team      Electronically signed by Cullen Ascencio MD at 03/24/25 2236          Emergency Department Notes        Bella Dwyer, RN at 03/24/25 1500          Sitter at bedside. Pt not redirectable or following commands. Pt remains disoriented x4. Pt attempting to pull IV's out and cardiac monitoring off    Electronically signed by Bella wDyer, RN at 03/24/25 1530       Margaret Tam, RN at 03/24/25 1310          Pt unable to take PO motrin and has allergy to tylenol Kozzy WRAY notified no new orders at this time     Electronically signed by Margaret Tam, RN at 03/24/25 1312       Alisia Ayoub at 03/24/25 1145          Called Gely in lab, she is adding on orders to blood that has been collected.     Electronically signed by Alisia Ayoub at 03/24/25 1146       Alisia Ayoub at 03/24/25 1126          EKG requested       Electronically signed by Alisia Ayoub at 03/24/25 1126       Marycarmen Tam APRN at 03/24/25 1125          Subjective   History of Present Illness  54-year-old female with history of diabetes, hypertension, seizures, COPD presents the ED today due to altered mental status and hyperglycemia along with ongoing nausea and vomiting that started last night.  Patient lives with her daughter and EMS reports that the daughter noticed the patient was confused and somehow fell hitting her head on the coffee table last night.    Daughter at bedside reports  that patient became confused at 1 AM and that she did fall last night.  Daughter reports patient has been complaining of intermittent abdominal pain for 2 weeks and has had intermittent nausea vomiting for the last week.  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    COPD (chronic obstructive pulmonary disease)     Depression     Diabetes mellitus     Hyperlipidemia     Hypertension     Injury of back     Obesity     Seizures     Sleep apnea     Syncope without other cardiovascular symptoms        Allergies   Allergen Reactions    Acetaminophen Unknown - High Severity    Aspirin Hives     Per daughter and mother    Codeine Unknown - High Severity    Pregabalin Unknown - High Severity    Topiramate Unknown - High Severity    Valdecoxib Unknown - High Severity    Tramadol Swelling    Naproxen Sodium Hives    Tylenol With Codeine #3 [Acetaminophen-Codeine] Hives       Past Surgical History:   Procedure Laterality Date    ADENOIDECTOMY      FOOT WOUND CLOSURE Left     INCISION AND DRAINAGE OF WOUND Left 06/21/2023    Procedure: incison and draiange, left Second and third partial ray resection, applciation of wound vac;  Surgeon: KASIE Donaheu DPM;  Location: [facility] OR;  Service: Podiatry;  Laterality: Left;    LAPAROSCOPIC TUBAL LIGATION      ORIF HUMERUS FRACTURE Left 02/10/2021    Procedure: HUMERUS PROXIMAL OPEN REDUCTION INTERNAL FIXATION;  Surgeon: Julián Tinoco MD;  Location: [facility] OR;  Service: Orthopedics;  Laterality: Left;    TONSILLECTOMY         Family History   Family history unknown: Yes       Social History     Socioeconomic History    Marital status: Single   Tobacco Use    Smoking status: Every Day     Current packs/day: 1.00     Average packs/day: 1 pack/day for 42.2 years  (42.2 ttl pk-yrs)     Types: Cigarettes     Start date: 1983     Passive exposure: Current    Smokeless tobacco: Never   Vaping Use    Vaping status: Never Used   Substance and Sexual Activity    Alcohol use: Never    Drug use: Never    Sexual activity: Defer           Objective   Physical Exam  Vitals reviewed.   HENT:      Head: Normocephalic.   Eyes:      Extraocular Movements: Extraocular movements intact.      Conjunctiva/sclera: Conjunctivae normal.      Pupils: Pupils are equal, round, and reactive to light.   Cardiovascular:      Rate and Rhythm: Normal rate and regular rhythm.      Pulses: Normal pulses.      Heart sounds: Normal heart sounds.   Pulmonary:      Effort: Pulmonary effort is normal.      Breath sounds: Wheezing present.      Comments: Mild expiratory wheezes  Abdominal:      General: Bowel sounds are normal.      Palpations: Abdomen is soft.      Tenderness: There is abdominal tenderness.      Comments: Patient does shake her head yes for pain when palpating the epigastric and right upper quadrant.  No peritonitis or rigidity   Musculoskeletal:      Right lower leg: Edema present.      Left lower leg: Edema present.      Comments: 1+ edema to bilateral lower extremities.  Patient moves all extremities on assessment   Neurological:      Mental Status: She is alert. She is confused.      Comments: Patient not answering any questions verbally but will shake head yes or no.  Does follow some commands.  Moves all extremities on assessment   Psychiatric:         Mood and Affect: Mood normal.         Behavior: Behavior normal.         Procedures    EKG independently interpreted by Dr. Hernandez as sinus rhythm with PAC at a rate of 88.  Compared to previous from 2/9/2021 that was sinus rhythm at a rate of 75        ED Course  ED Course as of 03/24/25 1649   Mon Mar 24, 2025   1126 EKG and rectal temp requested [KB]   1136 Marked ready for CT [KB]   1145 After discussion with kay, patient tylenol  "allergy listed in 2013 but had norco in 2023. Ordered tylenol for fever [KB]   1146 Requested ABG and szr precautions [KB]   1210 Daughter states patient can't take tylenol d/t swelling. Can only take ibuprofen. Daughter states patient's mother states she can't take aspirin as well d/t anaphylaxis allergy.  [KB]   1221 Requested nursing staff to do swallow screen prior to ibuprofen.  If she is not able to pass swallow screen then she will likely need an NG tube for ibuprofen [KB]   1301 RSV, PCR(!): Detected [KB]   1307 Attempted swallow screen but patient spit the water back out so she failed the swallow screen.  Unable to give ibuprofen due to this.  Will try to give cool fluids.  Requested nursing staff to order the potassium replacement [KB]   1401 Call placed to surgery, awaiting call back [KB]   1455 Spoke to Kyle ALLISON with hospitalist group who agrees to admit patient. Awaiting surgery call back at this time [KB]   1511 Lactate(!!): 4.8  Sepsis bolus ordered [KB]   1617 Spoke to Dr. Gruber who states to obtain a HIDA scan to assess for cholecystitis and agreed to consult [KB]      ED Course User Index  [KB] Marycarmen Tam, APRN      /75   Pulse 91   Temp (!) 103 °F (39.4 °C) (Rectal)   Resp 16   Ht 165.1 cm (65\")   Wt 135 kg (297 lb 9.9 oz)   SpO2 98%   BMI 49.53 kg/m²   Labs Reviewed   RESPIRATORY PANEL PCR W/ COVID-19 (SARS-COV-2), NP SWAB IN UTM/VTP, 2 HR TAT - Abnormal; Notable for the following components:       Result Value    RSV, PCR Detected (*)     All other components within normal limits    Narrative:     In the setting of a positive respiratory panel with a viral infection PLUS a negative procalcitonin without other underlying concern for bacterial infection, consider observing off antibiotics or discontinuation of antibiotics and continue supportive care. If the respiratory panel is positive for atypical bacterial infection (Bordetella pertussis, Chlamydophila pneumoniae, or " Mycoplasma pneumoniae), consider antibiotic de-escalation to target atypical bacterial infection.   COMPREHENSIVE METABOLIC PANEL - Abnormal; Notable for the following components:    Glucose 479 (*)     BUN 22 (*)     Creatinine 1.73 (*)     Potassium 3.0 (*)     Chloride 89 (*)     CO2 29.9 (*)     AST (SGOT) 65 (*)     Anion Gap 18.1 (*)     eGFR 34.3 (*)     All other components within normal limits    Narrative:     GFR Categories in Chronic Kidney Disease (CKD)      GFR Category          GFR (mL/min/1.73)    Interpretation  G1                     90 or greater         Normal or high (1)  G2                      60-89                Mild decrease (1)  G3a                   45-59                Mild to moderate decrease  G3b                   30-44                Moderate to severe decrease  G4                    15-29                Severe decrease  G5                    14 or less           Kidney failure          (1)In the absence of evidence of kidney disease, neither GFR category G1 or G2 fulfill the criteria for CKD.    eGFR calculation 2021 CKD-EPI creatinine equation, which does not include race as a factor   CBC WITH AUTO DIFFERENTIAL - Abnormal; Notable for the following components:    WBC 16.05 (*)     Neutrophil % 89.0 (*)     Lymphocyte % 4.5 (*)     Monocyte % 4.5 (*)     Eosinophil % 0.1 (*)     Immature Grans % 1.7 (*)     Neutrophils, Absolute 14.29 (*)     Immature Grans, Absolute 0.27 (*)     All other components within normal limits   LACTIC ACID, REFLEX - Abnormal; Notable for the following components:    Lactate 4.8 (*)     All other components within normal limits   BLOOD GAS, ARTERIAL - Abnormal; Notable for the following components:    pH, Arterial 7.657 (*)     pCO2, Arterial 28.9 (*)     pO2, Arterial 66.8 (*)     HCO3, Arterial 32.4 (*)     Base Excess, Arterial 11.9 (*)     CO2 Content 33.2 (*)     All other components within normal limits   MAGNESIUM - Abnormal; Notable for the  following components:    Magnesium 1.4 (*)     All other components within normal limits   PHOSPHORUS - Abnormal; Notable for the following components:    Phosphorus 2.0 (*)     All other components within normal limits   TROPONIN - Abnormal; Notable for the following components:    HS Troponin T 58 (*)     All other components within normal limits    Narrative:     High Sensitive Troponin T Reference Range:  <14.0 ng/L- Negative Female for AMI  <22.0 ng/L- Negative Male for AMI  >=14 - Abnormal Female indicating possible myocardial injury.  >=22 - Abnormal Male indicating possible myocardial injury.   Clinicians would have to utilize clinical acumen, EKG, Troponin, and serial changes to determine if it is an Acute Myocardial Infarction or myocardial injury due to an underlying chronic condition.        BNP (IN-HOUSE) - Abnormal; Notable for the following components:    proBNP 2,369.0 (*)     All other components within normal limits    Narrative:     This assay is used as an aid in the diagnosis of individuals suspected of having heart failure. It can be used as an aid in the diagnosis of acute decompensated heart failure (ADHF) in patients presenting with signs and symptoms of ADHF to the emergency department (ED). In addition, NT-proBNP of <300 pg/mL indicates ADHF is not likely.    Age Range Result Interpretation  NT-proBNP Concentration (pg/mL:      <50             Positive            >450                   Gray                 300-450                    Negative             <300    50-75           Positive            >900                  Gray                300-900                  Negative            <300      >75             Positive            >1800                  Gray                300-1800                  Negative            <300   URINE DRUG SCREEN - Abnormal; Notable for the following components:    THC, Screen, Urine Positive (*)     Benzodiazepine Screen, Urine Positive (*)     Tricyclic  Antidepressants Screen Positive (*)     Buprenorphine, Screen, Urine Positive (*)     All other components within normal limits    Narrative:     Cutoff For Drugs Screened:    Amphetamines               500 ng/ml  Barbiturates               200 ng/ml  Benzodiazepines            150 ng/ml  Cocaine                    150 ng/ml  Methadone                  200 ng/ml  Opiates                    100 ng/ml  Phencyclidine               25 ng/ml  THC                         50 ng/ml  Methamphetamine            500 ng/ml  Tricyclic Antidepressants  300 ng/ml  Oxycodone                  100 ng/ml  Buprenorphine               10 ng/ml    The normal value for all drugs tested is negative. This report includes unconfirmed screening results, with the cutoff values listed, to be used for medical treatment purposes only.  Unconfirmed results must not be used for non-medical purposes such as employment or legal testing.  Clinical consideration should be applied to any drug of abuse test, particularly when unconfirmed results are used.    All urine drugs of abuse requests without chain of custody are for medical screening purposes only.  False positives are possible.     URINALYSIS W/ CULTURE IF INDICATED - Abnormal; Notable for the following components:    Appearance, UA Cloudy (*)     Specific Gravity, UA 1.036 (*)     Glucose, UA >=1000 mg/dL (3+) (*)     Ketones, UA Trace (*)     Blood, UA Large (3+) (*)     Protein, UA >=300 mg/dL (3+) (*)     All other components within normal limits    Narrative:     In absence of clinical symptoms, the presence of pyuria, bacteria, and/or nitrites on the urinalysis result does not correlate with infection.   ACETONE - Abnormal; Notable for the following components:    Acetone Small (*)     All other components within normal limits   LIPASE - Abnormal; Notable for the following components:    Lipase 10 (*)     All other components within normal limits   PROCALCITONIN - Abnormal; Notable for the  "following components:    Procalcitonin 3.29 (*)     All other components within normal limits    Narrative:     As a Marker for Sepsis (Non-Neonates):    1. <0.5 ng/mL represents a low risk of severe sepsis and/or septic shock.  2. >2 ng/mL represents a high risk of severe sepsis and/or septic shock.    As a Marker for Lower Respiratory Tract Infections that require antibiotic therapy:    PCT on Admission    Antibiotic Therapy       6-12 Hrs later    >0.5                Strongly Recommended  >0.25 - <0.5        Recommended   0.1 - 0.25          Discouraged              Remeasure/reassess PCT  <0.1                Strongly Discouraged     Remeasure/reassess PCT    As 28 day mortality risk marker: \"Change in Procalcitonin Result\" (>80% or <=80%) if Day 0 (or Day 1) and Day 4 values are available. Refer to http://www.[email or web address] [place]-pct-calculator.com    Change in PCT <=80%  A decrease of PCT levels below or equal to 80% defines a positive change in PCT test result representing a higher risk for 28-day all-cause mortality of patients diagnosed with severe sepsis for septic shock.    Change in PCT >80%  A decrease of PCT levels of more than 80% defines a negative change in PCT result representing a lower risk for 28-day all-cause mortality of patients diagnosed with severe sepsis or septic shock.      URINALYSIS, MICROSCOPIC ONLY - Abnormal; Notable for the following components:    Bacteria, UA Trace (*)     Squamous Epithelial Cells, UA 3-6 (*)     Yeast, UA Small/1+ Budding Yeast (*)     All other components within normal limits   PROTIME-INR - Abnormal; Notable for the following components:    Protime 14.9 (*)     INR 1.18 (*)     All other components within normal limits   OSMOLALITY, SERUM - Abnormal; Notable for the following components:    Osmolality 316 (*)     All other components within normal limits   HIGH SENSITIVITIY TROPONIN T 1HR - Abnormal; Notable for the following components:    HS Troponin T 57 (*)     All " other components within normal limits    Narrative:     High Sensitive Troponin T Reference Range:  <14.0 ng/L- Negative Female for AMI  <22.0 ng/L- Negative Male for AMI  >=14 - Abnormal Female indicating possible myocardial injury.  >=22 - Abnormal Male indicating possible myocardial injury.   Clinicians would have to utilize clinical acumen, EKG, Troponin, and serial changes to determine if it is an Acute Myocardial Infarction or myocardial injury due to an underlying chronic condition.        POCT GLUCOSE FINGERSTICK - Abnormal; Notable for the following components:    Glucose 485 (*)     All other components within normal limits   POC LACTATE - Abnormal; Notable for the following components:    Lactate 3.6 (*)     All other components within normal limits   POCT GLUCOSE FINGERSTICK - Abnormal; Notable for the following components:    Glucose 399 (*)     All other components within normal limits   POCT GLUCOSE FINGERSTICK - Abnormal; Notable for the following components:    Glucose 419 (*)     All other components within normal limits   ACETAMINOPHEN LEVEL - Normal    Narrative:     Acetaminophen Therapeutic Range  5-20 ug/mL      Hours after ingestion            Toxic Value    4 Hours                           150 ug/mL    8 Hours                            70 ug/mL   12 Hours                            40 ug/mL   16 Hours                            20 ug/mL    These values apply to a single ingestion only.    SALICYLATE LEVEL - Normal   TSH RFX ON ABNORMAL TO FREE T4 - Normal   APTT - Normal   BLOOD CULTURE   BLOOD CULTURE   MRSA SCREEN, PCR   RAINBOW DRAW    Narrative:     The following orders were created for panel order [place] Draw.  Procedure                               Abnormality         Status                     ---------                               -----------         ------                     Green Top (Gel)[[id number]]                                  Final result               Lavender  Top[[id number]]                                     Final result               Gold Top - [facility][[id number]]                                   Final result               Light Blue Top[[id number]]                                   Final result                 Please view results for these tests on the individual orders.   ETHANOL    Narrative:     Plasma Ethanol Clinical Symptoms:    ETOH (%)               Clinical Symptom  .01-.05              No apparent influence  .03-.12              Euphoria, Diminished judgment and attention   .09-.25              Impaired comprehension, Muscle incoordination  .18-.30              Confusion, Staggered gait, Slurred speech  .25-.40              Markedly decreased response to stimuli, unable to stand or                        walk, vomitting, sleep or stupor  .35-.50              Comatose, Anesthesia, Subnormal body temperature       LACTIC ACID, REFLEX   POC LACTATE   POCT GLUCOSE FINGERSTICK   POCT GLUCOSE FINGERSTICK   POCT GLUCOSE FINGERSTICK   POCT GLUCOSE FINGERSTICK   POCT GLUCOSE FINGERSTICK   POCT GLUCOSE FINGERSTICK   POCT GLUCOSE FINGERSTICK   POCT GLUCOSE FINGERSTICK   POCT GLUCOSE FINGERSTICK   POCT GLUCOSE FINGERSTICK   POCT GLUCOSE FINGERSTICK   POCT GLUCOSE FINGERSTICK   POCT GLUCOSE FINGERSTICK   POCT GLUCOSE FINGERSTICK   CBC AND DIFFERENTIAL    Narrative:     The following orders were created for panel order CBC & Differential.  Procedure                               Abnormality         Status                     ---------                               -----------         ------                     CBC Auto Differential[[id number]]        Abnormal            Final result                 Please view results for these tests on the individual orders.   GREEN TOP   LAVENDER TOP   GOLD [facility]   LIGHT BLUE TOP   KETONE BODIES SERUM    Narrative:     The following orders were created for panel order Ketone Bodies, Serum (Not performed at [place]).  Procedure                                Abnormality         Status                     ---------                               -----------         ------                     Acetone[[id number]]                      Abnormal            Final result                 Please view results for these tests on the individual orders.   EXTRA TUBES    Narrative:     The following orders were created for panel order Extra Tubes.  Procedure                               Abnormality         Status                     ---------                               -----------         ------                     Green Top (Gel)[[id number]]                                  Final result                 Please view results for these tests on the individual orders.   GREEN TOP     Medications   sodium chloride 0.9 % flush 10 mL (has no administration in time range)   sodium chloride 0.9 % flush 10 mL (has no administration in time range)   Potassium Replacement - Follow Nurse / BPA Driven Protocol (has no administration in time range)   potassium chloride 10 mEq in 100 mL IVPB (10 mEq Intravenous New Bag 3/24/25 2927)   Magnesium Standard Dose Replacement - Follow Nurse / BPA Driven Protocol (has no administration in time range)   Magnesium Standard Dose Replacement - Follow Nurse / BPA Driven Protocol (has no administration in time range)   Phosphorus Replacement - Follow Nurse / BPA Driven Protocol (has no administration in time range)   Calcium Replacement - Follow Nurse / BPA Driven Protocol (has no administration in time range)   sennosides-docusate (PERICOLACE) 8.6-50 MG per tablet 2 tablet (has no administration in time range)     And   polyethylene glycol (MIRALAX) packet 17 g (has no administration in time range)     And   bisacodyl (DULCOLAX) EC tablet 5 mg (has no administration in time range)     And   bisacodyl (DULCOLAX) suppository 10 mg (has no administration in time range)   melatonin tablet 5 mg (has no administration in time range)   ondansetron  ODT (ZOFRAN-ODT) disintegrating tablet 4 mg (has no administration in time range)     Or   ondansetron (ZOFRAN) injection 4 mg (has no administration in time range)   sodium chloride 0.9 % bolus 2,000 mL (2,000 mL Intravenous New Bag 3/24/25 1558)   cefTRIAXone (ROCEPHIN) 2,000 mg in sodium chloride 0.9 % 100 mL MBP (has no administration in time range)   Pharmacy to dose vancomycin (has no administration in time range)   dextrose (GLUTOSE) oral gel 15 g (has no administration in time range)   dextrose (D50W) (25 g/50 mL) IV injection 25 g (has no administration in time range)   glucagon (GLUCAGEN) injection 1 mg (has no administration in time range)   insulin lispro (HUMALOG/ADMELOG) injection 2-9 Units (has no administration in time range)   insulin glargine (LANTUS, SEMGLEE) injection 20 Units (has no administration in time range)   nebivolol (BYSTOLIC) tablet 10 mg ( Oral Dose Auto Held 4/1/25 0900)   albuterol (PROVENTIL) nebulizer solution 0.083% 2.5 mg/3mL (has no administration in time range)   pantoprazole (PROTONIX) injection 40 mg (has no administration in time range)   insulin lispro (HUMALOG/ADMELOG) injection 6 Units (6 Units Subcutaneous Given 3/24/25 1153)   sodium chloride 0.9 % bolus 1,000 mL ( Intravenous Currently Infusing 3/24/25 1413)   vancomycin IVPB 1750 mg in 0.9% Sodium Chloride (premix) 500 mL (0 mg Intravenous Stopped 3/24/25 1514)   cefTRIAXone (ROCEPHIN) 2,000 mg in sodium chloride 0.9 % 100 mL MBP (0 mg Intravenous Stopped 3/24/25 1310)   ondansetron (ZOFRAN) injection 4 mg (4 mg Intravenous Given 3/24/25 1302)   iopamidol (ISOVUE-370) 76 % injection 100 mL (100 mL Intravenous Given 3/24/25 1251)   insulin lispro (HUMALOG/ADMELOG) injection 6 Units (6 Units Subcutaneous Given 3/24/25 1405)   LORazepam (ATIVAN) injection 1 mg (1 mg Intravenous Given 3/24/25 1420)   sodium chloride 0.9 % bolus 710 mL (710 mL Intravenous New Bag 3/24/25 1523)     CT Abdomen Pelvis With Contrast  Result  Date: 3/24/2025  Impression: 1.Mural thickening involving the visualized distal esophagus consistent with esophagitis. 2.Cholelithiasis with generalized gallbladder distention with no mural thickening or surrounding inflammatory change to suggest acute cholecystitis. Correlate with symptoms. 3.Chronic bilateral L5 spondylolysis without significant spondylolisthesis at L5/S1. Electronically Signed: Rigo Argueta MD  3/24/2025 12:57 PM EDT  Workstation ID: [id number]    CT Head Without Contrast  Result Date: 3/24/2025  Impression: No acute intracranial findings by CT. Electronically Signed: Elpidio Chavis MD  3/24/2025 12:54 PM EDT  Workstation ID: [id number]    XR Chest 1 View  Result Date: 3/24/2025  Impression: No acute process. Electronically Signed: Agus Johnson MD  3/24/2025 12:28 PM EDT  Workstation ID: [id number]                                                     Medical Decision Making  Patient was a for the above complaints.  IV was established and blood work was obtained to assess for electrolyte abnormalities, DKA versus HHS, infection.  White blood cell count 16.05 with lactate 3.6 and Pro-Eliecer 3.29.  Was started on broad-spectrum antibiotics with Rocephin and vancomycin.  Glucose was initially 479 was given 2 separate doses of 60 units subcu insulin with a repeat of 399 prior to admission.  Lipase 10, TSH within normal limits.  Acute kidney injury with creatinine 1.73 BUN 22 GFR 34.3.  Considered DKA however ABG shows a pH of 7.657.  Potassium was low at 3, magnesium was low at 1.4 and phosphorus was low at 2.  Replacement protocol was ordered.  Small amount of acetone seen.  Patient was given 1 L initially of normal saline however repeat lactic was 4.8 so was given the full sepsis bolus.  Blood cultures are currently pending at this time.  Initial troponin 58 with a repeat of 57 likely due to elevated kidney function.  Respiratory panel positive for RSV.  Patient had a rectal temperature of 103 Fahrenheit  however is allergic to aspirin and Tylenol.  Remains n.p.o. due to failing swallow screen so was unable to give ibuprofen.  Urinalysis shows trace bacteria.  Chest x-ray independently interpreted by radiologist as no acute process.  Head CT independently interpreted by the radiologist as no acute intracranial findings by CT.  Abdominal CT independently interpreted by the radiologist as:  1.Mural thickening involving the visualized distal esophagus consistent with esophagitis.   2.Cholelithiasis with generalized gallbladder distention with no mural thickening or surrounding inflammatory change to suggest acute cholecystitis. Correlate with symptoms.   3.Chronic bilateral L5 spondylolysis without significant spondylolisthesis at L5/S1.     Discussed case with Dr. Gruber with general surgery who states that she will come assess patient and request a HIDA scan to be ordered to assess for cholecystitis.  This was ordered.  Patient will be admitted to the hospitalist service for further evaluation and management.  Discussed with Sari ALLISON with the hospitalist group who agrees to admit patient.    Based on the clinical findings at this time I anticipate the patient will require a 2 midnight stay      Problems Addressed:  WILLIAM (acute kidney injury): complicated acute illness or injury  Altered mental status, unspecified altered mental status type: complicated acute illness or injury  Calculus of gallbladder without cholecystitis without obstruction: complicated acute illness or injury  Fever, unspecified fever cause: complicated acute illness or injury  Hyperglycemia: complicated acute illness or injury  Hypokalemia: complicated acute illness or injury  Hypomagnesemia: complicated acute illness or injury  RSV infection: complicated acute illness or injury  Sepsis, due to unspecified organism, unspecified whether acute organ dysfunction present: complicated acute illness or injury    Amount and/or Complexity of Data  Reviewed  Labs: ordered. Decision-making details documented in ED Course.  Radiology: ordered and independent interpretation performed. Decision-making details documented in ED Course.  ECG/medicine tests: ordered and independent interpretation performed. Decision-making details documented in ED Course.    Risk  OTC drugs.  Prescription drug management.  Decision regarding hospitalization.        Final diagnoses:   Altered mental status, unspecified altered mental status type   Hyperglycemia   Fever, unspecified fever cause   Sepsis, due to unspecified organism, unspecified whether acute organ dysfunction present   RSV infection   WILLIAM (acute kidney injury)   Hypokalemia   Calculus of gallbladder without cholecystitis without obstruction   Hypomagnesemia       ED Disposition  ED Disposition       ED Disposition   Decision to Admit    Condition   --    Comment   Level of Care: Telemetry [5]   Admitting Physician: LETY MYERS [[id number]]   Attending Physician: LETY MYERS [[id number]]                 No follow-up provider specified.       Medication List      No changes were made to your prescriptions during this visit.            Marycarmen Tam APRN  25 1653      Electronically signed by Marycarmen Tam APRN at 25 1653       Operative/Procedure Notes (last 48 hours)  Notes from 25 1336 through 25 1336   No notes of this type exist for this encounter.          Physician Progress Notes (last 48 hours)        Sarah Kinsey MD at 25 [facility]9              [facility] MEDICINE SERVICE  DAILY PROGRESS NOTE    NAME: Serina Powell  : 1970  MRN: [id number]      LOS: 1 day     PROVIDER OF SERVICE: Sarah Kinsey MD    Chief Complaint: Altered mental status    Subjective:     Interval History:  History taken from: patient    Not complaining, confused    Review of Systems:   Review of Systems   Unable to perform ROS: Mental status change   All other systems reviewed and are  negative.      Objective:     Vital Signs  Temp:  [99.7 °F (37.6 °C)-102.1 °F (38.9 °C)] 101.2 °F (38.4 °C)  Heart Rate:  [78-95] 81  Resp:  [11-18] 14  BP: (115-173)/() 151/84   Body mass index is 48.79 kg/m².    Physical Exam  Physical Exam  Constitutional:       Appearance: Normal appearance.   HENT:      Head: Normocephalic and atraumatic.      Nose: Nose normal.      Mouth/Throat:      Mouth: Mucous membranes are moist.   Eyes:      Extraocular Movements: Extraocular movements intact.      Pupils: Pupils are equal, round, and reactive to light.   Cardiovascular:      Rate and Rhythm: Normal rate and regular rhythm.   Pulmonary:      Effort: Pulmonary effort is normal.      Breath sounds: Normal breath sounds.   Abdominal:      General: Abdomen is flat. Bowel sounds are normal.      Palpations: Abdomen is soft.   Musculoskeletal:      Cervical back: Normal range of motion and neck supple.      Comments: Left lower extremity swelling and erythema involving the shin and lateral aspect of the left leg   Skin:     General: Skin is warm and dry.   Neurological:      Mental Status: She is alert.      Comments: Alert, following directions, confused         Current Medications:  Scheduled Meds:budesonide-formoterol, 2 puff, Inhalation, BID - RT  cefTRIAXone, 2,000 mg, Intravenous, Q24H  insulin glargine, 20 Units, Subcutaneous, Nightly  insulin lispro, 2-9 Units, Subcutaneous, Q6H  ipratropium-albuterol, 3 mL, Nebulization, 4x Daily - RT  metroNIDAZOLE, 500 mg, Intravenous, Q8H  [Held by provider] nebivolol, 10 mg, Oral, Daily  OLANZapine zydis, 2.5 mg, Oral, BID  pantoprazole, 40 mg, Intravenous, Q12H  potassium chloride, 10 mEq, Intravenous, Q1H      Continuous Infusions:sodium chloride, 125 mL/hr, Last Rate: 125 mL/hr (03/24/25 1123)      PRN Meds:.  albuterol    senna-docusate sodium **AND** polyethylene glycol **AND** bisacodyl **AND** bisacodyl    Calcium Replacement - Follow Nurse / BPA Driven Protocol     dextrose    dextrose    glucagon (human recombinant)    influenza vaccine    Magnesium Standard Dose Replacement - Follow Nurse / BPA Driven Protocol    Magnesium Standard Dose Replacement - Follow Nurse / BPA Driven Protocol    melatonin    ondansetron ODT **OR** ondansetron    Phosphorus Replacement - Follow Nurse / BPA Driven Protocol    Potassium Replacement - Follow Nurse / BPA Driven Protocol    sodium chloride    [COMPLETED] Insert Peripheral IV **AND** sodium chloride       Diagnostic Data    Results from last 7 days   Lab Units 03/25/25  0551 03/24/25  1134   WBC 10*3/mm3 18.34* 16.05*   HEMOGLOBIN g/dL 12.7 13.6   HEMATOCRIT % 39.0 40.0   PLATELETS 10*3/mm3 148 187   GLUCOSE mg/dL 229* 479*   CREATININE mg/dL 1.33* 1.73*   BUN mg/dL 23* 22*   SODIUM mmol/L 141 137   POTASSIUM mmol/L 3.0* 3.0*   AST (SGOT) U/L  --  65*   ALT (SGPT) U/L  --  18   ALK PHOS U/L  --  75   BILIRUBIN mg/dL  --  0.6   ANION GAP mmol/L 14.0 18.1*       CT Abdomen Pelvis With Contrast  Result Date: 3/24/2025  Impression: 1.Mural thickening involving the visualized distal esophagus consistent with esophagitis. 2.Cholelithiasis with generalized gallbladder distention with no mural thickening or surrounding inflammatory change to suggest acute cholecystitis. Correlate with symptoms. 3.Chronic bilateral L5 spondylolysis without significant spondylolisthesis at L5/S1. Electronically Signed: Rigo Argueta MD  3/24/2025 12:57 PM EDT  Workstation ID: [id number]    CT Head Without Contrast  Result Date: 3/24/2025  Impression: No acute intracranial findings by CT. Electronically Signed: Elpidio Chavis MD  3/24/2025 12:54 PM EDT  Workstation ID: [id number]    XR Chest 1 View  Result Date: 3/24/2025  Impression: No acute process. Electronically Signed: Agus Johnson MD  3/24/2025 12:28 PM EDT  Workstation ID: [id number]        I reviewed the patient's new clinical results.    Assessment/Plan:     Active and Resolved Problems  Active Hospital Problems     Diagnosis  POA    **Altered mental status [R41.82]  Yes      Resolved Hospital Problems   No resolved problems to display.       Left lower extremity cellulitis  Sepsis  AMS with agitation  Cholelithiasis with gallbladder wall distention  Esophagitis  RSV infection  WILLIAM  Metabolic acidosis  Hypokalemia  Hypomagnesemia  Diabetes mellitus type 2  Hypertension        -Patient still having fevers.  Blood cultures negative so far.  MRSA PCR negative.  Continue IV ceftriaxone.  Continue to follow blood cultures.  Last procalcitonin was elevated at 3.29.  Monitor.  Follow-up on Dopplers ordered to rule out DVT.    -Cholelithiasis with gallbladder wall distention:-General Surgery has ordered a HIDA scan to rule out acute cholecystitis.  Add IV Flagyl since patient still having recurrent fevers with Tmax of 101.3 today and Tmax of 101.9 yesterday.  Flagyl can be discontinued once acute cholecystitis has been ruled out.    -Patient confused and in 2 point restraints due to agitation.  Start olanzapine.  Discontinue restraints.    - Continue Protonix for esophagitis demonstrated on CAT scan    - Supportive care for RSV infection.  Start droplet isolation.    - Renal function is improving.  Nephrology following.  Continue to monitor.    - Metabolic acidosis is improving.  Continue to monitor acid-base status.    - Replace potassium for hypokalemia    -Hypomagnesemia is improved    - Continue current insulin regimen, monitor the blood glucose trend with regard to diabetes mellitus.  Hemoglobin A1c is 8.06% done last month February 2025    -  Continue current blood pressure medication regimen.    - Continue current management.    VTE Prophylaxis:  Mechanical VTE prophylaxis orders are present.             Disposition Planning:     Barriers to Discharge: Pending clinical improvement  Anticipated Date of Discharge: 3/31/2025  Place of Discharge: Home versus SNF          Code Status and Medical Interventions: CPR (Attempt to  Resuscitate); Full Support   Ordered at: 25 1459     Code Status (Patient has no pulse and is not breathing):    CPR (Attempt to Resuscitate)     Medical Interventions (Patient has pulse or is breathing):    Full Support       Signature: Electronically signed by Sarah Kinsey MD, 25, 12:39 EDT.  [facility] Hospitalist Team     Electronically signed by Sarah Kinsey MD at 25 1240       Sarah Kinsey MD at 25 1207              [facility] MEDICINE SERVICE  DAILY PROGRESS NOTE    NAME: Serina Powell  : 1970  MRN: [id number]      LOS: 1 day     PROVIDER OF SERVICE: Sarah Kinsey MD    Chief Complaint: Altered mental status    Subjective:     Interval History:  History taken from: patient    No new complaint    Review of Systems:   Review of Systems   All other systems reviewed and are negative.      Objective:     Vital Signs  Temp:  [99.7 °F (37.6 °C)-102.1 °F (38.9 °C)] 101.2 °F (38.4 °C)  Heart Rate:  [78-95] 81  Resp:  [11-18] 14  BP: (115-173)/() 151/84   Body mass index is 48.79 kg/m².    Physical Exam  Physical Exam  Constitutional:       Appearance: Normal appearance.   HENT:      Head: Normocephalic and atraumatic.      Nose: Nose normal.      Mouth/Throat:      Mouth: Mucous membranes are moist.   Eyes:      Extraocular Movements: Extraocular movements intact.      Pupils: Pupils are equal, round, and reactive to light.   Cardiovascular:      Rate and Rhythm: Normal rate and regular rhythm.   Pulmonary:      Effort: Pulmonary effort is normal.      Breath sounds: Normal breath sounds.   Abdominal:      General: Abdomen is flat. Bowel sounds are normal.      Palpations: Abdomen is soft.   Musculoskeletal:      Cervical back: Normal range of motion and neck supple.      Comments: Left lower extremity swelling and erythema involving the shin and lateral aspect of the left leg   Skin:     General: Skin is warm and dry.   Neurological:       Mental Status: She is alert.      Comments: Alert, following directions, confused         Current Medications:  Scheduled Meds:cefTRIAXone, 2,000 mg, Intravenous, Q24H  insulin glargine, 20 Units, Subcutaneous, Nightly  insulin lispro, 2-9 Units, Subcutaneous, Q6H  [Held by provider] nebivolol, 10 mg, Oral, Daily  OLANZapine, 2.5 mg, Oral, BID  pantoprazole, 40 mg, Intravenous, Q12H  potassium chloride, 10 mEq, Intravenous, Q1H      Continuous Infusions:sodium chloride, 125 mL/hr, Last Rate: 125 mL/hr (03/24/25 2333)      PRN Meds:.  albuterol    senna-docusate sodium **AND** polyethylene glycol **AND** bisacodyl **AND** bisacodyl    Calcium Replacement - Follow Nurse / BPA Driven Protocol    dextrose    dextrose    glucagon (human recombinant)    influenza vaccine    Magnesium Standard Dose Replacement - Follow Nurse / BPA Driven Protocol    Magnesium Standard Dose Replacement - Follow Nurse / BPA Driven Protocol    melatonin    ondansetron ODT **OR** ondansetron    Phosphorus Replacement - Follow Nurse / BPA Driven Protocol    Potassium Replacement - Follow Nurse / BPA Driven Protocol    sodium chloride    [COMPLETED] Insert Peripheral IV **AND** sodium chloride       Diagnostic Data    Results from last 7 days   Lab Units 03/25/25  0551 03/24/25  1134   WBC 10*3/mm3 18.34* 16.05*   HEMOGLOBIN g/dL 12.7 13.6   HEMATOCRIT % 39.0 40.0   PLATELETS 10*3/mm3 148 187   GLUCOSE mg/dL 229* 479*   CREATININE mg/dL 1.33* 1.73*   BUN mg/dL 23* 22*   SODIUM mmol/L 141 137   POTASSIUM mmol/L 3.0* 3.0*   AST (SGOT) U/L  --  65*   ALT (SGPT) U/L  --  18   ALK PHOS U/L  --  75   BILIRUBIN mg/dL  --  0.6   ANION GAP mmol/L 14.0 18.1*       CT Abdomen Pelvis With Contrast  Result Date: 3/24/2025  Impression: 1.Mural thickening involving the visualized distal esophagus consistent with esophagitis. 2.Cholelithiasis with generalized gallbladder distention with no mural thickening or surrounding inflammatory change to suggest acute  cholecystitis. Correlate with symptoms. 3.Chronic bilateral L5 spondylolysis without significant spondylolisthesis at L5/S1. Electronically Signed: Rigo Argueta MD  3/24/2025 12:57 PM EDT  Workstation ID: [id number]    CT Head Without Contrast  Result Date: 3/24/2025  Impression: No acute intracranial findings by CT. Electronically Signed: Elpidio Chavis MD  3/24/2025 12:54 PM EDT  Workstation ID: [id number]    XR Chest 1 View  Result Date: 3/24/2025  Impression: No acute process. Electronically Signed: Agus Johnson MD  3/24/2025 12:28 PM EDT  Workstation ID: [id number]        I reviewed the patient's new clinical results.    Assessment/Plan:     Active and Resolved Problems  Active Hospital Problems    Diagnosis  POA    **Altered mental status [R41.82]  Yes      Resolved Hospital Problems   No resolved problems to display.       Left lower extremity cellulitis  Sepsis  AMS with agitation  Cholelithiasis with gallbladder wall distention  Esophagitis  RSV infection  WILLIAM  Metabolic acidosis  Hypokalemia  Hypomagnesemia  Diabetes mellitus type 2  Hypertension        -Patient still having fevers.  Blood cultures negative so far.  MRSA PCR negative.  Continue IV ceftriaxone.  Continue to follow blood cultures.  Last procalcitonin was elevated at 3.29.  Monitor.  Follow-up on Dopplers ordered to rule out DVT.    -Cholelithiasis with gallbladder wall distention:-General Surgery has ordered a HIDA scan to rule out acute cholecystitis.  Add IV Flagyl since patient still having recurrent fevers with Tmax of 101.3 today and Tmax of 101.9 yesterday.  Flagyl can be discontinued once acute cholecystitis has been ruled out.    -Patient confused and in 2 point restraints due to agitation.  Start olanzapine.  Discontinue restraints.    - Continue Protonix for esophagitis demonstrated on CAT scan    - Supportive care for RSV infection.  Start droplet isolation.    - Renal function is improving.  Nephrology following.  Continue to  monitor.    - Metabolic acidosis is improving.  Continue to monitor acid-base status.    - Replace potassium for hypokalemia    -Hypomagnesemia is improved    - Continue current insulin regimen, monitor the blood glucose trend with regard to diabetes mellitus.  Hemoglobin A1c is 8.06% done last month February 2025    -  Continue current blood pressure medication regimen.    - Continue current management.    VTE Prophylaxis:  Mechanical VTE prophylaxis orders are present.             Disposition Planning:     Barriers to Discharge: Pending clinical improvement  Anticipated Date of Discharge: 3/31/2025  Place of Discharge: Home versus SNF          Code Status and Medical Interventions: CPR (Attempt to Resuscitate); Full Support   Ordered at: 03/24/25 1459     Code Status (Patient has no pulse and is not breathing):    CPR (Attempt to Resuscitate)     Medical Interventions (Patient has pulse or is breathing):    Full Support       Signature: Electronically signed by Sarah Kinsey MD, 03/25/25, 12:07 EDT.  [facility] Hospitalist Team     Electronically signed by Sarah Kinsey MD at 03/25/25 1234          Consult Notes (last 48 hours)        Tyson Villanueva MD at 03/25/25 1026        Consult Orders    1. Inpatient Nephrology Consult [[id number]] ordered by Kyle Hill PA-C at 03/24/25 1535                 [facility] CONSULT NOTE    Referring Provider: Kyle Hill PA-C   Reason for Consultation: Acute kidney injury    Chief complaint .  Altered mental status    History of present illness: Patient is 54 years old female with history of COPD, hypertension, diabetes, GERD, admitted to the hospital on 3/24 with altered mental status.  Patient was brought to the hospital due to confusion and witnessed fall.  As per reports she had nausea, vomiting and abdominal pain intermittently for few days.  Patient was febrile on presentation.  Lab workup showed hyperglycemia and elevated creatinine and  hypokalemia.  UA was positive.  Urine drug screen was positive for THC, benzodiazepine, tricyclic antidepressants.  Respiratory panel was positive for RSV.  Patient also had lactic acidosis but improved with IV hydration, restrained.    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    COPD (chronic obstructive pulmonary disease)     Depression     Diabetes mellitus     Hyperlipidemia     Hypertension     Injury of back     Obesity     Seizures     Sleep apnea     Syncope without other cardiovascular symptoms      Past Surgical History:   Procedure Laterality Date    ADENOIDECTOMY      FOOT WOUND CLOSURE Left     INCISION AND DRAINAGE OF WOUND Left 06/21/2023    Procedure: incison and draiange, left Second and third partial ray resection, applciation of wound vac;  Surgeon: KASIE Donahue DPM;  Location: [facility] MAIN OR;  Service: Podiatry;  Laterality: Left;    LAPAROSCOPIC TUBAL LIGATION      ORIF HUMERUS FRACTURE Left 02/10/2021    Procedure: HUMERUS PROXIMAL OPEN REDUCTION INTERNAL FIXATION;  Surgeon: Julián Tinoco MD;  Location: [facility] MAIN OR;  Service: Orthopedics;  Laterality: Left;    TONSILLECTOMY       Social History     Tobacco Use    Smoking status: Every Day     Current packs/day: 1.00     Average packs/day: 1 pack/day for 42.2 years (42.2 ttl pk-yrs)     Types: Cigarettes     Start date: 1983     Passive exposure: Current    Smokeless tobacco: Never   Vaping Use    Vaping status: Never Used   Substance Use Topics    Alcohol use: Never    Drug use: Never     Family History   Family history unknown: Yes       Review of Systems  ROS  Not obtainable  Objective     Vital Signs  Temp:  [99.7 °F (37.6 °C)-103 °F (39.4 °C)] 101.2 °F (38.4 °C)  Heart Rate:  [78-95] 78  Resp:  [11-20] 17  BP: (115-173)/() 150/88    No intake/output data recorded.  I/O last 3 completed shifts:  In: 600 [IV Piggyback:600]  Out: 350 [Urine:350]    Physical Exam:  Physical  "Exam    General Appearance: Awake, confused, restrained  Skin: warm and dry  HEENT: oral mucosa dry, nonicteric sclera  Neck: supple, no JVD  Lungs: CTA  Heart: RRR, normal S1 and S2  Abdomen: soft, nontender, nondistended  : no palpable bladder  Extremities: no edema, cyanosis or clubbing    Results Review:   I reviewed the patient's new clinical results.    Lab Results   Component Value Date    CALCIUM 8.6 03/25/2025    PHOS 2.0 (L) 03/24/2025     Results from last 7 days   Lab Units 03/25/25  0551 03/24/25  1134   MAGNESIUM mg/dL 2.7* 1.4*   SODIUM mmol/L 141 137   POTASSIUM mmol/L 3.0* 3.0*   CHLORIDE mmol/L 101 89*   CO2 mmol/L 26.0 29.9*   BUN mg/dL 23* 22*   CREATININE mg/dL 1.33* 1.73*   GLUCOSE mg/dL 229* 479*   CALCIUM mg/dL 8.6 10.0   WBC 10*3/mm3 18.34* 16.05*   HEMOGLOBIN g/dL 12.7 13.6   PLATELETS 10*3/mm3 148 187   ALT (SGPT) U/L  --  18   AST (SGOT) U/L  --  65*     Lab Results   Component Value Date    CKTOTAL 112 07/31/2023    TROPONINT 57 (C) 03/24/2025     Estimated Creatinine Clearance: 65.2 mL/min (A) (by C-G formula based on SCr of 1.33 mg/dL (H)).No results found for: \"URICACID\"    Brief Urine Lab Results  (Last result in the past 365 days)        Color   Clarity   Blood   Leuk Est   Nitrite   Protein   CREAT   Urine HCG        03/24/25 1142 Yellow   Cloudy  Comment: Result checked     Large (3+)   Negative   Negative   >=300 mg/dL (3+)                   Prior to Admission medications    Medication Sig Start Date End Date Taking? Authorizing Provider   albuterol (PROVENTIL) (2.5 MG/3ML) 0.083% nebulizer solution Take 2.5 mg by nebulization Every 4 (Four) Hours As Needed for Wheezing for up to 90 days. Indications: Spasm of Lung Air Passages 2/14/25 5/15/25  Barbara Couch APRN   Doxepin HCl 6 MG tablet Take 1 tablet by mouth Daily. 2/14/25   Barbara Couch APRN   famotidine (PEPCID) 20 MG tablet Take 1 tablet by mouth 2 (Two) Times a Day. 2/14/25   Barbara Couch APRN "   fluticasone-salmeterol (ADVAIR HFA) 45-21 MCG/ACT inhaler Inhale 2 puffs 2 (Two) Times a Day. Indications: Asthma 2/14/25   Barbara Couch APRN   Glucagon (Gvoke HypoPen 2-Pack) 1 MG/0.2ML solution auto-injector Inject 1 mg under the skin into the appropriate area as directed 1 (One) Time As Needed (hypoglycemia) for up to 1 dose. 2/14/25   Barbara Couch APRN   hydroCHLOROthiazide 25 MG tablet Take 1 tablet by mouth Daily for 180 days. Indications: Edema 2/14/25 8/13/25  Barbara Couch APRN   hydrOXYzine (ATARAX) 25 MG tablet Take one tablet my mouth twice a day as needed for anxiety  Indications: Feeling Anxious 2/14/25   Barbara Couch APRN   Insulin Glargine (LANTUS SOLOSTAR) 100 UNIT/ML injection pen Inject 20 Units under the skin into the appropriate area as directed Every Night. 2/14/25   Barbara Couch APRN   Insulin Lispro (ADMELOG SOLOSTAR) 100 UNIT/ML injection pen Inject 11 Units under the skin into the appropriate area as directed 3 (Three) Times a Day With Meals. Indications: Type 2 Diabetes 2/14/25   Barbara Couch APRN   metFORMIN (GLUCOPHAGE) 1000 MG tablet Take 1 tablet by mouth Every 12 (Twelve) Hours. Indications: Type 2 Diabetes 2/14/25   Barbara Couch APRN   nebivolol (BYSTOLIC) 10 MG tablet Take 1 tablet by mouth Daily. Indications: High Blood Pressure 2/14/25   Barbara Couch APRN   QUEtiapine (SEROquel) 200 MG tablet Take 1 tablet by mouth every night at bedtime. 2/14/25   Barbara Couch APRN   QUEtiapine (SEROquel) 25 MG tablet Take 1 tablet by mouth Every Night. Indications: Major Depressive Disorder 2/14/25   Barbara Couch APRN       cefTRIAXone, 2,000 mg, Intravenous, Q24H  insulin glargine, 20 Units, Subcutaneous, Nightly  insulin lispro, 2-9 Units, Subcutaneous, Q6H  [Held by provider] nebivolol, 10 mg, Oral, Daily  pantoprazole, 40 mg, Intravenous, Q12H  potassium chloride, 10 mEq, Intravenous, Q1H      Pharmacy to dose vancomycin,   sodium chloride,  125 mL/hr, Last Rate: 125 mL/hr (25 4603)        Assessment & Plan       Acute kidney injury.  Most likely prerenal in etiology.  Patient received IV fluid bolus in ER and now on maintenance IV hydration with normal saline.  Creatinine already improving, 1.3 this morning from peak of 1.73.  Still looks dry clinically  Hypokalemia  RSV infection  Altered mental status.  Seems to be due to metabolic encephalopathy  Alkalosis.  Metabolic and respiratory  Proteinuria.  May have underlying diabetic glomerulosclerosis  Hypovolemia    Plan:  Keep off diuretics and antihypertensives for now  Continue IV hydration with normal saline  IV potassium replacement  Follow cultures  Surveillance labs    I discussed the patients findings and my recommendations with patient and nursing staff    Tyson Villanueva MD  25  10:26 EDT            Electronically signed by Tyson Villanueva MD at 25 1034       Gin Gruber MD at 25 1637        Consult Orders    1. Surgery (on-call MD unless specified) [[id number]] ordered by Marycarmen Tam APRN at 25 1300                   General Surgery Consult Note      Name: Serina Powell ADMIT: 3/24/2025   : 1970  PCP: Barbara Couch APRN    MRN: [id number] LOS: 0 days   AGE/SEX: 54 y.o. female  ROOM:    [facility] ER    Information per chart review and discussion with ED staff as patient is unable to provide history.  Patient is accompanied by her mother at the bedside however she is unsure of how the patient has been leading up to hospitalization.    Patient Care Team:  Barbara Couch APRN as PCP - General (Emergency Medicine)  Chio Salazar MD as Consulting Physician (Infectious Diseases)  [facility] as Primary Care Provider  Nica Pruitt RN as Ambulatory  ([facility])  Alesia Ballard MSW as  (Amb Case Mgmt) ([facility])  Chief Complaint   Patient presents with    Altered Mental Status       HPI  54 y.o. female  brought in by her daughter for altered mental status.  Per report patient's daughter reports she has been disoriented today.  She reports that her mother had been complaining of abdominal pain, nausea, vomiting for the last 2 weeks.  On presentation to the emergency department the patient is completely disoriented and is moaning.  The patient is febrile.  CT scan of the abdomen/pelvis shows a distended gallbladder with gallstones but without evidence of inflammation.  General surgery was consulted given CT scan findings and the fact that the patient had endorsed abdominal pain to her daughter.  Note the patient did test positive for RSV.    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    COPD (chronic obstructive pulmonary disease)     Depression     Diabetes mellitus     Hyperlipidemia     Hypertension     Injury of back     Obesity     Seizures     Sleep apnea     Syncope without other cardiovascular symptoms      Past Surgical History:   Procedure Laterality Date    ADENOIDECTOMY      FOOT WOUND CLOSURE Left     INCISION AND DRAINAGE OF WOUND Left 06/21/2023    Procedure: incison and draiange, left Second and third partial ray resection, applciation of wound vac;  Surgeon: KASIE Donahue DPM;  Location: [facility] OR;  Service: Podiatry;  Laterality: Left;    LAPAROSCOPIC TUBAL LIGATION      ORIF HUMERUS FRACTURE Left 02/10/2021    Procedure: HUMERUS PROXIMAL OPEN REDUCTION INTERNAL FIXATION;  Surgeon: Julián Tinoco MD;  Location: [facility] OR;  Service: Orthopedics;  Laterality: Left;    TONSILLECTOMY       Family History   Family history unknown: Yes       Social History     Tobacco Use    Smoking status: Every Day     Current packs/day: 1.00     Average packs/day: 1 pack/day for 42.2 years (42.2 ttl pk-yrs)     Types: Cigarettes     Start date: 1983     Passive exposure: Current    Smokeless tobacco: Never   Vaping Use    Vaping status: Never Used   Substance Use Topics     Alcohol use: Never    Drug use: Never     (Not in a hospital admission)    [START ON 3/25/2025] cefTRIAXone, 2,000 mg, Intravenous, Q24H  insulin glargine, 20 Units, Subcutaneous, Nightly  insulin lispro, 2-9 Units, Subcutaneous, Q6H  [Held by provider] nebivolol, 10 mg, Oral, Daily  pantoprazole, 40 mg, Intravenous, Q12H  potassium chloride, 10 mEq, Intravenous, Q1H  sodium chloride, 2,000 mL, Intravenous, Once      Pharmacy to dose vancomycin,         albuterol    senna-docusate sodium **AND** polyethylene glycol **AND** bisacodyl **AND** bisacodyl    Calcium Replacement - Follow Nurse / BPA Driven Protocol    dextrose    dextrose    glucagon (human recombinant)    Magnesium Standard Dose Replacement - Follow Nurse / BPA Driven Protocol    Magnesium Standard Dose Replacement - Follow Nurse / BPA Driven Protocol    melatonin    ondansetron ODT **OR** ondansetron    Pharmacy to dose vancomycin    Phosphorus Replacement - Follow Nurse / BPA Driven Protocol    Potassium Replacement - Follow Nurse / BPA Driven Protocol    sodium chloride    [COMPLETED] Insert Peripheral IV **AND** sodium chloride  Acetaminophen, Aspirin, Codeine, Pregabalin, Topiramate, Valdecoxib, Tramadol, Naproxen sodium, and Tylenol with codeine #3 [acetaminophen-codeine]    Review of Systems:   Unable to obtain    Vitals:  Temp:  [100 °F (37.8 °C)-103 °F (39.4 °C)] 103 °F (39.4 °C)  Heart Rate:  [83-94] 91  Resp:  [16-20] 16  BP: (115-173)/() 115/75     Physical Exam:   Alert but disoriented and moaning but not answering questions  Nonlabored respirations  Abdomen soft, obese, without obvious tenderness to palpation and without any guarding on palpation  Extremities with no gross deformities    Labs:  Results from last 7 days   Lab Units 03/24/25  1134   WBC 10*3/mm3 16.05*   HEMOGLOBIN g/dL 13.6   HEMATOCRIT % 40.0   PLATELETS 10*3/mm3 187     Results from last 7 days   Lab Units 03/24/25  1134   SODIUM mmol/L 137   POTASSIUM mmol/L 3.0*    CHLORIDE mmol/L 89*   CO2 mmol/L 29.9*   BUN mg/dL 22*   CREATININE mg/dL 1.73*   CALCIUM mg/dL 10.0   BILIRUBIN mg/dL 0.6   ALK PHOS U/L 75   ALT (SGPT) U/L 18   AST (SGOT) U/L 65*   GLUCOSE mg/dL 479*     Results from last 7 days   Lab Units 03/24/25  1134   INR  1.18*   APTT seconds 27.3       Imaging:  CT abdomen/pelvis 3/24/2025  Impression:  1.Mural thickening involving the visualized distal esophagus consistent with esophagitis.  2.Cholelithiasis with generalized gallbladder distention with no mural thickening or surrounding inflammatory change to suggest acute cholecystitis. Correlate with symptoms.  3.Chronic bilateral L5 spondylolysis without significant spondylolisthesis at L5/S1.     Assessment and Plan:  54 y.o. female admitted for altered mental status and reports of 2-week history of abdominal pain, nausea, vomiting.  Cholelithiasis and distended gallbladder on imaging.    - Clinically does not seem that the patient has acute cholecystitis and generally would not expect fever without more significant inflammation seen on imaging  - Etiology of patient's symptoms is unclear though patient is unable to provide history  - Will order HIDA scan to further evaluate for possible cholecystitis but recommend looking for other sources of infection and may be secondary to RSV  - Further recommendations pending review of HIDA scan once completed    This note was created using Dragon Voice Recognition software.    Gin Gruber MD  03/24/25  16:37 EDT     Electronically signed by Gin Gruber MD at 03/24/25 8986

## 2025-03-25 NOTE — PROGRESS NOTES
[facility] MEDICINE SERVICE  DAILY PROGRESS NOTE    NAME: Serina Powell  : 1970  MRN: [id number]      LOS: 1 day     PROVIDER OF SERVICE: Sarah Kinsey MD    Chief Complaint: Altered mental status    Subjective:     Interval History:  History taken from: patient    No new complaint    Review of Systems:   Review of Systems   All other systems reviewed and are negative.      Objective:     Vital Signs  Temp:  [99.7 °F (37.6 °C)-102.1 °F (38.9 °C)] 101.2 °F (38.4 °C)  Heart Rate:  [78-95] 81  Resp:  [11-18] 14  BP: (115-173)/() 151/84   Body mass index is 48.79 kg/m².    Physical Exam  Physical Exam  Constitutional:       Appearance: Normal appearance.   HENT:      Head: Normocephalic and atraumatic.      Nose: Nose normal.      Mouth/Throat:      Mouth: Mucous membranes are moist.   Eyes:      Extraocular Movements: Extraocular movements intact.      Pupils: Pupils are equal, round, and reactive to light.   Cardiovascular:      Rate and Rhythm: Normal rate and regular rhythm.   Pulmonary:      Effort: Pulmonary effort is normal.      Breath sounds: Normal breath sounds.   Abdominal:      General: Abdomen is flat. Bowel sounds are normal.      Palpations: Abdomen is soft.   Musculoskeletal:      Cervical back: Normal range of motion and neck supple.      Comments: Left lower extremity swelling and erythema involving the shin and lateral aspect of the left leg   Skin:     General: Skin is warm and dry.   Neurological:      Mental Status: She is alert.      Comments: Alert, following directions, confused         Current Medications:  Scheduled Meds:cefTRIAXone, 2,000 mg, Intravenous, Q24H  insulin glargine, 20 Units, Subcutaneous, Nightly  insulin lispro, 2-9 Units, Subcutaneous, Q6H  [Held by provider] nebivolol, 10 mg, Oral, Daily  OLANZapine, 2.5 mg, Oral, BID  pantoprazole, 40 mg, Intravenous, Q12H  potassium chloride, 10 mEq, Intravenous, Q1H      Continuous Infusions:sodium chloride,  125 mL/hr, Last Rate: 125 mL/hr (03/24/25 2333)      PRN Meds:.  albuterol    senna-docusate sodium **AND** polyethylene glycol **AND** bisacodyl **AND** bisacodyl    Calcium Replacement - Follow Nurse / BPA Driven Protocol    dextrose    dextrose    glucagon (human recombinant)    influenza vaccine    Magnesium Standard Dose Replacement - Follow Nurse / BPA Driven Protocol    Magnesium Standard Dose Replacement - Follow Nurse / BPA Driven Protocol    melatonin    ondansetron ODT **OR** ondansetron    Phosphorus Replacement - Follow Nurse / BPA Driven Protocol    Potassium Replacement - Follow Nurse / BPA Driven Protocol    sodium chloride    [COMPLETED] Insert Peripheral IV **AND** sodium chloride       Diagnostic Data    Results from last 7 days   Lab Units 03/25/25  0551 03/24/25  1134   WBC 10*3/mm3 18.34* 16.05*   HEMOGLOBIN g/dL 12.7 13.6   HEMATOCRIT % 39.0 40.0   PLATELETS 10*3/mm3 148 187   GLUCOSE mg/dL 229* 479*   CREATININE mg/dL 1.33* 1.73*   BUN mg/dL 23* 22*   SODIUM mmol/L 141 137   POTASSIUM mmol/L 3.0* 3.0*   AST (SGOT) U/L  --  65*   ALT (SGPT) U/L  --  18   ALK PHOS U/L  --  75   BILIRUBIN mg/dL  --  0.6   ANION GAP mmol/L 14.0 18.1*       CT Abdomen Pelvis With Contrast  Result Date: 3/24/2025  Impression: 1.Mural thickening involving the visualized distal esophagus consistent with esophagitis. 2.Cholelithiasis with generalized gallbladder distention with no mural thickening or surrounding inflammatory change to suggest acute cholecystitis. Correlate with symptoms. 3.Chronic bilateral L5 spondylolysis without significant spondylolisthesis at L5/S1. Electronically Signed: Rigo Argueta MD  3/24/2025 12:57 PM EDT  Workstation ID: [id number]    CT Head Without Contrast  Result Date: 3/24/2025  Impression: No acute intracranial findings by CT. Electronically Signed: Elpidio Chavis MD  3/24/2025 12:54 PM EDT  Workstation ID: [id number]    XR Chest 1 View  Result Date: 3/24/2025  Impression: No acute  process. Electronically Signed: Agus Johnson MD  3/24/2025 12:28 PM EDT  Workstation ID: [id number]        I reviewed the patient's new clinical results.    Assessment/Plan:     Active and Resolved Problems  Active Hospital Problems    Diagnosis  POA    **Altered mental status [R41.82]  Yes      Resolved Hospital Problems   No resolved problems to display.       Left lower extremity cellulitis  Sepsis  AMS with agitation  Cholelithiasis with gallbladder wall distention  Esophagitis  RSV infection  WILLIAM  Metabolic acidosis  Hypokalemia  Hypomagnesemia  Diabetes mellitus type 2  Hypertension        -Patient still having fevers.  Blood cultures negative so far.  MRSA PCR negative.  Continue IV ceftriaxone.  Continue to follow blood cultures.  Last procalcitonin was elevated at 3.29.  Monitor.  Follow-up on Dopplers ordered to rule out DVT.    -Cholelithiasis with gallbladder wall distention:-General Surgery has ordered a HIDA scan to rule out acute cholecystitis.  Add IV Flagyl since patient still having recurrent fevers with Tmax of 101.3 today and Tmax of 101.9 yesterday.  Flagyl can be discontinued once acute cholecystitis has been ruled out.    -Patient confused and in 2 point restraints due to agitation.  Start olanzapine.  Discontinue restraints.    - Continue Protonix for esophagitis demonstrated on CAT scan    - Supportive care for RSV infection.  Start droplet isolation.    - Renal function is improving.  Nephrology following.  Continue to monitor.    - Metabolic acidosis is improving.  Continue to monitor acid-base status.    - Replace potassium for hypokalemia    -Hypomagnesemia is improved    - Continue current insulin regimen, monitor the blood glucose trend with regard to diabetes mellitus.  Hemoglobin A1c is 8.06% done last month February 2025    -  Continue current blood pressure medication regimen.    - Continue current management.    VTE Prophylaxis:  Mechanical VTE prophylaxis orders are present.              Disposition Planning:     Barriers to Discharge: Pending clinical improvement  Anticipated Date of Discharge: 3/31/2025  Place of Discharge: Home versus SNF          Code Status and Medical Interventions: CPR (Attempt to Resuscitate); Full Support   Ordered at: 03/24/25 1459     Code Status (Patient has no pulse and is not breathing):    CPR (Attempt to Resuscitate)     Medical Interventions (Patient has pulse or is breathing):    Full Support       Signature: Electronically signed by Sarah Kinsey MD, 03/25/25, 12:07 EDT.  [facility] Hospitalist Team

## 2025-03-26 ENCOUNTER — APPOINTMENT (OUTPATIENT)
Dept: NUCLEAR MEDICINE | Facility: HOSPITAL | Age: 55
End: 2025-03-26
Payer: MEDICAID

## 2025-03-26 LAB
ALBUMIN SERPL-MCNC: 3 G/DL (ref 3.5–5.2)
ANION GAP SERPL CALCULATED.3IONS-SCNC: 10.7 MMOL/L (ref 5–15)
BASOPHILS # BLD AUTO: 0.05 10*3/MM3 (ref 0–0.2)
BASOPHILS NFR BLD AUTO: 0.3 % (ref 0–1.5)
BUN SERPL-MCNC: 22 MG/DL (ref 6–20)
BUN/CREAT SERPL: 18.8 (ref 7–25)
CALCIUM SPEC-SCNC: 7.8 MG/DL (ref 8.6–10.5)
CHLORIDE SERPL-SCNC: 99 MMOL/L (ref 98–107)
CO2 SERPL-SCNC: 25.3 MMOL/L (ref 22–29)
CREAT SERPL-MCNC: 1.17 MG/DL (ref 0.57–1)
DEPRECATED RDW RBC AUTO: 41.8 FL (ref 37–54)
EGFRCR SERPLBLD CKD-EPI 2021: 54.9 ML/MIN/1.73
EOSINOPHIL # BLD AUTO: 0.02 10*3/MM3 (ref 0–0.4)
EOSINOPHIL NFR BLD AUTO: 0.1 % (ref 0.3–6.2)
ERYTHROCYTE [DISTWIDTH] IN BLOOD BY AUTOMATED COUNT: 12.9 % (ref 12.3–15.4)
GLUCOSE BLDC GLUCOMTR-MCNC: 188 MG/DL (ref 70–105)
GLUCOSE BLDC GLUCOMTR-MCNC: 191 MG/DL (ref 70–105)
GLUCOSE BLDC GLUCOMTR-MCNC: 212 MG/DL (ref 70–105)
GLUCOSE BLDC GLUCOMTR-MCNC: 267 MG/DL (ref 70–105)
GLUCOSE SERPL-MCNC: 201 MG/DL (ref 65–99)
HCT VFR BLD AUTO: 35 % (ref 34–46.6)
HGB BLD-MCNC: 11.3 G/DL (ref 12–15.9)
IMM GRANULOCYTES # BLD AUTO: 0.38 10*3/MM3 (ref 0–0.05)
IMM GRANULOCYTES NFR BLD AUTO: 2.4 % (ref 0–0.5)
LYMPHOCYTES # BLD AUTO: 1.54 10*3/MM3 (ref 0.7–3.1)
LYMPHOCYTES NFR BLD AUTO: 9.7 % (ref 19.6–45.3)
MAGNESIUM SERPL-MCNC: 2.2 MG/DL (ref 1.6–2.6)
MCH RBC QN AUTO: 28.6 PG (ref 26.6–33)
MCHC RBC AUTO-ENTMCNC: 32.3 G/DL (ref 31.5–35.7)
MCV RBC AUTO: 88.6 FL (ref 79–97)
MONOCYTES # BLD AUTO: 1.02 10*3/MM3 (ref 0.1–0.9)
MONOCYTES NFR BLD AUTO: 6.4 % (ref 5–12)
NEUTROPHILS NFR BLD AUTO: 12.88 10*3/MM3 (ref 1.7–7)
NEUTROPHILS NFR BLD AUTO: 81.1 % (ref 42.7–76)
NRBC BLD AUTO-RTO: 0 /100 WBC (ref 0–0.2)
PHOSPHATE SERPL-MCNC: 1.7 MG/DL (ref 2.5–4.5)
PHOSPHATE SERPL-MCNC: 1.9 MG/DL (ref 2.5–4.5)
PLATELET # BLD AUTO: 137 10*3/MM3 (ref 140–450)
PMV BLD AUTO: 11.4 FL (ref 6–12)
POTASSIUM SERPL-SCNC: 3 MMOL/L (ref 3.5–5.2)
POTASSIUM SERPL-SCNC: 3.8 MMOL/L (ref 3.5–5.2)
RBC # BLD AUTO: 3.95 10*6/MM3 (ref 3.77–5.28)
SODIUM SERPL-SCNC: 135 MMOL/L (ref 136–145)
WBC NRBC COR # BLD AUTO: 15.89 10*3/MM3 (ref 3.4–10.8)

## 2025-03-26 PROCEDURE — 25010000002 LORAZEPAM PER 2 MG: Performed by: FAMILY MEDICINE

## 2025-03-26 PROCEDURE — 25010000002 POTASSIUM CHLORIDE 10 MEQ/100ML SOLUTION: Performed by: INTERNAL MEDICINE

## 2025-03-26 PROCEDURE — 25010000002 CEFTRIAXONE PER 250 MG: Performed by: INTERNAL MEDICINE

## 2025-03-26 PROCEDURE — 94799 UNLISTED PULMONARY SVC/PX: CPT

## 2025-03-26 PROCEDURE — A9537 TC99M MEBROFENIN: HCPCS | Performed by: INTERNAL MEDICINE

## 2025-03-26 PROCEDURE — 25810000003 SODIUM CHLORIDE 0.9 % SOLUTION: Performed by: INTERNAL MEDICINE

## 2025-03-26 PROCEDURE — 92526 ORAL FUNCTION THERAPY: CPT

## 2025-03-26 PROCEDURE — 82948 REAGENT STRIP/BLOOD GLUCOSE: CPT

## 2025-03-26 PROCEDURE — 85025 COMPLETE CBC W/AUTO DIFF WBC: CPT

## 2025-03-26 PROCEDURE — 84100 ASSAY OF PHOSPHORUS: CPT | Performed by: INTERNAL MEDICINE

## 2025-03-26 PROCEDURE — 80069 RENAL FUNCTION PANEL: CPT | Performed by: INTERNAL MEDICINE

## 2025-03-26 PROCEDURE — 63710000001 INSULIN GLARGINE PER 5 UNITS

## 2025-03-26 PROCEDURE — 94664 DEMO&/EVAL PT USE INHALER: CPT

## 2025-03-26 PROCEDURE — 34310000005 TECHNETIUM TC 99M MEBROFENIN KIT: Performed by: INTERNAL MEDICINE

## 2025-03-26 PROCEDURE — 25010000002 METRONIDAZOLE 500 MG/100ML SOLUTION: Performed by: INTERNAL MEDICINE

## 2025-03-26 PROCEDURE — 78226 HEPATOBILIARY SYSTEM IMAGING: CPT

## 2025-03-26 PROCEDURE — 99231 SBSQ HOSP IP/OBS SF/LOW 25: CPT | Performed by: SURGERY

## 2025-03-26 PROCEDURE — 94761 N-INVAS EAR/PLS OXIMETRY MLT: CPT

## 2025-03-26 PROCEDURE — 63710000001 INSULIN LISPRO (HUMAN) PER 5 UNITS: Performed by: INTERNAL MEDICINE

## 2025-03-26 PROCEDURE — 83735 ASSAY OF MAGNESIUM: CPT

## 2025-03-26 PROCEDURE — 63710000001 INSULIN LISPRO (HUMAN) PER 5 UNITS

## 2025-03-26 PROCEDURE — 84132 ASSAY OF SERUM POTASSIUM: CPT | Performed by: INTERNAL MEDICINE

## 2025-03-26 RX ORDER — INSULIN LISPRO 100 [IU]/ML
11 INJECTION, SOLUTION INTRAVENOUS; SUBCUTANEOUS
Status: DISCONTINUED | OUTPATIENT
Start: 2025-03-26 | End: 2025-03-28 | Stop reason: HOSPADM

## 2025-03-26 RX ORDER — FENTANYL/ROPIVACAINE/NS/PF 2-625MCG/1
15 PLASTIC BAG, INJECTION (ML) EPIDURAL ONCE
Status: COMPLETED | OUTPATIENT
Start: 2025-03-26 | End: 2025-03-26

## 2025-03-26 RX ORDER — PANTOPRAZOLE SODIUM 40 MG/1
40 TABLET, DELAYED RELEASE ORAL
Status: CANCELLED | OUTPATIENT
Start: 2025-03-27

## 2025-03-26 RX ORDER — QUETIAPINE FUMARATE 50 MG/1
200 TABLET, EXTENDED RELEASE ORAL NIGHTLY
Status: CANCELLED | OUTPATIENT
Start: 2025-03-26

## 2025-03-26 RX ORDER — PANTOPRAZOLE SODIUM 40 MG/1
40 TABLET, DELAYED RELEASE ORAL
Status: DISCONTINUED | OUTPATIENT
Start: 2025-03-26 | End: 2025-03-28 | Stop reason: HOSPADM

## 2025-03-26 RX ORDER — POTASSIUM CHLORIDE 7.45 MG/ML
10 INJECTION INTRAVENOUS
Status: DISCONTINUED | OUTPATIENT
Start: 2025-03-26 | End: 2025-03-26

## 2025-03-26 RX ORDER — POTASSIUM CHLORIDE 1500 MG/1
40 TABLET, EXTENDED RELEASE ORAL ONCE
Status: COMPLETED | OUTPATIENT
Start: 2025-03-26 | End: 2025-03-26

## 2025-03-26 RX ORDER — QUETIAPINE FUMARATE 25 MG/1
25 TABLET, FILM COATED ORAL NIGHTLY
Status: CANCELLED | OUTPATIENT
Start: 2025-03-26

## 2025-03-26 RX ORDER — SODIUM CHLORIDE 9 MG/ML
100 INJECTION, SOLUTION INTRAVENOUS CONTINUOUS
Status: DISPENSED | OUTPATIENT
Start: 2025-03-26 | End: 2025-03-27

## 2025-03-26 RX ORDER — QUETIAPINE FUMARATE 25 MG/1
25 TABLET, FILM COATED ORAL NIGHTLY
Status: DISCONTINUED | OUTPATIENT
Start: 2025-03-26 | End: 2025-03-28 | Stop reason: HOSPADM

## 2025-03-26 RX ORDER — KIT FOR THE PREPARATION OF TECHNETIUM TC 99M MEBROFENIN 45 MG/10ML
1 INJECTION, POWDER, LYOPHILIZED, FOR SOLUTION INTRAVENOUS
Status: COMPLETED | OUTPATIENT
Start: 2025-03-26 | End: 2025-03-26

## 2025-03-26 RX ORDER — LORAZEPAM 2 MG/ML
1 INJECTION INTRAMUSCULAR ONCE
Status: COMPLETED | OUTPATIENT
Start: 2025-03-26 | End: 2025-03-26

## 2025-03-26 RX ORDER — QUETIAPINE FUMARATE 100 MG/1
200 TABLET, FILM COATED ORAL NIGHTLY
Status: DISCONTINUED | OUTPATIENT
Start: 2025-03-26 | End: 2025-03-28 | Stop reason: HOSPADM

## 2025-03-26 RX ORDER — IBUPROFEN 600 MG/1
600 TABLET, FILM COATED ORAL ONCE
Status: COMPLETED | OUTPATIENT
Start: 2025-03-26 | End: 2025-03-26

## 2025-03-26 RX ADMIN — POTASSIUM CHLORIDE 10 MEQ: 7.46 INJECTION, SOLUTION INTRAVENOUS at 07:10

## 2025-03-26 RX ADMIN — POTASSIUM CHLORIDE 10 MEQ: 7.46 INJECTION, SOLUTION INTRAVENOUS at 05:48

## 2025-03-26 RX ADMIN — METRONIDAZOLE 500 MG: 500 INJECTION, SOLUTION INTRAVENOUS at 20:51

## 2025-03-26 RX ADMIN — INSULIN LISPRO 4 UNITS: 100 INJECTION, SOLUTION INTRAVENOUS; SUBCUTANEOUS at 01:36

## 2025-03-26 RX ADMIN — OLANZAPINE 2.5 MG: 5 TABLET, ORALLY DISINTEGRATING ORAL at 10:46

## 2025-03-26 RX ADMIN — QUETIAPINE FUMARATE 25 MG: 25 TABLET ORAL at 20:53

## 2025-03-26 RX ADMIN — IBUPROFEN 600 MG: 600 TABLET, FILM COATED ORAL at 20:52

## 2025-03-26 RX ADMIN — Medication 2 PACKET: at 03:52

## 2025-03-26 RX ADMIN — LORAZEPAM 1 MG: 2 INJECTION INTRAMUSCULAR; INTRAVENOUS at 03:52

## 2025-03-26 RX ADMIN — IPRATROPIUM BROMIDE AND ALBUTEROL SULFATE 3 ML: .5; 3 SOLUTION RESPIRATORY (INHALATION) at 16:09

## 2025-03-26 RX ADMIN — PANTOPRAZOLE SODIUM 40 MG: 40 TABLET, DELAYED RELEASE ORAL at 17:56

## 2025-03-26 RX ADMIN — BUDESONIDE AND FORMOTEROL FUMARATE DIHYDRATE 2 PUFF: 160; 4.5 AEROSOL RESPIRATORY (INHALATION) at 08:51

## 2025-03-26 RX ADMIN — POTASSIUM CHLORIDE 10 MEQ: 7.46 INJECTION, SOLUTION INTRAVENOUS at 03:52

## 2025-03-26 RX ADMIN — PANTOPRAZOLE SODIUM 40 MG: 40 INJECTION, POWDER, LYOPHILIZED, FOR SOLUTION INTRAVENOUS at 10:46

## 2025-03-26 RX ADMIN — METRONIDAZOLE 500 MG: 500 INJECTION, SOLUTION INTRAVENOUS at 14:58

## 2025-03-26 RX ADMIN — MEBROFENIN 1 DOSE: 45 INJECTION, POWDER, LYOPHILIZED, FOR SOLUTION INTRAVENOUS at 12:27

## 2025-03-26 RX ADMIN — CEFTRIAXONE 2000 MG: 2 INJECTION, POWDER, FOR SOLUTION INTRAMUSCULAR; INTRAVENOUS at 14:58

## 2025-03-26 RX ADMIN — Medication 10 ML: at 03:56

## 2025-03-26 RX ADMIN — INSULIN GLARGINE 20 UNITS: 100 INJECTION, SOLUTION SUBCUTANEOUS at 20:53

## 2025-03-26 RX ADMIN — POTASSIUM CHLORIDE 10 MEQ: 7.46 INJECTION, SOLUTION INTRAVENOUS at 05:04

## 2025-03-26 RX ADMIN — METRONIDAZOLE 500 MG: 500 INJECTION, SOLUTION INTRAVENOUS at 05:04

## 2025-03-26 RX ADMIN — POTASSIUM CHLORIDE 40 MEQ: 1500 TABLET, EXTENDED RELEASE ORAL at 10:46

## 2025-03-26 RX ADMIN — INSULIN LISPRO 11 UNITS: 100 INJECTION, SOLUTION INTRAVENOUS; SUBCUTANEOUS at 17:56

## 2025-03-26 RX ADMIN — SODIUM CHLORIDE 125 ML/HR: 9 INJECTION, SOLUTION INTRAVENOUS at 01:57

## 2025-03-26 RX ADMIN — SODIUM CHLORIDE 100 ML/HR: 9 INJECTION, SOLUTION INTRAVENOUS at 14:58

## 2025-03-26 RX ADMIN — Medication 15 MMOL: at 17:30

## 2025-03-26 RX ADMIN — IPRATROPIUM BROMIDE AND ALBUTEROL SULFATE 3 ML: .5; 3 SOLUTION RESPIRATORY (INHALATION) at 20:20

## 2025-03-26 RX ADMIN — INSULIN LISPRO 2 UNITS: 100 INJECTION, SOLUTION INTRAVENOUS; SUBCUTANEOUS at 17:56

## 2025-03-26 RX ADMIN — QUETIAPINE FUMARATE 200 MG: 100 TABLET ORAL at 20:52

## 2025-03-26 NOTE — PROGRESS NOTES
[facility] MEDICINE SERVICE  DAILY PROGRESS NOTE    NAME: Serina Powell  : 1970  MRN: [id number]      LOS: 2 days     PROVIDER OF SERVICE: Sarah Kinsey MD    Chief Complaint: Altered mental status    Subjective:     Interval History:  History taken from: patient    Not complaining, confused    Review of Systems:   Review of Systems   Unable to perform ROS: Mental status change   All other systems reviewed and are negative.      Objective:     Vital Signs  Temp:  [98.2 °F (36.8 °C)-100.3 °F (37.9 °C)] 98.9 °F (37.2 °C)  Heart Rate:  [] 73  Resp:  [16-27] 19  BP: (133-153)/(53-93) 134/71   Body mass index is 47.88 kg/m².    Physical Exam  Physical Exam  Constitutional:       Appearance: Normal appearance.   HENT:      Head: Normocephalic and atraumatic.      Nose: Nose normal.      Mouth/Throat:      Mouth: Mucous membranes are moist.   Eyes:      Extraocular Movements: Extraocular movements intact.      Pupils: Pupils are equal, round, and reactive to light.   Cardiovascular:      Rate and Rhythm: Normal rate and regular rhythm.   Pulmonary:      Effort: Pulmonary effort is normal.      Breath sounds: Normal breath sounds.   Abdominal:      General: Abdomen is flat. Bowel sounds are normal.      Palpations: Abdomen is soft.   Musculoskeletal:      Cervical back: Normal range of motion and neck supple.      Comments: Left lower extremity swelling and erythema involving the shin and lateral aspect of the left leg   Skin:     General: Skin is warm and dry.   Neurological:      Mental Status: She is alert.      Comments: Alert, following directions, confused         Current Medications:  Scheduled Meds:budesonide-formoterol, 2 puff, Inhalation, BID - RT  cefTRIAXone, 2,000 mg, Intravenous, Q24H  insulin glargine, 20 Units, Subcutaneous, Nightly  insulin lispro, 11 Units, Subcutaneous, TID With Meals  insulin lispro, 2-9 Units, Subcutaneous, Q6H  ipratropium-albuterol, 3 mL, Nebulization,  4x Daily - RT  metroNIDAZOLE, 500 mg, Intravenous, Q8H  [Held by provider] nebivolol, 10 mg, Oral, Daily  pantoprazole, 40 mg, Oral, BID AC  QUEtiapine, 25 mg, Oral, Nightly   And  QUEtiapine, 200 mg, Oral, Nightly      Continuous Infusions:sodium chloride, 125 mL/hr, Last Rate: 125 mL/hr (03/26/25 0157)      PRN Meds:.  albuterol    senna-docusate sodium **AND** polyethylene glycol **AND** bisacodyl **AND** bisacodyl    Calcium Replacement - Follow Nurse / BPA Driven Protocol    dextrose    dextrose    glucagon (human recombinant)    influenza vaccine    Magnesium Standard Dose Replacement - Follow Nurse / BPA Driven Protocol    Magnesium Standard Dose Replacement - Follow Nurse / BPA Driven Protocol    melatonin    ondansetron ODT **OR** ondansetron    Phosphorus Replacement - Follow Nurse / BPA Driven Protocol    Potassium Replacement - Follow Nurse / BPA Driven Protocol    sodium chloride    [COMPLETED] Insert Peripheral IV **AND** sodium chloride       Diagnostic Data    Results from last 7 days   Lab Units 03/26/25  0405 03/26/25  0220 03/25/25  0551 03/24/25  1134   WBC 10*3/mm3 15.89*  --    < > 16.05*   HEMOGLOBIN g/dL 11.3*  --    < > 13.6   HEMATOCRIT % 35.0  --    < > 40.0   PLATELETS 10*3/mm3 137*  --    < > 187   GLUCOSE mg/dL  --  201*   < > 479*   CREATININE mg/dL  --  1.17*   < > 1.73*   BUN mg/dL  --  22*   < > 22*   SODIUM mmol/L  --  135*   < > 137   POTASSIUM mmol/L  --  3.0*   < > 3.0*   AST (SGOT) U/L  --   --   --  65*   ALT (SGPT) U/L  --   --   --  18   ALK PHOS U/L  --   --   --  75   BILIRUBIN mg/dL  --   --   --  0.6   ANION GAP mmol/L  --  10.7   < > 18.1*    < > = values in this interval not displayed.       NM HIDA SCAN WITHOUT PHARMACOLOGICAL INTERVENTION  Result Date: 3/26/2025  Impression: Negative for acute calculus cholecystitis. Electronically Signed: Elpidio Chavis MD  3/26/2025 1:32 PM EDT  Workstation ID: [id number]    US Abdomen Limited  Result Date: 3/25/2025  Impression:  1.Cholelithiasis with a positive sonographic Lala sign, but no abnormal gallbladder wall thickening or pericholecystic fluid. Findings are equivocal for acute cholecystitis. 2.Hepatomegaly with hepatic steatosis. Electronically Signed: Robles Douglas  3/25/2025 6:10 PM EDT  Workstation ID: [id number]        I reviewed the patient's new clinical results.    Assessment/Plan:     Active and Resolved Problems  Active Hospital Problems    Diagnosis  POA    **Altered mental status [R41.82]  Yes      Resolved Hospital Problems   No resolved problems to display.       Left lower extremity cellulitis  Sepsis  AMS with agitation  Cholelithiasis with gallbladder wall distention  Esophagitis  RSV infection  WILLIAM  Metabolic acidosis  Hypokalemia  Hypomagnesemia  Diabetes mellitus type 2  Hypertension        - No significant fever today.  Blood cultures negative so far.  MRSA PCR negative.  Continue IV ceftriaxone.  Continue to follow blood cultures.  Procalcitonin was elevated at 3.29 but has trended down to 2.75.  Monitor.  Follow-up on Dopplers ordered to rule out DVT.    -Cholelithiasis with gallbladder wall distention:-General Surgery has ordered a HIDA scan to rule out acute cholecystitis.  Add IV Flagyl since patient still having recurrent fevers with Tmax of 101.3 today and Tmax of 101.9 yesterday.  Flagyl can be discontinued once acute cholecystitis has been ruled out.    - Continue Protonix for esophagitis demonstrated on CAT scan    - Supportive care for RSV infection.  Start droplet isolation.    - Renal function is improving.  Nephrology following.  Continue to monitor.    - Metabolic acidosis is improving.  Continue to monitor acid-base status.    - Replace potassium for hypokalemia    -Hypomagnesemia is improved    - Continue current insulin regimen, monitor the blood glucose trend with regard to diabetes mellitus.  Hemoglobin A1c is 8.06% done last month February 2025    -  Continue current blood pressure  medication regimen.    - Continue current management.    VTE Prophylaxis:  Mechanical VTE prophylaxis orders are present.             Disposition Planning:     Barriers to Discharge: Pending clinical improvement  Anticipated Date of Discharge: 3/31/2025  Place of Discharge: Home versus SNF          Code Status and Medical Interventions: CPR (Attempt to Resuscitate); Full Support   Ordered at: 03/24/25 1459     Code Status (Patient has no pulse and is not breathing):    CPR (Attempt to Resuscitate)     Medical Interventions (Patient has pulse or is breathing):    Full Support       Signature: Electronically signed by Sarah Kinsey MD, 03/26/25, 14:17 EDT.  [facility] Hospitalist Team

## 2025-03-26 NOTE — PROGRESS NOTES
[facility] Progress Note      Patient Name: Serina Powell  : 1970  MRN: [id number]  Primary Care Physician:  Barbara Couch APRN  Date of admission: 3/24/2025    Subjective     Interval History:     Patient more awake and able to answer simple questions this morning  No acute distress overnight  Denies any chest pain, nausea or vomiting    Review of Systems:   As noted above    Objective     Vitals:   Temp:  [98.2 °F (36.8 °C)-101.2 °F (38.4 °C)] 99.7 °F (37.6 °C)  Heart Rate:  [76-85] 83  Resp:  [14-27] 27  BP: (133-153)/(53-93) 133/73    Intake/Output Summary (Last 24 hours) at 3/26/2025 0826  Last data filed at 3/26/2025 0506  Gross per 24 hour   Intake --   Output 475 ml   Net -475 ml       Physical Exam:    General Appearance: Chronically ill-appearing  HEENT: oral mucosa dry, nonicteric sclera  Neck: supple, no JVD  Lungs: CTA  Heart: RRR, normal S1 and S2  Abdomen: soft, nondistended  Extremities: no edema  Neuro: Awake alert and moving all extremities    Scheduled Meds:     budesonide-formoterol, 2 puff, Inhalation, BID - RT  cefTRIAXone, 2,000 mg, Intravenous, Q24H  insulin glargine, 20 Units, Subcutaneous, Nightly  insulin lispro, 2-9 Units, Subcutaneous, Q6H  ipratropium-albuterol, 3 mL, Nebulization, 4x Daily - RT  metroNIDAZOLE, 500 mg, Intravenous, Q8H  [Held by provider] nebivolol, 10 mg, Oral, Daily  OLANZapine zydis, 2.5 mg, Oral, BID  pantoprazole, 40 mg, Intravenous, Q12H  potassium chloride, 10 mEq, Intravenous, Q1H      IV Meds:   sodium chloride, 125 mL/hr, Last Rate: 125 mL/hr (25 0157)        Results Reviewed:   I have personally reviewed the results from the time of this admission to 3/26/2025 08:26 EDT     Results from last 7 days   Lab Units 25  0220 25  0551 25  1134   SODIUM mmol/L 135* 141 137   POTASSIUM mmol/L 3.0* 3.0* 3.0*   CHLORIDE mmol/L 99 101 89*   CO2 mmol/L 25.3 26.0 29.9*   BUN mg/dL 22* 23* 22*   CREATININE  mg/dL 1.17* 1.33* 1.73*   CALCIUM mg/dL 7.8* 8.6 10.0   BILIRUBIN mg/dL  --   --  0.6   ALK PHOS U/L  --   --  75   ALT (SGPT) U/L  --   --  18   AST (SGOT) U/L  --   --  65*   GLUCOSE mg/dL 201* 229* 479*     Estimated Creatinine Clearance: 73.4 mL/min (A) (by C-G formula based on SCr of 1.17 mg/dL (H)).  Results from last 7 days   Lab Units 03/26/25  0220 03/25/25  0551 03/24/25  1134   MAGNESIUM mg/dL 2.2 2.7* 1.4*   PHOSPHORUS mg/dL 1.7* 2.5 2.0*         Results from last 7 days   Lab Units 03/26/25  0405 03/25/25  0551 03/24/25  1134   WBC 10*3/mm3 15.89* 18.34* 16.05*   HEMOGLOBIN g/dL 11.3* 12.7 13.6   PLATELETS 10*3/mm3 137* 148 187     Results from last 7 days   Lab Units 03/24/25  1134   INR  1.18*       Assessment / Plan     ASSESSMENT:    Acute kidney injury.  Most likely prerenal in etiology.  Renal function improving with IV hydration.  Creatinine 1.17 this morning from peak of 1.73.    Hypokalemia  RSV infection  Altered mental status.  Seems to be due to metabolic encephalopathy  Alkalosis.  Metabolic and respiratory.  Improving  Proteinuria.  May have underlying diabetic glomerulosclerosis  Hypovolemia    PLAN:    Continue IV fluids  Patient receiving IV potassium replacement  Patient will benefit from ACE inhibitor or ARB for proteinuria    Tyson Villanueva MD  03/26/25  08:26 EDT    [facility]  [phone]

## 2025-03-26 NOTE — PLAN OF CARE
Goal Outcome Evaluation:  Plan of Care Reviewed With: patient        Progress: improving  Outcome Evaluation: Pt has been very agitated since before shift change. Pt was noted off restraints, with a sitter at bedside, but very confused, agitated and trying to get out of bed. This nurse reached out to Hospitalist on call, order was given for Geodon it did not work, reached out Hospitalist again 2 hours later gave order for Haldol within the hour medication was still not working and pt continued to try to get up moving left leg/arm up and holding on to bed rails. This nurse reached out to Hospitalist again, gave a one time dose of Ativan. Ativan was able to calm pt down where she is not as agitated, every now and then will try to get up but not as much as before. Patient is able to keep her monitor leads and appears to be getting some rest. Pt has had a repeat Potassium lab due 1830 yesterday 3/25 pt has been agitated and unable to cooperate with blood draw. Vitals wnl, sitter at bedside, call light within reach.

## 2025-03-26 NOTE — NURSING NOTE
Pt is agitated once again, trying to get up, pulling monitor leads, pulling lines and her gown. Reached out to Hospitalist gave an order for Ativan x1 dose.

## 2025-03-26 NOTE — PROGRESS NOTES
General Surgery Inpatient Progress Note      Name: Serina Powell ADMIT: 3/24/2025   : 1970  PCP: Barbara Couch APRN    MRN: [id number] LOS: 2 days   AGE/SEX: 54 y.o. female  ROOM: [address] [place] [address]      Patient Care Team:  Barbara Couch APRN as PCP - General (Emergency Medicine)  Chio Salazar MD as Consulting Physician (Infectious Diseases)  [facility] as Primary Care Provider  Nica Pruitt RN as Ambulatory  ([facility])  Chief Complaint   Patient presents with    Altered Mental Status       Subjective:  Patient seen and examined.  Less agitated per nursing.  Has not complained of pain.    Medications:  budesonide-formoterol, 2 puff, Inhalation, BID - RT  cefTRIAXone, 2,000 mg, Intravenous, Q24H  insulin glargine, 20 Units, Subcutaneous, Nightly  insulin lispro, 11 Units, Subcutaneous, TID With Meals  insulin lispro, 2-9 Units, Subcutaneous, Q6H  ipratropium-albuterol, 3 mL, Nebulization, 4x Daily - RT  metroNIDAZOLE, 500 mg, Intravenous, Q8H  [Held by provider] nebivolol, 10 mg, Oral, Daily  pantoprazole, 40 mg, Oral, BID AC  QUEtiapine, 25 mg, Oral, Nightly   And  QUEtiapine, 200 mg, Oral, Nightly         albuterol    senna-docusate sodium **AND** polyethylene glycol **AND** bisacodyl **AND** bisacodyl    Calcium Replacement - Follow Nurse / BPA Driven Protocol    dextrose    dextrose    glucagon (human recombinant)    influenza vaccine    Magnesium Standard Dose Replacement - Follow Nurse / BPA Driven Protocol    Magnesium Standard Dose Replacement - Follow Nurse / BPA Driven Protocol    melatonin    ondansetron ODT **OR** ondansetron    Phosphorus Replacement - Follow Nurse / BPA Driven Protocol    Potassium Replacement - Follow Nurse / BPA Driven Protocol    sodium chloride    [COMPLETED] Insert Peripheral IV **AND** sodium chloride     Vitals:  Temp:  [98.2 °F (36.8 °C)-100.3 °F (37.9 °C)] 98.9 °F (37.2 °C)  Heart Rate:  [] 73  Resp:  [16-27]  19  BP: (133-153)/(53-93) 134/71     Physical Exam:  No acute distress, alert but not answering questions appropriately  Nonlabored respirations  Abdomen soft, obese, nontender to palpation  Left lower extremity with erythema to just below the knee and edema    Labs:  Results from last 7 days   Lab Units 03/26/25  0405 03/25/25  0551 03/24/25  1134   WBC 10*3/mm3 15.89* 18.34* 16.05*   HEMOGLOBIN g/dL 11.3* 12.7 13.6   HEMATOCRIT % 35.0 39.0 40.0   PLATELETS 10*3/mm3 137* 148 187     Results from last 7 days   Lab Units 03/26/25  0220 03/25/25  0551 03/24/25  1134   SODIUM mmol/L 135* 141 137   POTASSIUM mmol/L 3.0* 3.0* 3.0*   CHLORIDE mmol/L 99 101 89*   CO2 mmol/L 25.3 26.0 29.9*   BUN mg/dL 22* 23* 22*   CREATININE mg/dL 1.17* 1.33* 1.73*   CALCIUM mg/dL 7.8* 8.6 10.0   BILIRUBIN mg/dL  --   --  0.6   ALK PHOS U/L  --   --  75   ALT (SGPT) U/L  --   --  18   AST (SGOT) U/L  --   --  65*   GLUCOSE mg/dL 201* 229* 479*     Results from last 7 days   Lab Units 03/24/25  1134   INR  1.18*   APTT seconds 27.3     Imaging:  HIDA scan 3/26/2025  Findings:  Expected hepatic tracer accumulation and partial clearance. Early tracer accumulation in the gallbladder and biliary tree arguing against acute calculus cholecystitis and biliary obstruction respectively. No visualized gastric reflux.     IMPRESSION:  Impression:  Negative for acute calculus cholecystitis.    Assessment and Plan:  54 y.o. female admitted for altered mental status and reports of 2-week history of abdominal pain, nausea, vomiting.  Cholelithiasis and distended gallbladder on imaging.     -HIDA scan negative for acute cholecystitis and clinically patient does not appear to have acute cholecystitis as she is completely nontender to palpation in right upper quadrant   -Etiology of patient's altered mental status is not completely clear at this time but is not due to acute cholecystitis   -No indication for any surgical intervention from my standpoint    -Continue supportive care     This note was created using Dragon Voice Recognition software.    Gin Gruber MD  03/26/25  13:42 EDT

## 2025-03-26 NOTE — CASE MANAGEMENT/SOCIAL WORK
Continued Stay Note  DONTE Jha     Patient Name: Serina Powell  MRN: [id number]  Today's Date: 3/26/2025    Admit Date: 3/24/2025    Plan: Anticipate return home with family.   Discharge Plan       Row Name 03/26/25 1211       Plan    Plan Anticipate return home with family.    Plan Comments DC Barriers: Scheduled to have HIDA scan performed, surgery following, IVF per nephrology, IV abx, pending blood cultures.                  Yola Gaona RN     Office Phone: [phone]  Office Cell: [phone]

## 2025-03-26 NOTE — THERAPY TREATMENT NOTE
Acute Care - Speech Language Pathology   Swallow Treatment Note  Abhijeet     Patient Name: Serina Powell  : 1970  MRN: [id number]  Today's Date: 3/26/2025               Admit Date: 3/24/2025    Visit Dx:     ICD-10-CM ICD-9-CM   1. Altered mental status, unspecified altered mental status type  R41.82 780.97   2. Hyperglycemia  R73.9 790.29   3. Fever, unspecified fever cause  R50.9 780.60   4. Sepsis, due to unspecified organism, unspecified whether acute organ dysfunction present  A41.9 038.9     995.91   5. RSV infection  B33.8 079.6   6. WILLIAM (acute kidney injury)  N17.9 584.9   7. Hypokalemia  E87.6 276.8   8. Calculus of gallbladder without cholecystitis without obstruction  K80.20 574.20   9. 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    Diabetic peripheral neuropathy associated with type 2 diabetes mellitus    Acute osteomyelitis of left foot    Obesity    Healthcare maintenance    Polycythemia    Seizures    Tobacco dependence syndrome    Verruca vulgaris    Other dorsalgia    Mental health disorder    Bipolar 1 disorder    Personal history of tobacco use, presenting hazards to health    Ear pain, bilateral    Altered mental status     Past Medical History:   Diagnosis Date    Anaclitic depression 2013    COPD (chronic obstructive pulmonary disease)     Depression     Diabetes mellitus     Hyperlipidemia     Hypertension     Injury of back     Obesity     Seizures     Sleep apnea     Syncope without other cardiovascular symptoms      Past Surgical History:   Procedure Laterality Date    ADENOIDECTOMY      FOOT WOUND CLOSURE Left      INCISION AND DRAINAGE OF WOUND Left 06/21/2023    Procedure: incison and draiange, left Second and third partial ray resection, applciation of wound vac;  Surgeon: KASIE Donahue DPM;  Location: [facility] MAIN OR;  Service: Podiatry;  Laterality: Left;    LAPAROSCOPIC TUBAL LIGATION      ORIF HUMERUS FRACTURE Left 02/10/2021    Procedure: HUMERUS PROXIMAL OPEN REDUCTION INTERNAL FIXATION;  Surgeon: Julián Tinoco MD;  Location: [facility] MAIN OR;  Service: Orthopedics;  Laterality: Left;    TONSILLECTOMY         SLP Recommendation and Plan         Pt was seen for skilled ST targeting meal assessment. Pt alert and upright in bed with sitter present. Nursing reports removing soft restraints yesterday afternoon and states pt is quite improved from previous date; pt endorsed this. Meal tray present, which consisted of pureed eggs, cream of wheat, and fruit with thin liquid drinks by cup and straw. Pt assessed with intake of ~x5 drinks by straw; x2 thin by spoon; x1 thin by straw; x7 bites puree. Pt demonstrated functional, yet prolonged oral manipulation of pureed solids; adequate labial seal; no overt s/sx of aspiration presented this date; minimal residue in oral cavity after swallow. Pt noted to attempt to use straw with some textures and use the incorrect end of the spoon, demonstrating continued general confusion. Given mentation and weakness, pt not appropriate for diet upgrade at this time.     Recommend:   - Continue pureed solids/ thin liquids.   - Exercise general safe swallow precautions (slow rate, small bites/sips, alternate solids/liquids, upright posture during/after meals).   - ST will continue to follow to ensure safety and tolerance to diet recs and make additional recs as indicated.                                                                                SWALLOW EVALUATION (Last 72 Hours)       SLP Adult Swallow Evaluation       Row Name 03/25/25 1500                   Rehab Evaluation     Document Type evaluation  -PF        Subjective Information no complaints  -PF        Patient Observations alert;cooperative;lethargic  -PF        Patient Effort good  -PF        Symptoms Noted During/After Treatment none  -PF           General Information    Patient Profile Reviewed yes  -PF        Pertinent History Of Current Problem Per H&P:  -PF        Current Method of Nutrition NPO  -PF        Precautions/Limitations, Vision WFL;for purposes of eval  -PF        Precautions/Limitations, Hearing WFL;for purposes of eval  -PF        Prior Level of Function-Communication WFL  -PF        Prior Level of Function-Swallowing unknown  -PF        Plans/Goals Discussed with patient  -PF           Oral Motor Structure and Function    Dentition Assessment edentulous;edentulous, does not have dentures  -PF        Secretion Management WNL/WFL  -PF        Mucosal Quality dry  -PF           General Eating/Swallowing Observations    Respiratory Support Currently in Use room air  -PF        Eating/Swallowing Skills fed by SLP  d/t soft restraints in place  -PF        Positioning During Eating upright 90 degree;upright in bed  -PF        Utensils Used spoon;cup;straw  -PF        Consistencies Trialed ice chips;thin liquids;pureed  -PF           Clinical Swallow Eval    Clinical Swallow Evaluation Summary Clinical swallow evaluation completed.  -PF           SLP Evaluation Clinical Impression    Functional Impact no impact on function  -PF        Rehab Potential/Prognosis, Swallowing good, to achieve stated therapy goals  -PF        Swallow Criteria for Skilled Therapeutic Interventions Met demonstrates skilled criteria  -PF           Recommendations    Therapy Frequency (Swallow) 3 days per week;4 days per week  -PF        Predicted Duration Therapy Intervention (Days) until discharge  -PF        SLP Diet Recommendation puree;thin liquids  -PF        Recommended Diagnostics reassess via clinical swallow evaluation  -PF         Recommended Precautions and Strategies upright posture during/after eating;small bites of food and sips of liquid;multiple swallows per bite of food;multiple swallows per sip of liquid;alternate between small bites of food and sips of liquid  -PF        Oral Care Recommendations Oral Care BID/PRN;Oral Care before breakfast, after meals and PRN  -PF        SLP Rec. for Method of Medication Administration meds crushed;meds whole;with thin liquids;with puree;as tolerated  -PF        Monitor for Signs of Aspiration yes;notify SLP if any concerns  -PF           Swallow Goals (SLP)    Swallow LTGs Swallow Long Term Goal (free text)  -PF        Swallow STGs diet tolerance goal selection (SLP)  -PF        Diet Tolerance Goal Selection (SLP) Swallow Short Term Goal 1;Swallow Short Term Goal 2  -PF           (LTG) Swallow    (LTG) Swallow Pt will maximize swallow function for least restrictive PO diet, exhibiting no complication associated with dysphagia, adequate PO intake, and demonstrating independent use of swallow compensation.  -PF        [place] (Swallow Long Term Goal) with minimal cues (75-90% accuracy)  -PF        Time Frame (Swallow Long Term Goal) by discharge  -PF        Progress/Outcomes (Swallow Long Term Goal) new goal  -PF           (STG) Swallow 1    (STG) Swallow 1 Patient will participate in ongoing assessment of swallow, including reevaluation clinically and/or including instrumental assessment of swallow if indicated, to further assess swallow function .  -PF        [place] (Swallow Short Term Goal 1) with minimal cues (75-90% accuracy)  -PF        Time Frame (Swallow Short Term Goal 1) 1 week  -PF        Progress/Outcomes (Swallow Short Term Goal 1) new goal  -PF           (STG) Swallow 2    (STG) Swallow 2 The patient will participate in a full meal assessment to determine safety and adequacy of recommended diet, independent use of safe swallow compensations, and additional  goals/recommendations to follow.  -PF        [place] (Swallow Short Term Goal 2) with minimal cues (75-90% accuracy)  -PF        Time Frame (Swallow Short Term Goal 2) 1 week  -PF        Progress/Outcomes (Swallow Short Term Goal 2) new goal  -PF                  User Key  (r) = Recorded By, (t) = Taken By, (c) = Cosigned By      Initials Name Effective Dates    PF Veronica Loya, SLP 05/08/23 -                     EDUCATION  The patient has been educated in the following areas:   Dysphagia (Swallowing Impairment) Modified Diet Instruction.        SLP GOALS       Row Name 03/26/25 0800 03/25/25 1500          (LTG) Swallow    (LTG) Swallow Pt will maximize swallow function for least restrictive PO diet, exhibiting no complication associated with dysphagia, adequate PO intake, and demonstrating independent use of swallow compensation.  -MB Pt will maximize swallow function for least restrictive PO diet, exhibiting no complication associated with dysphagia, adequate PO intake, and demonstrating independent use of swallow compensation.  -PF     [place] (Swallow Long Term Goal) with minimal cues (75-90% accuracy)  -MB with minimal cues (75-90% accuracy)  -PF     Time Frame (Swallow Long Term Goal) by discharge  -MB by discharge  -PF     Progress/Outcomes (Swallow Long Term Goal) goal ongoing  -MB new goal  -PF        (STG) Swallow 1    (STG) Swallow 1 Patient will participate in ongoing assessment of swallow, including reevaluation clinically and/or including instrumental assessment of swallow if indicated, to further assess swallow function .  -MB Patient will participate in ongoing assessment of swallow, including reevaluation clinically and/or including instrumental assessment of swallow if indicated, to further assess swallow function .  -PF     [place] (Swallow Short Term Goal 1) with minimal cues (75-90% accuracy)  -MB with minimal cues (75-90% accuracy)  -PF     Time Frame (Swallow Short Term Goal  1) 1 week  -MB 1 week  -PF     Progress/Outcomes (Swallow Short Term Goal 1) goal ongoing  -MB new goal  -PF        (STG) Swallow 2    (STG) Swallow 2 The patient will participate in a full meal assessment to determine safety and adequacy of recommended diet, independent use of safe swallow compensations, and additional goals/recommendations to follow.  -MB The patient will participate in a full meal assessment to determine safety and adequacy of recommended diet, independent use of safe swallow compensations, and additional goals/recommendations to follow.  -PF     [place] (Swallow Short Term Goal 2) with minimal cues (75-90% accuracy)  -MB with minimal cues (75-90% accuracy)  -PF     Time Frame (Swallow Short Term Goal 2) 1 week  -MB 1 week  -PF     Progress/Outcomes (Swallow Short Term Goal 2) goal ongoing  -MB new goal  -PF               User Key  (r) = Recorded By, (t) = Taken By, (c) = Cosigned By      Initials Name Provider Type    PF Veronica Loya, SLP Speech and Language Pathologist    Burt Panda, SLP Speech and Language Pathologist                         Time Calculation:                HAO Valera  3/26/2025

## 2025-03-26 NOTE — PLAN OF CARE
Problem: Adult Inpatient Plan of Care  Goal: Plan of Care Review  Outcome: Progressing  Goal: Patient-Specific Goal (Individualized)  Outcome: Progressing  Goal: Absence of Hospital-Acquired Illness or Injury  Outcome: Progressing  Intervention: Identify and Manage Fall Risk  Recent Flowsheet Documentation  Taken 3/26/2025 1400 by Marquita Claros RN  Safety Promotion/Fall Prevention:   activity supervised   assistive device/personal items within reach   clutter free environment maintained   safety round/check completed   room organization consistent   nonskid shoes/slippers when out of bed  Taken 3/26/2025 1200 by Marquita Claros RN  Safety Promotion/Fall Prevention: patient off unit  Taken 3/26/2025 1000 by Marquita Claros RN  Safety Promotion/Fall Prevention:   activity supervised   clutter free environment maintained   assistive device/personal items within reach   safety round/check completed   room organization consistent   nonskid shoes/slippers when out of bed  Taken 3/26/2025 0800 by Marquita Claros RN  Safety Promotion/Fall Prevention:   activity supervised   assistive device/personal items within reach   clutter free environment maintained   safety round/check completed   room organization consistent   nonskid shoes/slippers when out of bed  Intervention: Prevent Skin Injury  Recent Flowsheet Documentation  Taken 3/26/2025 1000 by Marquita Claros RN  Body Position: position changed independently  Taken 3/26/2025 0800 by Marquita Claros RN  Body Position: position changed independently  Skin Protection: incontinence pads utilized  Intervention: Prevent Infection  Recent Flowsheet Documentation  Taken 3/26/2025 0800 by Marquita Claros RN  Infection Prevention:   rest/sleep promoted   hand hygiene promoted  Goal: Optimal Comfort and Wellbeing  Outcome: Progressing  Goal: Readiness for Transition of Care  Outcome: Progressing     Problem: Violence Risk or Actual  Goal: Anger and Impulse Control  Outcome:  Progressing  Intervention: Minimize Safety Risk  Recent Flowsheet Documentation  Taken 3/26/2025 1400 by Marquita Claros RN  Enhanced Safety Measures:   bed alarm set   room near unit station  Taken 3/26/2025 1000 by Marquita Claros RN  Enhanced Safety Measures:   bed alarm set   room near unit station  Taken 3/26/2025 0800 by Marquita Claros RN  Enhanced Safety Measures:   bed alarm set   room near unit station     Problem: Fall Injury Risk  Goal: Absence of Fall and Fall-Related Injury  Outcome: Progressing  Intervention: Promote Injury-Free Environment  Recent Flowsheet Documentation  Taken 3/26/2025 1400 by Marquita Claros RN  Safety Promotion/Fall Prevention:   activity supervised   assistive device/personal items within reach   clutter free environment maintained   safety round/check completed   room organization consistent   nonskid shoes/slippers when out of bed  Taken 3/26/2025 1200 by Marquita Claros RN  Safety Promotion/Fall Prevention: patient off unit  Taken 3/26/2025 1000 by Marquita Claros RN  Safety Promotion/Fall Prevention:   activity supervised   clutter free environment maintained   assistive device/personal items within reach   safety round/check completed   room organization consistent   nonskid shoes/slippers when out of bed  Taken 3/26/2025 0800 by Marquita Claros RN  Safety Promotion/Fall Prevention:   activity supervised   assistive device/personal items within reach   clutter free environment maintained   safety round/check completed   room organization consistent   nonskid shoes/slippers when out of bed     Problem: Skin Injury Risk Increased  Goal: Skin Health and Integrity  Outcome: Progressing  Intervention: Optimize Skin Protection  Recent Flowsheet Documentation  Taken 3/26/2025 0800 by Marquita Claros RN  Activity Management: bedrest  Pressure Reduction Techniques:   frequent weight shift encouraged   weight shift assistance provided  Head of Bed (HOB) Positioning: HOB elevated  Pressure  Reduction Devices:   specialty bed utilized   pressure-redistributing mattress utilized   positioning supports utilized   heel offloading device utilized  Skin Protection: incontinence pads utilized     Problem: Sepsis/Septic Shock  Goal: Optimal Coping  Outcome: Progressing  Goal: Absence of Bleeding  Outcome: Progressing  Goal: Blood Glucose Level Within Target Range  Outcome: Progressing  Goal: Absence of Infection Signs and Symptoms  Outcome: Progressing  Intervention: Initiate Sepsis Management  Recent Flowsheet Documentation  Taken 3/26/2025 0800 by Marquita Claros RN  Infection Prevention:   rest/sleep promoted   hand hygiene promoted  Isolation Precautions:   precautions maintained   droplet  Intervention: Promote Recovery  Recent Flowsheet Documentation  Taken 3/26/2025 0800 by Marquita Claros RN  Activity Management: bedrest  Goal: Optimal Nutrition Delivery  Outcome: Progressing   Goal Outcome Evaluation:               Patient improving. She is able to follow commands. A&Ox2. Sitter bedside. Vitals stable on RA. IVF at 100/hr. PT/OT consulted. HIDA scan this am negative for cholecystitis. Denies pain. She is eating and able to feed herself with minimal help; pureed diet. Potassium and phos replaced this am. Cellulitis of LLE, negative for DVT. Blood cultures negative. Droplet for RSV, no coughing or difficulty breathing. Continuing Rocephin and Flagyl. WBC trending down. Urinating and bowel sounds present. Daughter updated. Call light in reach.

## 2025-03-27 LAB
ALBUMIN SERPL-MCNC: 2.4 G/DL (ref 3.5–5.2)
ANION GAP SERPL CALCULATED.3IONS-SCNC: 9.8 MMOL/L (ref 5–15)
BASOPHILS # BLD AUTO: 0.06 10*3/MM3 (ref 0–0.2)
BASOPHILS NFR BLD AUTO: 0.5 % (ref 0–1.5)
BUN SERPL-MCNC: 19 MG/DL (ref 6–20)
BUN/CREAT SERPL: 19.4 (ref 7–25)
CALCIUM SPEC-SCNC: 7.7 MG/DL (ref 8.6–10.5)
CHLORIDE SERPL-SCNC: 103 MMOL/L (ref 98–107)
CO2 SERPL-SCNC: 21.2 MMOL/L (ref 22–29)
CREAT SERPL-MCNC: 0.98 MG/DL (ref 0.57–1)
DEPRECATED RDW RBC AUTO: 42.5 FL (ref 37–54)
EGFRCR SERPLBLD CKD-EPI 2021: 67.9 ML/MIN/1.73
EOSINOPHIL # BLD AUTO: 0.06 10*3/MM3 (ref 0–0.4)
EOSINOPHIL NFR BLD AUTO: 0.5 % (ref 0.3–6.2)
ERYTHROCYTE [DISTWIDTH] IN BLOOD BY AUTOMATED COUNT: 13.1 % (ref 12.3–15.4)
GLUCOSE BLDC GLUCOMTR-MCNC: 140 MG/DL (ref 70–105)
GLUCOSE BLDC GLUCOMTR-MCNC: 156 MG/DL (ref 70–105)
GLUCOSE BLDC GLUCOMTR-MCNC: 161 MG/DL (ref 70–105)
GLUCOSE BLDC GLUCOMTR-MCNC: 173 MG/DL (ref 70–105)
GLUCOSE SERPL-MCNC: 189 MG/DL (ref 65–99)
HCT VFR BLD AUTO: 30.5 % (ref 34–46.6)
HGB BLD-MCNC: 9.8 G/DL (ref 12–15.9)
IMM GRANULOCYTES # BLD AUTO: 0.15 10*3/MM3 (ref 0–0.05)
IMM GRANULOCYTES NFR BLD AUTO: 1.3 % (ref 0–0.5)
LYMPHOCYTES # BLD AUTO: 2.3 10*3/MM3 (ref 0.7–3.1)
LYMPHOCYTES NFR BLD AUTO: 20.5 % (ref 19.6–45.3)
MAGNESIUM SERPL-MCNC: 2.1 MG/DL (ref 1.6–2.6)
MCH RBC QN AUTO: 28.4 PG (ref 26.6–33)
MCHC RBC AUTO-ENTMCNC: 32.1 G/DL (ref 31.5–35.7)
MCV RBC AUTO: 88.4 FL (ref 79–97)
MONOCYTES # BLD AUTO: 0.74 10*3/MM3 (ref 0.1–0.9)
MONOCYTES NFR BLD AUTO: 6.6 % (ref 5–12)
NEUTROPHILS NFR BLD AUTO: 7.92 10*3/MM3 (ref 1.7–7)
NEUTROPHILS NFR BLD AUTO: 70.6 % (ref 42.7–76)
NRBC BLD AUTO-RTO: 0 /100 WBC (ref 0–0.2)
PHOSPHATE SERPL-MCNC: 2.5 MG/DL (ref 2.5–4.5)
PLATELET # BLD AUTO: 137 10*3/MM3 (ref 140–450)
PMV BLD AUTO: 11.5 FL (ref 6–12)
POTASSIUM SERPL-SCNC: 3.6 MMOL/L (ref 3.5–5.2)
RBC # BLD AUTO: 3.45 10*6/MM3 (ref 3.77–5.28)
SODIUM SERPL-SCNC: 134 MMOL/L (ref 136–145)
WBC NRBC COR # BLD AUTO: 11.23 10*3/MM3 (ref 3.4–10.8)

## 2025-03-27 PROCEDURE — 94799 UNLISTED PULMONARY SVC/PX: CPT

## 2025-03-27 PROCEDURE — 25010000002 METRONIDAZOLE 500 MG/100ML SOLUTION: Performed by: INTERNAL MEDICINE

## 2025-03-27 PROCEDURE — 97162 PT EVAL MOD COMPLEX 30 MIN: CPT

## 2025-03-27 PROCEDURE — 82948 REAGENT STRIP/BLOOD GLUCOSE: CPT

## 2025-03-27 PROCEDURE — 97165 OT EVAL LOW COMPLEX 30 MIN: CPT

## 2025-03-27 PROCEDURE — 92526 ORAL FUNCTION THERAPY: CPT

## 2025-03-27 PROCEDURE — 63710000001 INSULIN LISPRO (HUMAN) PER 5 UNITS: Performed by: STUDENT IN AN ORGANIZED HEALTH CARE EDUCATION/TRAINING PROGRAM

## 2025-03-27 PROCEDURE — 85025 COMPLETE CBC W/AUTO DIFF WBC: CPT

## 2025-03-27 PROCEDURE — 63710000001 INSULIN GLARGINE PER 5 UNITS

## 2025-03-27 PROCEDURE — 83735 ASSAY OF MAGNESIUM: CPT

## 2025-03-27 PROCEDURE — 63710000001 INSULIN LISPRO (HUMAN) PER 5 UNITS: Performed by: INTERNAL MEDICINE

## 2025-03-27 PROCEDURE — 63710000001 ONDANSETRON ODT 4 MG TABLET DISPERSIBLE

## 2025-03-27 PROCEDURE — 25010000002 CEFTRIAXONE PER 250 MG: Performed by: INTERNAL MEDICINE

## 2025-03-27 PROCEDURE — 80069 RENAL FUNCTION PANEL: CPT | Performed by: INTERNAL MEDICINE

## 2025-03-27 PROCEDURE — 82948 REAGENT STRIP/BLOOD GLUCOSE: CPT | Performed by: STUDENT IN AN ORGANIZED HEALTH CARE EDUCATION/TRAINING PROGRAM

## 2025-03-27 RX ORDER — NICOTINE 21 MG/24HR
1 PATCH, TRANSDERMAL 24 HOURS TRANSDERMAL
Status: DISCONTINUED | OUTPATIENT
Start: 2025-03-27 | End: 2025-03-28 | Stop reason: HOSPADM

## 2025-03-27 RX ORDER — INSULIN LISPRO 100 [IU]/ML
2-9 INJECTION, SOLUTION INTRAVENOUS; SUBCUTANEOUS
Status: DISCONTINUED | OUTPATIENT
Start: 2025-03-27 | End: 2025-03-28 | Stop reason: HOSPADM

## 2025-03-27 RX ORDER — IBUPROFEN 400 MG/1
800 TABLET, FILM COATED ORAL EVERY 8 HOURS PRN
Status: DISCONTINUED | OUTPATIENT
Start: 2025-03-27 | End: 2025-03-28 | Stop reason: HOSPADM

## 2025-03-27 RX ADMIN — INSULIN LISPRO 11 UNITS: 100 INJECTION, SOLUTION INTRAVENOUS; SUBCUTANEOUS at 08:25

## 2025-03-27 RX ADMIN — NICOTINE 1 PATCH: 21 PATCH TRANSDERMAL at 18:32

## 2025-03-27 RX ADMIN — QUETIAPINE FUMARATE 25 MG: 25 TABLET ORAL at 21:27

## 2025-03-27 RX ADMIN — INSULIN LISPRO 2 UNITS: 100 INJECTION, SOLUTION INTRAVENOUS; SUBCUTANEOUS at 17:53

## 2025-03-27 RX ADMIN — IPRATROPIUM BROMIDE AND ALBUTEROL SULFATE 3 ML: .5; 3 SOLUTION RESPIRATORY (INHALATION) at 20:20

## 2025-03-27 RX ADMIN — PANTOPRAZOLE SODIUM 40 MG: 40 TABLET, DELAYED RELEASE ORAL at 08:25

## 2025-03-27 RX ADMIN — QUETIAPINE FUMARATE 200 MG: 100 TABLET ORAL at 21:27

## 2025-03-27 RX ADMIN — METRONIDAZOLE 500 MG: 500 INJECTION, SOLUTION INTRAVENOUS at 05:16

## 2025-03-27 RX ADMIN — Medication 5 MG: at 21:27

## 2025-03-27 RX ADMIN — ONDANSETRON 4 MG: 4 TABLET, ORALLY DISINTEGRATING ORAL at 21:27

## 2025-03-27 RX ADMIN — INSULIN LISPRO 2 UNITS: 100 INJECTION, SOLUTION INTRAVENOUS; SUBCUTANEOUS at 21:27

## 2025-03-27 RX ADMIN — BUDESONIDE AND FORMOTEROL FUMARATE DIHYDRATE 2 PUFF: 160; 4.5 AEROSOL RESPIRATORY (INHALATION) at 20:20

## 2025-03-27 RX ADMIN — CEFTRIAXONE 2000 MG: 2 INJECTION, POWDER, FOR SOLUTION INTRAMUSCULAR; INTRAVENOUS at 13:24

## 2025-03-27 RX ADMIN — INSULIN LISPRO 11 UNITS: 100 INJECTION, SOLUTION INTRAVENOUS; SUBCUTANEOUS at 13:24

## 2025-03-27 RX ADMIN — INSULIN LISPRO 2 UNITS: 100 INJECTION, SOLUTION INTRAVENOUS; SUBCUTANEOUS at 13:25

## 2025-03-27 RX ADMIN — INSULIN GLARGINE 20 UNITS: 100 INJECTION, SOLUTION SUBCUTANEOUS at 21:27

## 2025-03-27 RX ADMIN — INSULIN LISPRO 11 UNITS: 100 INJECTION, SOLUTION INTRAVENOUS; SUBCUTANEOUS at 17:52

## 2025-03-27 RX ADMIN — IBUPROFEN 800 MG: 400 TABLET, FILM COATED ORAL at 17:52

## 2025-03-27 RX ADMIN — PANTOPRAZOLE SODIUM 40 MG: 40 TABLET, DELAYED RELEASE ORAL at 17:52

## 2025-03-27 NOTE — PLAN OF CARE
Goal Outcome Evaluation:  Plan of Care Reviewed With: patient        Progress: no change  Outcome Evaluation: Pt. is a 54 y.o. female with medical hx of COPD, HTN, GERD, DM, and anxiety/depression who presents with confusion and unwitnessed fall as well as 2 wk hx of abdominal pain. CXR and CT of head with no acute findings. She is positive for RSV as well as positive UDS.  Pt. states she lives at home w/ daughter and maintains ADL independence at baseline, two granddaughters also live at home Pt. adverse to OOB activity but agrees with encouragement, supervision for functional transfers, incresaed difficulty ambulating due to BLE swelling and increased pain, rolling walker support provided. Pt. requires increased assist for LB ADLs this date due to increased swelling. Anticpiate patient close to baseline functional independence, confusion resolving and safe for d/c home once medically stable. Pt. does not require furthur skilled OT at this time.    Anticipated Discharge Disposition (OT): home with assist

## 2025-03-27 NOTE — CASE MANAGEMENT/SOCIAL WORK
Continued Stay Note  [facility]     Patient Name: Serina Powell  MRN: [id number]  Today's Date: 3/27/2025    Admit Date: 3/24/2025    Plan: Anticipate return home with family.   Discharge Plan       Row Name 03/27/25 1549       Plan    Plan Comments Barrier to D/C: IV abx, replacing electrolytes.                      Expected Discharge Date and Time       Expected Discharge Date Expected Discharge Time    Mar 28, 2025           Phone communication or documentation only - no physical contact with patient or family.     DANIELLE BarrazaN, RN, [facility]    [place] [id number]    Office: [phone]  Fax: [phone]

## 2025-03-27 NOTE — PLAN OF CARE
Goal Outcome Evaluation:  Plan of Care Reviewed With: patient           Outcome Evaluation: Serina Powell is a 54 y.o. female with medical hx of COPD, HTN, GERD, DM, and anxiety/depression who presents with confusion and unwitnessed fall as well as 2 wk hx of abdominal pain. CXR and CT of head with no acute findings. She is positive for RSV as well as positive UDS. At baseline, pt lives in a 1st floor apt with her daughter and young grandchildren.  Pt is typically independent for mobility without AD but has a RW if needed.  Pt states that she is independent for ADLs and has a shower seat and Handheld shower hose.  She reports that she helps care for her grandchildren.  At time of PT she is A&O x3 but disoriented to reason for admission.  She denies any hx of falls.  Pt c/o pain in LLE due to cellulitis and needs encouragement to get OOB.  Pt requires CGA for supine to sit and transfers with CG-SBA. She ambulates 20' with RW and CG-SBA utlizing a flexed posture and slow gait pattern.  Pt appears close to her baseline. We will work with her while here to encourage OOB and improvied gait and posture with mobility. No expected PT needs after discharge    Anticipated Discharge Disposition (PT): home

## 2025-03-27 NOTE — PROGRESS NOTES
[facility] MEDICINE SERVICE  DAILY PROGRESS NOTE    NAME: Serina Powell  : 1970  MRN: [id number]      LOS: 3 days     PROVIDER OF SERVICE: Saarh Kinsey MD    Chief Complaint: Altered mental status    Subjective:     Interval History:  History taken from: patient    Not complaining, confused    Review of Systems:   Review of Systems   Unable to perform ROS: Mental status change   All other systems reviewed and are negative.      Objective:     Vital Signs  Temp:  [97.5 °F (36.4 °C)-98.5 °F (36.9 °C)] 97.8 °F (36.6 °C)  Heart Rate:  [65-82] 67  Resp:  [15-22] 15  BP: (112-148)/(67-97) 144/87   Body mass index is 49.16 kg/m².    Physical Exam  Physical Exam  Constitutional:       Appearance: Normal appearance.   HENT:      Head: Normocephalic and atraumatic.      Nose: Nose normal.      Mouth/Throat:      Mouth: Mucous membranes are moist.   Eyes:      Extraocular Movements: Extraocular movements intact.      Pupils: Pupils are equal, round, and reactive to light.   Cardiovascular:      Rate and Rhythm: Normal rate and regular rhythm.   Pulmonary:      Effort: Pulmonary effort is normal.      Breath sounds: Normal breath sounds.   Abdominal:      General: Abdomen is flat. Bowel sounds are normal.      Palpations: Abdomen is soft.   Musculoskeletal:      Cervical back: Normal range of motion and neck supple.      Comments: Left lower extremity swelling and erythema involving the shin and lateral aspect of the left leg   Skin:     General: Skin is warm and dry.   Neurological:      Mental Status: She is alert.      Comments: Alert, following directions, confused         Current Medications:  Scheduled Meds:budesonide-formoterol, 2 puff, Inhalation, BID - RT  cefTRIAXone, 2,000 mg, Intravenous, Q24H  insulin glargine, 20 Units, Subcutaneous, Nightly  insulin lispro, 11 Units, Subcutaneous, TID With Meals  insulin lispro, 2-9 Units, Subcutaneous, 4x Daily With Meals &  Nightly  ipratropium-albuterol, 3 mL, Nebulization, 4x Daily - RT  [Held by provider] nebivolol, 10 mg, Oral, Daily  pantoprazole, 40 mg, Oral, BID AC  QUEtiapine, 25 mg, Oral, Nightly   And  QUEtiapine, 200 mg, Oral, Nightly      Continuous Infusions:     PRN Meds:.  albuterol    senna-docusate sodium **AND** polyethylene glycol **AND** bisacodyl **AND** bisacodyl    Calcium Replacement - Follow Nurse / BPA Driven Protocol    dextrose    dextrose    glucagon (human recombinant)    ibuprofen    influenza vaccine    Magnesium Standard Dose Replacement - Follow Nurse / BPA Driven Protocol    Magnesium Standard Dose Replacement - Follow Nurse / BPA Driven Protocol    melatonin    ondansetron ODT **OR** ondansetron    Phosphorus Replacement - Follow Nurse / BPA Driven Protocol    Potassium Replacement - Follow Nurse / BPA Driven Protocol    sodium chloride    [COMPLETED] Insert Peripheral IV **AND** sodium chloride       Diagnostic Data    Results from last 7 days   Lab Units 03/27/25  0344 03/25/25  0551 03/24/25  1134   WBC 10*3/mm3 11.23*   < > 16.05*   HEMOGLOBIN g/dL 9.8*   < > 13.6   HEMATOCRIT % 30.5*   < > 40.0   PLATELETS 10*3/mm3 137*   < > 187   GLUCOSE mg/dL 189*   < > 479*   CREATININE mg/dL 0.98   < > 1.73*   BUN mg/dL 19   < > 22*   SODIUM mmol/L 134*   < > 137   POTASSIUM mmol/L 3.6   < > 3.0*   AST (SGOT) U/L  --   --  65*   ALT (SGPT) U/L  --   --  18   ALK PHOS U/L  --   --  75   BILIRUBIN mg/dL  --   --  0.6   ANION GAP mmol/L 9.8   < > 18.1*    < > = values in this interval not displayed.       NM HIDA SCAN WITHOUT PHARMACOLOGICAL INTERVENTION  Result Date: 3/26/2025  Impression: Negative for acute calculus cholecystitis. Electronically Signed: Elpidio Chavis MD  3/26/2025 1:32 PM EDT  Workstation ID: [id number]    US Abdomen Limited  Result Date: 3/25/2025  Impression: 1.Cholelithiasis with a positive sonographic Lala sign, but no abnormal gallbladder wall thickening or pericholecystic fluid.  Findings are equivocal for acute cholecystitis. 2.Hepatomegaly with hepatic steatosis. Electronically Signed: Robles Sahaelor  3/25/2025 6:10 PM EDT  Workstation ID: [id number]        I reviewed the patient's new clinical results.    Assessment/Plan:     Active and Resolved Problems  Active Hospital Problems    Diagnosis  POA    **Altered mental status [R41.82]  Yes      Resolved Hospital Problems   No resolved problems to display.       Left lower extremity cellulitis  Sepsis  AMS with agitation  Cholelithiasis with gallbladder wall distention  Esophagitis  RSV infection  WILLIAM  Metabolic acidosis  Hypokalemia  Hypomagnesemia  Diabetes mellitus type 2  Hypertension        - No significant fever today.  Blood cultures negative so far.  MRSA PCR negative.  Continue IV ceftriaxone.  Continue to follow blood cultures.  Procalcitonin was elevated at 3.29 but has trended down to 2.75.  Monitor.  No DVT on Doppler ultrasound.    -Cholelithiasis with gallbladder wall distention:-HIDA scan negative for cholecystitis.  Discontinue Flagyl.    - Continue Protonix for esophagitis demonstrated on CAT scan    - Supportive care for RSV infection.  Start droplet isolation.    - Renal function is improving.  Nephrology following.  Continue to monitor.    - Metabolic acidosis is improving.  Continue to monitor acid-base status.    - Replace potassium for hypokalemia    -Hypomagnesemia is improved    - Continue current insulin regimen, monitor the blood glucose trend with regard to diabetes mellitus.  Hemoglobin A1c is 8.06% done last month February 2025    -  Continue current blood pressure medication regimen.    - Continue current management.    VTE Prophylaxis:  Mechanical VTE prophylaxis orders are present.             Disposition Planning:     Barriers to Discharge: Pending clinical improvement  Anticipated Date of Discharge: 3/31/2025  Place of Discharge: Home versus SNF          Code Status and Medical Interventions: CPR (Attempt  to Resuscitate); Full Support   Ordered at: 03/24/25 1459     Code Status (Patient has no pulse and is not breathing):    CPR (Attempt to Resuscitate)     Medical Interventions (Patient has pulse or is breathing):    Full Support       Signature: Electronically signed by Sarah Kinsey MD, 03/27/25, 14:06 EDT.  [facility] Hospitalist Team

## 2025-03-27 NOTE — THERAPY EVALUATION
Patient Name: Serina Powell  : 1970    MRN: [id number]                              Today's Date: 3/27/2025       Admit Date: 3/24/2025    Visit Dx:     ICD-10-CM ICD-9-CM   1. Altered mental status, unspecified altered mental status type  R41.82 780.97   2. Hyperglycemia  R73.9 790.29   3. Fever, unspecified fever cause  R50.9 780.60   4. Sepsis, due to unspecified organism, unspecified whether acute organ dysfunction present  A41.9 038.9     995.91   5. RSV infection  B33.8 079.6   6. WILLIAM (acute kidney injury)  N17.9 584.9   7. Hypokalemia  E87.6 276.8   8. Calculus of gallbladder without cholecystitis without obstruction  K80.20 574.20   9. 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    Diabetic peripheral neuropathy associated with type 2 diabetes mellitus    Acute osteomyelitis of left foot    Obesity    Healthcare maintenance    Polycythemia    Seizures    Tobacco dependence syndrome    Verruca vulgaris    Other dorsalgia    Mental health disorder    Bipolar 1 disorder    Personal history of tobacco use, presenting hazards to health    Ear pain, bilateral    Altered mental status     Past Medical History:   Diagnosis Date    Anaclitic depression 2013    COPD (chronic obstructive pulmonary disease)     Depression     Diabetes mellitus     Hyperlipidemia     Hypertension     Injury of back     Obesity     Seizures     Sleep apnea     Syncope without other cardiovascular symptoms      Past Surgical History:   Procedure Laterality Date    ADENOIDECTOMY      FOOT WOUND CLOSURE Left     INCISION AND DRAINAGE OF WOUND Left 2023     Procedure: incison and draiange, left Second and third partial ray resection, applciation of wound vac;  Surgeon: KASIE Donahue DPM;  Location: [facility] MAIN OR;  Service: Podiatry;  Laterality: Left;    LAPAROSCOPIC TUBAL LIGATION      ORIF HUMERUS FRACTURE Left 02/10/2021    Procedure: HUMERUS PROXIMAL OPEN REDUCTION INTERNAL FIXATION;  Surgeon: Julián Tinoco MD;  Location: [facility] MAIN OR;  Service: Orthopedics;  Laterality: Left;    TONSILLECTOMY        General Information       Row Name 03/27/25 1212          Physical Therapy Time and Intention    Document Type evaluation  -CL     Mode of Treatment physical therapy  -CL       Row Name 03/27/25 1212          General Information    Patient Profile Reviewed yes  -CL     Prior Level of Function independent:;ADL's;gait;transfer  -CL     Barriers to Rehab none identified  -CL       Row Name 03/27/25 1212          Living Environment    Current Living Arrangements apartment  -CL     People in Home child(satnam), adult;grandchild(satnam)  -CL     Name(s) of People in Home Keiry Mendez  -CL       Row Name 03/27/25 1212          Home Main Entrance    Number of Stairs, Main Entrance none  -CL       Row Name 03/27/25 1212          Stairs Within Home, Primary    Number of Stairs, Within Home, Primary none  -CL       Row Name 03/27/25 1212          Cognition    Orientation Status (Cognition) oriented x 3  -CL       Row Name 03/27/25 1212          Safety Issues/Impairments Affecting Functional Mobility    Safety Issues Affecting Function (Mobility) insight into deficits/self-awareness;judgment  -CL     Impairments Affecting Function (Mobility) endurance/activity tolerance  -CL               User Key  (r) = Recorded By, (t) = Taken By, (c) = Cosigned By      Initials Name Provider Type    CL Debbie Solis, PT Physical Therapist                   Mobility       Row Name 03/27/25 1215          Bed Mobility    Bed Mobility supine-sit  -CL     Supine-Sit [place] (Bed  Mobility) contact guard  -CL       Row Name 03/27/25 1215          Bed-Chair Transfer    Bed-Chair [place] (Transfers) contact guard  -CL     Assistive Device (Bed-Chair Transfers) walker, front-wheeled  -CL       Row Name 03/27/25 1215          Sit-Stand Transfer    Sit-Stand [place] (Transfers) contact guard  -CL     Assistive Device (Sit-Stand Transfers) walker, front-wheeled  -CL       Row Name 03/27/25 1215          Gait/Stairs (Locomotion)    [place] Level (Gait) contact guard  -CL     Assistive Device (Gait) walker, front-wheeled  -CL     Patient was able to Ambulate yes  -CL     Distance in Feet (Gait) 20  -CL     Deviations/Abnormal Patterns (Gait) base of support, wide  -CL     Bilateral Gait Deviations forward flexed posture;heel strike decreased  -CL               User Key  (r) = Recorded By, (t) = Taken By, (c) = Cosigned By      Initials Name Provider Type    Debbie Acosta, PT Physical Therapist                   Obj/Interventions       Row Name 03/27/25 1216          Range of Motion Comprehensive    General Range of Motion bilateral lower extremity ROM WFL  -CL       Row Name 03/27/25 1216          Strength Comprehensive (MMT)    Comment, General Manual Muscle Testing (MMT) Assessment Demonstrates sufficient BLE strength to wt bear in standing  -CL       Row Name 03/27/25 1216          Balance    Balance Assessment sitting static balance;sitting dynamic balance;standing static balance;standing dynamic balance  -CL     Static Sitting Balance supervision  -CL     Dynamic Sitting Balance supervision  -CL     Position, Sitting Balance unsupported;sitting edge of bed  -CL     Static Standing Balance supervision  -CL     Dynamic Standing Balance contact guard  -CL     Position/Device Used, Standing Balance supported;walker, rolling  -CL       Row Name 03/27/25 1216          Sensory Assessment (Somatosensory)    Bilateral LE Sensory Assessment light touch awareness;impaired  -CL                User Key  (r) = Recorded By, (t) = Taken By, (c) = Cosigned By      Initials Name Provider Type    Debbie Acosta, PT Physical Therapist                   Goals/Plan       Row Name 03/27/25 1224          Bed Mobility Goal 1 (PT)    Activity/Assistive Device (Bed Mobility Goal 1, PT) bed mobility activities, all  -CL     [place] Level/Cues Needed (Bed Mobility Goal 1, PT) independent  -CL     Time Frame (Bed Mobility Goal 1, PT) long term goal (LTG);2 weeks  -CL       Row Name 03/27/25 1224          Transfer Goal 1 (PT)    Activity/Assistive Device (Transfer Goal 1, PT) sit-to-stand/stand-to-sit;bed-to-chair/chair-to-bed  -CL     [place] Level/Cues Needed (Transfer Goal 1, PT) modified independence  -CL     Time Frame (Transfer Goal 1, PT) long term goal (LTG);2 weeks  -CL       Row Name 03/27/25 1224          Gait Training Goal 1 (PT)    Activity/Assistive Device (Gait Training Goal 1, PT) gait (walking locomotion);assistive device use;diminish gait deviation;improve balance and speed;increase endurance/gait distance  -CL     [place] Level (Gait Training Goal 1, PT) independent;modified independence  -CL     Distance (Gait Training Goal 1, PT) 100'  -CL     Time Frame (Gait Training Goal 1, PT) long term goal (LTG);2 weeks  -CL       Row Name 03/27/25 1224          Therapy Assessment/Plan (PT)    Planned Therapy Interventions (PT) balance training;bed mobility training;gait training;patient/family education;postural re-education;strengthening;transfer training  -CL               User Key  (r) = Recorded By, (t) = Taken By, (c) = Cosigned By      Initials Name Provider Type    Debbie Acosta, PT Physical Therapist                   Clinical Impression       Row Name 03/27/25 1218          Pain    Pretreatment Pain Rating 4/10  -CL     Posttreatment Pain Rating 4/10  -CL     Pain Location calf  -CL     Pain Side/Orientation left  -CL     Pain Management Interventions positioning  techniques utilized  -CL     Pre/Posttreatment Pain Comment Pt c/o pain from cellulitis LLE  -CL       Row Name 03/27/25 1218          Plan of Care Review    Plan of Care Reviewed With patient  -CL     Outcome Evaluation Serina Powell is a 54 y.o. female with medical hx of COPD, HTN, GERD, DM, and anxiety/depression who presents with confusion and unwitnessed fall as well as 2 wk hx of abdominal pain. CXR and CT of head with no acute findings. She is positive for RSV as well as positive UDS. At baseline, pt lives in a 1st floor apt with her daughter and young grandchildren.  Pt is typically independent for mobility without AD but has a RW if needed.  Pt states that she is independent for ADLs and has a shower seat and Handheld shower hose.  She reports that she helps care for her grandchildren.  At time of PT she is A&O x3 but disoriented to reason for admission.  She denies any hx of falls.  Pt c/o pain in LLE due to cellulitis and needs encouragement to get OOB.  Pt requires CGA for supine to sit and transfers with CG-SBA. She ambulates 20' with RW and CG-SBA utlizing a flexed posture and slow gait pattern.  Pt appears close to her baseline. We will work with her while here to encourage OOB and improvied gait and posture with mobility. No expected PT needs after discharge  -CL       Row Name 03/27/25 1218          Therapy Assessment/Plan (PT)    Rehab Potential (PT) good  -CL     Criteria for Skilled Interventions Met (PT) yes;skilled treatment is necessary  -CL     Therapy Frequency (PT) 3 times/wk  -CL     Predicted Duration of Therapy Intervention (PT) Until discharge  -CL       Row Name 03/27/25 1218          Vital Signs    Pre Systolic BP Rehab 157  -CL     Pre Treatment Diastolic BP 83  -CL     Intra Systolic BP Rehab 135  -CL     Intra Treatment Diastolic BP 81  -CL     Pretreatment Heart Rate (beats/min) 73  -CL     Intratreatment Heart Rate (beats/min) 82  -CL     Posttreatment Heart Rate (beats/min)  73  -CL     Pretreatment Resp Rate (breaths/min) 15  -CL     Intratreatment Resp Rate (breaths/min) 14  -CL     Posttreatment Resp Rate (breaths/min) 11  -CL     Pre SpO2 (%) 98  -CL     O2 Delivery Pre Treatment room air  -CL     Intra SpO2 (%) 93  -CL     O2 Delivery Intra Treatment room air  -CL     Post SpO2 (%) 97  -CL     O2 Delivery Post Treatment room air  -CL     Pre Patient Position Supine  -CL     Intra Patient Position Sitting  -CL     Post Patient Position Sitting  -CL       Row Name 03/27/25 1218          Positioning and Restraints    Pre-Treatment Position in bed  -CL     Post Treatment Position chair  -CL     In Chair notified nsg;call light within reach;reclined;encouraged to call for assist;exit alarm on  -CL               User Key  (r) = Recorded By, (t) = Taken By, (c) = Cosigned By      Initials Name Provider Type    Dbebie Acosta PT Physical Therapist                   Outcome Measures       Row Name 03/27/25 1225          How much help from another person do you currently need...    Turning from your back to your side while in flat bed without using bedrails? 4  -CL     Moving from lying on back to sitting on the side of a flat bed without bedrails? 3  -CL     Moving to and from a bed to a chair (including a wheelchair)? 3  -CL     Standing up from a chair using your arms (e.g., wheelchair, bedside chair)? 3  -CL     Climbing 3-5 steps with a railing? 2  -CL     To walk in hospital room? 3  -CL     AM-PAC 6 Clicks Score (PT) 18  -CL     Highest Level of Mobility Goal 6 --> Walk 10 steps or more  -CL       Row Name 03/27/25 1225          Functional Assessment    Outcome Measure Options AM-PAC 6 Clicks Basic Mobility (PT)  -CL               User Key  (r) = Recorded By, (t) = Taken By, (c) = Cosigned By      Initials Name Provider Type    Debbie Acosta PT Physical Therapist                                 Physical Therapy Education       Title: PT OT SLP Therapies (Done)        Topic: Physical Therapy (Done)       Point: Mobility training (Done)       Learning Progress Summary            Patient Acceptance, E,TB, VU by CL at 3/27/2025 1225                      Point: Body mechanics (Done)       Learning Progress Summary            Patient Acceptance, E,TB, VU by CL at 3/27/2025 1225                                      User Key       Initials Effective Dates Name Provider Type Discipline     03/02/22 -  Debbie Solis, PT Physical Therapist PT                  PT Recommendation and Plan  Planned Therapy Interventions (PT): balance training, bed mobility training, gait training, patient/family education, postural re-education, strengthening, transfer training  Outcome Evaluation: Serina Powell is a 54 y.o. female with medical hx of COPD, HTN, GERD, DM, and anxiety/depression who presents with confusion and unwitnessed fall as well as 2 wk hx of abdominal pain. CXR and CT of head with no acute findings. She is positive for RSV as well as positive UDS. At baseline, pt lives in a 1st floor apt with her daughter and young grandchildren.  Pt is typically independent for mobility without AD but has a RW if needed.  Pt states that she is independent for ADLs and has a shower seat and Handheld shower hose.  She reports that she helps care for her grandchildren.  At time of PT she is A&O x3 but disoriented to reason for admission.  She denies any hx of falls.  Pt c/o pain in LLE due to cellulitis and needs encouragement to get OOB.  Pt requires CGA for supine to sit and transfers with CG-SBA. She ambulates 20' with RW and CG-SBA utlizing a flexed posture and slow gait pattern.  Pt appears close to her baseline. We will work with her while here to encourage OOB and improvied gait and posture with mobility. No expected PT needs after discharge     Time Calculation:   PT Evaluation Complexity  History, PT Evaluation Complexity: 3 or more personal factors and/or comorbidities  Examination of  Body Systems (PT Eval Complexity): total of 3 or more elements  Clinical Presentation (PT Evaluation Complexity): evolving  Clinical Decision Making (PT Evaluation Complexity): moderate complexity  Overall Complexity (PT Evaluation Complexity): moderate complexity     PT Charges       Row Name 03/27/25 1226             Time Calculation    Start Time 0937  -CL      Stop Time 1001  -CL      Time Calculation (min) 24 min  -CL      PT Received On 03/27/25  -CL      PT - Next Appointment 03/28/25  -CL      PT Goal Re-Cert Due Date 04/10/25  -CL         Time Calculation- PT    Total Timed Code Minutes- PT 0 minute(s)  -CL                User Key  (r) = Recorded By, (t) = Taken By, (c) = Cosigned By      Initials Name Provider Type    CL Debbie Solis, PT Physical Therapist                  Therapy Charges for Today       Code Description Service Date Service Provider Modifiers Qty    06226423200 HC PT EVAL MOD COMPLEXITY 4 3/27/2025 Debbie Solis, PT GP 1            PT G-Codes  Outcome Measure Options: AM-PAC 6 Clicks Basic Mobility (PT)  AM-PAC 6 Clicks Score (PT): 18  PT Discharge Summary  Anticipated Discharge Disposition (PT): home    Debbie Solis PT  3/27/2025

## 2025-03-27 NOTE — THERAPY TREATMENT NOTE
Acute Care - Speech Language Pathology   Swallow Treatment Note  Abhijeet     Patient Name: Serina Powell  : 1970  MRN: [id number]  Today's Date: 3/27/2025               Admit Date: 3/24/2025    Visit Dx:     ICD-10-CM ICD-9-CM   1. Altered mental status, unspecified altered mental status type  R41.82 780.97   2. Hyperglycemia  R73.9 790.29   3. Fever, unspecified fever cause  R50.9 780.60   4. Sepsis, due to unspecified organism, unspecified whether acute organ dysfunction present  A41.9 038.9     995.91   5. RSV infection  B33.8 079.6   6. WILLIAM (acute kidney injury)  N17.9 584.9   7. Hypokalemia  E87.6 276.8   8. Calculus of gallbladder without cholecystitis without obstruction  K80.20 574.20   9. 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    Diabetic peripheral neuropathy associated with type 2 diabetes mellitus    Acute osteomyelitis of left foot    Obesity    Healthcare maintenance    Polycythemia    Seizures    Tobacco dependence syndrome    Verruca vulgaris    Other dorsalgia    Mental health disorder    Bipolar 1 disorder    Personal history of tobacco use, presenting hazards to health    Ear pain, bilateral    Altered mental status     Past Medical History:   Diagnosis Date    Anaclitic depression 2013    COPD (chronic obstructive pulmonary disease)     Depression     Diabetes mellitus     Hyperlipidemia     Hypertension     Injury of back     Obesity     Seizures     Sleep apnea     Syncope without other cardiovascular symptoms      Past Surgical History:   Procedure Laterality Date    ADENOIDECTOMY      FOOT WOUND CLOSURE Left      INCISION AND DRAINAGE OF WOUND Left 06/21/2023    Procedure: incison and draiange, left Second and third partial ray resection, applciation of wound vac;  Surgeon: KASIE Donahue DPM;  Location: [facility] MAIN OR;  Service: Podiatry;  Laterality: Left;    LAPAROSCOPIC TUBAL LIGATION      ORIF HUMERUS FRACTURE Left 02/10/2021    Procedure: HUMERUS PROXIMAL OPEN REDUCTION INTERNAL FIXATION;  Surgeon: Julián Tinoco MD;  Location: [facility] MAIN OR;  Service: Orthopedics;  Laterality: Left;    TONSILLECTOMY         SLP Recommendation and Plan          SWALLOW EVALUATION (Last 72 Hours)            EDUCATION  The patient has been educated in the following areas:   Dysphagia (Swallowing Impairment) Oral Care/Hydration.        SLP GOALS       Row Name 03/27/25 1200       (LTG) Swallow    (LTG) Swallow Pt will maximize swallow function for least restrictive PO diet, exhibiting no complication associated with dysphagia, adequate PO intake, and demonstrating independent use of swallow compensation.  -CB    Sondheimer (Swallow Long Term Goal) with minimal cues (75-90% accuracy)  -CB    Time Frame (Swallow Long Term Goal) by discharge  -CB    Progress/Outcomes (Swallow Long Term Goal) goal ongoing  -CB       (STG) Swallow 1    (STG) Swallow 1 Patient will participate in ongoing assessment of swallow, including reevaluation clinically and/or including instrumental assessment of swallow if indicated, to further assess swallow function .  -CB    Sondheimer (Swallow Short Term Goal 1) with minimal cues (75-90% accuracy)  -CB    Time Frame (Swallow Short Term Goal 1) 1 week  -CB    Progress/Outcomes (Swallow Short Term Goal 1) goal ongoing  -CB       (STG) Swallow 2    (STG) Swallow 2 The patient will participate in a full meal assessment to determine safety and adequacy of recommended diet, independent use of safe swallow compensations, and additional goals/recommendations to follow.  -CB    Sondheimer (Swallow Short Term  Goal 2) with minimal cues (75-90% accuracy)  -CB    Time Frame (Swallow Short Term Goal 2) 1 week  -CB    Progress/Outcomes (Swallow Short Term Goal 2) goal ongoing  -CB    Comment (Swallow Short Term Goal 2) Patient was seen for DT/meal at lunch. Patient is currelntly receiving a mechanical ground diet. Patient was sitting upright at 90 degree hip flexion appropriately during meal. Patient is edentulous. Patient reports that she has dentures but does not wear them ever. Patient is cautious with her choice of foods to eat as she avoids certain foods that are difficult to munch. Patient independently takes small bites. No clearing of throat, cough and/or vocal changes were identified. Patient cleared oral cavity effectively between bites with no oral residue. Patient took sips of thins via straw without overt s/s of aspiration. No wet vocal quality was detected during conversational exchange. ST will continue to follow to ensure safety and adequacy of recommended diet  and independence with safe swallow strategies.  -CB              User Key  (r) = Recorded By, (t) = Taken By, (c) = Cosigned By      Initials Name Provider Type    Gretchen Meneses, SLP Speech and Language Pathologist                                     Time Calculation:                HAO Newell  3/27/2025

## 2025-03-27 NOTE — PLAN OF CARE
Goal Outcome Evaluation:  Plan of Care Reviewed With: patient        Progress: improving  Outcome Evaluation: Pt resting this shift, c/o left hip pain, dayshift provider ordered Ibuprofen x1, pt expressed relief with Ibuprofen. Pt is now alert and oriented, able to make her needs know. Med sitter and in person sitter were discontinued as pt did not meet the criteria to continue either one. Pt continues with contact/droplet precautions, continues with iv Flagyl and Rocephin. Call light within reach, bed alarm in place.

## 2025-03-27 NOTE — THERAPY EVALUATION
Patient Name: Serina Powell  : 1970    MRN: [id number]                              Today's Date: 3/27/2025       Admit Date: 3/24/2025    Visit Dx:     ICD-10-CM ICD-9-CM   1. Altered mental status, unspecified altered mental status type  R41.82 780.97   2. Hyperglycemia  R73.9 790.29   3. Fever, unspecified fever cause  R50.9 780.60   4. Sepsis, due to unspecified organism, unspecified whether acute organ dysfunction present  A41.9 038.9     995.91   5. RSV infection  B33.8 079.6   6. WILLIAM (acute kidney injury)  N17.9 584.9   7. Hypokalemia  E87.6 276.8   8. Calculus of gallbladder without cholecystitis without obstruction  K80.20 574.20   9. 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    Diabetic peripheral neuropathy associated with type 2 diabetes mellitus    Acute osteomyelitis of left foot    Obesity    Healthcare maintenance    Polycythemia    Seizures    Tobacco dependence syndrome    Verruca vulgaris    Other dorsalgia    Mental health disorder    Bipolar 1 disorder    Personal history of tobacco use, presenting hazards to health    Ear pain, bilateral    Altered mental status     Past Medical History:   Diagnosis Date    Anaclitic depression 2013    COPD (chronic obstructive pulmonary disease)     Depression     Diabetes mellitus     Hyperlipidemia     Hypertension     Injury of back     Obesity     Seizures     Sleep apnea     Syncope without other cardiovascular symptoms      Past Surgical History:   Procedure Laterality Date    ADENOIDECTOMY      FOOT WOUND CLOSURE Left     INCISION AND DRAINAGE OF WOUND Left 2023     Procedure: incison and draiange, left Second and third partial ray resection, applciation of wound vac;  Surgeon: KASIE Donahue DPM;  Location: [facility] MAIN OR;  Service: Podiatry;  Laterality: Left;    LAPAROSCOPIC TUBAL LIGATION      ORIF HUMERUS FRACTURE Left 02/10/2021    Procedure: HUMERUS PROXIMAL OPEN REDUCTION INTERNAL FIXATION;  Surgeon: Julián Tinoco MD;  Location: [facility] MAIN OR;  Service: Orthopedics;  Laterality: Left;    TONSILLECTOMY        General Information       Row Name 03/27/25 1628 03/27/25 0942       OT Time and Intention    Document Type evaluation  -MP evaluation  -MP    Mode of Treatment occupational therapy  -MP --    Patient Effort good  -MP good  -MP      Row Name 03/27/25 1628 03/27/25 0942       General Information    Patient Profile Reviewed yes  -MP yes  -MP    Prior Level of Function independent:;ADL's  -MP independent:;ADL's  -MP      Row Name 03/27/25 1628          Living Environment    Current Living Arrangements apartment  -       Row Name 03/27/25 1628          Cognition    Orientation Status (Cognition) oriented x 3  -MP               User Key  (r) = Recorded By, (t) = Taken By, (c) = Cosigned By      Initials Name Provider Type    MP Krish Nick OT Occupational Therapist                     Mobility/ADL's       Row Name 03/27/25 1631          Bed Mobility    Bed Mobility supine-sit  -MP     Supine-Sit Deming (Bed Mobility) contact guard  -       Row Name 03/27/25 1631          Bed-Chair Transfer    Bed-Chair [place]ing (Transfers) supervision  -       Row Name 03/27/25 1631          Sit-Stand Transfer    Sit-Stand [place]ing (Transfers) supervision  -       Row Name 03/27/25 1631          Activities of Daily Living    BADL Assessment/Intervention lower body dressing  -MP       Row Name 03/27/25 1631          Lower Body Dressing Assessment/Training    Deming Level (Lower Body Dressing) don;doff;socks;maximum assist (25% patient effort)  -MP                User Key  (r) = Recorded By, (t) = Taken By, (c) = Cosigned By      Initials Name Provider Type    Krish Bravo OT Occupational Therapist                   Obj/Interventions       Row Name 03/27/25 1638          Range of Motion Comprehensive    Comment, General Range of Motion BUE WFL  -MP       Row Name 03/27/25 1638          Strength Comprehensive (MMT)    Comment, General Manual Muscle Testing (MMT) Assessment BUE 4/5  -MP       Row Name 03/27/25 1638          Balance    Balance Interventions sitting;standing;supported;static;dynamic;sit to stand  -MP               User Key  (r) = Recorded By, (t) = Taken By, (c) = Cosigned By      Initials Name Provider Type    Krish Bravo OT Occupational Therapist                   Goals/Plan    No documentation.                  Clinical Impression       Row Name 03/27/25 1638          Pain Assessment    Pretreatment Pain Rating 4/10  -MP     Posttreatment Pain Rating 4/10  -MP     Pain Location calf  -MP     Pain Side/Orientation left  -MP       Row Name 03/27/25 1638          Plan of Care Review    Plan of Care Reviewed With patient  -MP     Progress no change  -MP     Outcome Evaluation Pt. is a 54 y.o. female with medical hx of COPD, HTN, GERD, DM, and anxiety/depression who presents with confusion and unwitnessed fall as well as 2 wk hx of abdominal pain. CXR and CT of head with no acute findings. She is positive for RSV as well as positive UDS.  Pt. states she lives at home w/ daughter and maintains ADL independence at baseline, two granddaughters also live at home Pt. adverse to OOB activity but agrees with encouragement, supervision for functional transfers, incresaed difficulty ambulating due to BLE swelling and increased pain, rolling walker support provided. Pt. requires increased assist for LB ADLs this date due to increased swelling. Anticpiate patient close to baseline functional independence, confusion resolving and safe for  d/c home once medically stable. Pt. does not require furthur skilled OT at this time.  -MP       Row Name 03/27/25 1638          Therapy Assessment/Plan (OT)    Rehab Potential (OT) good  -MP     Criteria for Skilled Therapeutic Interventions Met (OT) no problems identified which require skilled intervention;does not meet criteria for skilled intervention;no  -MP     Therapy Frequency (OT) evaluation only  -MP       Row Name 03/27/25 1638          Therapy Plan Review/Discharge Plan (OT)    Anticipated Discharge Disposition (OT) home with assist  -MP       Row Name 03/27/25 1638          Vital Signs    Pre Patient Position Supine  -MP     Intra Patient Position Standing  -MP     Post Patient Position Sitting  -MP       Row Name 03/27/25 1638          Positioning and Restraints    Pre-Treatment Position in bed  -MP     Post Treatment Position chair  -MP     In Chair sitting;call light within reach;encouraged to call for assist;exit alarm on  -MP               User Key  (r) = Recorded By, (t) = Taken By, (c) = Cosigned By      Initials Name Provider Type    Krish Bravo, OT Occupational Therapist                   Outcome Measures       Row Name 03/27/25 1225          How much help from another person do you currently need...    Turning from your back to your side while in flat bed without using bedrails? 4  -CL     Moving from lying on back to sitting on the side of a flat bed without bedrails? 3  -CL     Moving to and from a bed to a chair (including a wheelchair)? 3  -CL     Standing up from a chair using your arms (e.g., wheelchair, bedside chair)? 3  -CL     Climbing 3-5 steps with a railing? 2  -CL     To walk in hospital room? 3  -CL     AM-PAC 6 Clicks Score (PT) 18  -CL     Highest Level of Mobility Goal 6 --> Walk 10 steps or more  -CL       Row Name 03/27/25 1225          Functional Assessment    Outcome Measure Options AM-PAC 6 Clicks Basic Mobility (PT)  -CL               User Key  (r) = Recorded  By, (t) = Taken By, (c) = Cosigned By      Initials Name Provider Type    Debbie Acosta, PT Physical Therapist                      OT Recommendation and Plan  Therapy Frequency (OT): evaluation only  Plan of Care Review  Plan of Care Reviewed With: patient  Progress: no change  Outcome Evaluation: Pt. is a 54 y.o. female with medical hx of COPD, HTN, GERD, DM, and anxiety/depression who presents with confusion and unwitnessed fall as well as 2 wk hx of abdominal pain. CXR and CT of head with no acute findings. She is positive for RSV as well as positive UDS.  Pt. states she lives at home w/ daughter and maintains ADL independence at baseline, two granddaughters also live at home Pt. adverse to OOB activity but agrees with encouragement, supervision for functional transfers, incresaed difficulty ambulating due to BLE swelling and increased pain, rolling walker support provided. Pt. requires increased assist for LB ADLs this date due to increased swelling. Anticpiate patient close to baseline functional independence, confusion resolving and safe for d/c home once medically stable. Pt. does not require furthur skilled OT at this time.     Time Calculation:         Time Calculation- OT       Row Name 03/27/25 1643             Time Calculation- OT    OT Start Time 0937  -MP      OT Stop Time 1001  -MP      OT Time Calculation (min) 24 min  -MP      Total Timed Code Minutes- OT 0 minute(s)  -MP      OT Received On 03/27/25  -                User Key  (r) = Recorded By, (t) = Taken By, (c) = Cosigned By      Initials Name Provider Type    Krish Bravo OT Occupational Therapist                  Therapy Charges for Today       Code Description Service Date Service Provider Modifiers Qty    [id number]  OT EVAL LOW COMPLEXITY 4 3/27/2025 Krihs Nick OT GO 1                 Krish Nick OT  3/27/2025

## 2025-03-27 NOTE — PROGRESS NOTES
[facility] Progress Note      Patient Name: Serina Powell  : 1970  MRN: [id number]  Primary Care Physician:  Barbara Couch APRN  Date of admission: 3/24/2025    Subjective     Interval History:     Patient seen and examined this morning.  Sitting comfortably  Feeling little better    Review of Systems:   As noted above    Objective     Vitals:   Temp:  [97.5 °F (36.4 °C)-98.9 °F (37.2 °C)] 97.5 °F (36.4 °C)  Heart Rate:  [65-82] 73  Resp:  [15-22] 16  BP: (112-148)/(67-97) 126/83    Intake/Output Summary (Last 24 hours) at 3/27/2025 1001  Last data filed at 3/27/2025 0333  Gross per 24 hour   Intake 240 ml   Output 1000 ml   Net -760 ml       Physical Exam:    General Appearance: Chronically ill-appearing  HEENT: oral mucosa dry, nonicteric sclera  Neck: supple, no JVD  Lungs: CTA  Heart: RRR, normal S1 and S2  Abdomen: soft, nondistended  Extremities: 1+ edema  Neuro: Awake alert and moving all extremities    Scheduled Meds:     budesonide-formoterol, 2 puff, Inhalation, BID - RT  cefTRIAXone, 2,000 mg, Intravenous, Q24H  insulin glargine, 20 Units, Subcutaneous, Nightly  insulin lispro, 11 Units, Subcutaneous, TID With Meals  insulin lispro, 2-9 Units, Subcutaneous, 4x Daily With Meals & Nightly  ipratropium-albuterol, 3 mL, Nebulization, 4x Daily - RT  metroNIDAZOLE, 500 mg, Intravenous, Q8H  [Held by provider] nebivolol, 10 mg, Oral, Daily  pantoprazole, 40 mg, Oral, BID AC  QUEtiapine, 25 mg, Oral, Nightly   And  QUEtiapine, 200 mg, Oral, Nightly      IV Meds:   sodium chloride, 100 mL/hr, Last Rate: 100 mL/hr (25 0003)        Results Reviewed:   I have personally reviewed the results from the time of this admission to 3/27/2025 10:01 EDT     Results from last 7 days   Lab Units 25  0344 25  1435 25  0220 25  0551 25  1134   SODIUM mmol/L 134*  --  135* 141 137   POTASSIUM mmol/L 3.6 3.8 3.0* 3.0* 3.0*   CHLORIDE mmol/L 103  --  99  101 89*   CO2 mmol/L 21.2*  --  25.3 26.0 29.9*   BUN mg/dL 19  --  22* 23* 22*   CREATININE mg/dL 0.98  --  1.17* 1.33* 1.73*   CALCIUM mg/dL 7.7*  --  7.8* 8.6 10.0   BILIRUBIN mg/dL  --   --   --   --  0.6   ALK PHOS U/L  --   --   --   --  75   ALT (SGPT) U/L  --   --   --   --  18   AST (SGOT) U/L  --   --   --   --  65*   GLUCOSE mg/dL 189*  --  201* 229* 479*     Estimated Creatinine Clearance: 88.8 mL/min (by C-G formula based on SCr of 0.98 mg/dL).  Results from last 7 days   Lab Units 03/27/25  0344 03/26/25  1435 03/26/25  0220 03/25/25  0551   MAGNESIUM mg/dL 2.1  --  2.2 2.7*   PHOSPHORUS mg/dL 2.5 1.9* 1.7* 2.5         Results from last 7 days   Lab Units 03/27/25  0344 03/26/25  0405 03/25/25  0551 03/24/25  1134   WBC 10*3/mm3 11.23* 15.89* 18.34* 16.05*   HEMOGLOBIN g/dL 9.8* 11.3* 12.7 13.6   PLATELETS 10*3/mm3 137* 137* 148 187     Results from last 7 days   Lab Units 03/24/25  1134   INR  1.18*       Assessment / Plan     ASSESSMENT:    Acute kidney injury.  Most likely prerenal in etiology.  Renal function improving with IV hydration.  Creatinine 0.98 this morning from peak of 1.73.    Hypokalemia.  Resolved  RSV infection  Altered mental status.  Seems to be due to metabolic encephalopathy.  Improving  Alkalosis.  Metabolic and respiratory.  Improved  Proteinuria.  May have underlying diabetic glomerulosclerosis  Hypovolemia    PLAN:    Discontinue IV fluids  Patient will benefit from ACE inhibitor or ARB for proteinuria    Tyson Villanueva MD  03/27/25  10:01 EDT    [facility]  [phone]

## 2025-03-27 NOTE — CONSULTS
Nutrition Services  Patient Name: Serina Powell  YOB: 1970  MRN: [id number]  Admission date: 3/24/2025    NUTRITION SCREENING      Trending Narrative: 3/27: Pt assessed due admission screening. Pt has been eating well, generally with 75% or better intake. Has some chronic chewing challenges due to edentulism, but does not wear dentures. ST also is following and has recommended modified diet, which is in place. Pt confirms good appetite/diet tolerance -- will add consistent carbohydrate modification to help optimize glucose control.        PO Diet: Diet: Regular/House; Texture: Mechanical Ground (NDD 2); Fluid Consistency: Thin (IDDSI 0)   PO Supplements: None ordered   Trending PO Intake:  % at all recent meals        Nutritionally-Pertinent Medications RDN Reviewed, C/W clinical course         Labs (reviewed below): Hyperglycemia -- noted Hx DM     Results from last 7 days   Lab Units 03/27/25  0344 03/26/25  1435 03/26/25  0220 03/25/25  0551 03/24/25  1134   SODIUM mmol/L 134*  --  135* 141 137   POTASSIUM mmol/L 3.6 3.8 3.0* 3.0* 3.0*   CHLORIDE mmol/L 103  --  99 101 89*   CO2 mmol/L 21.2*  --  25.3 26.0 29.9*   BUN mg/dL 19  --  22* 23* 22*   CREATININE mg/dL 0.98  --  1.17* 1.33* 1.73*   CALCIUM mg/dL 7.7*  --  7.8* 8.6 10.0   BILIRUBIN mg/dL  --   --   --   --  0.6   ALK PHOS U/L  --   --   --   --  75   ALT (SGPT) U/L  --   --   --   --  18   AST (SGOT) U/L  --   --   --   --  65*   GLUCOSE mg/dL 189*  --  201* 229* 479*     Results from last 7 days   Lab Units 03/27/25  0344 03/26/25  0405 03/26/25  0220 03/25/25  0551   MAGNESIUM mg/dL 2.1  --  2.2 2.7*   PHOSPHORUS mg/dL 2.5   < > 1.7* 2.5   HEMOGLOBIN g/dL 9.8*   < >  --  12.7   HEMATOCRIT % 30.5*   < >  --  39.0    < > = values in this interval not displayed.     Lab Results   Component Value Date    HGBA1C 8.06 (H) 02/14/2025          GI Function:  No BM yet this admission        Skin: No breakdown documented        Weight  "Review: Estimated body mass index is 49.16 kg/m² as calculated from the following:    Height as of this encounter: 165.1 cm (65\").    Weight as of this encounter: 134 kg (295 lb 6.7 oz).    Comment:   3/27: 5% weight loss in six months - not significant for timeframe      Wt Readings from Last 30 Encounters:   03/27/25 0333 134 kg (295 lb 6.7 oz)   03/26/25 0500 130 kg (287 lb 11.2 oz)   03/25/25 0500 133 kg (293 lb 3.4 oz)   03/24/25 1719 133 kg (294 lb 1.5 oz)   03/24/25 1119 135 kg (297 lb 9.9 oz)   02/14/25 1309 135 kg (297 lb 14.4 oz)   11/25/24 1356 (!) 137 kg (301 lb 12.8 oz)   03/22/24 1347 136 kg (299 lb 6.4 oz)   01/15/24 0905 (!) 141 kg (310 lb)   11/06/23 0948 (!) 141 kg (310 lb)   10/18/23 0852 (!) 141 kg (310 lb)   07/26/23 1015 (!) 141 kg (310 lb)   06/22/23 0327 (!) 141 kg (310 lb 13.6 oz)   06/21/23 0358 136 kg (300 lb)   02/09/21 2041 (!) 140 kg (309 lb 8.4 oz)   02/09/21 1533 (!) 138 kg (303 lb 5.7 oz)   02/09/21 1022 (!) 150 kg (331 lb)   06/10/20 0222 (!) 150 kg (330 lb 4 oz)   05/15/19 1038 (!) 146 kg (322 lb)   02/06/19 1116 (!) 147 kg (323 lb 12.8 oz)   09/13/18 1554 (!) 148 kg (326 lb)   02/27/18 0927 (!) 149 kg (327 lb 14.4 oz)   10/11/16 1339 (!) 168 kg (369 lb 15.9 oz)   10/10/16 1045 (!) 166 kg (366 lb 15.9 oz)   06/29/16 1330 (!) 155 kg (342 lb 2 oz)   04/29/16 1342 (!) 158 kg (347 lb 15.9 oz)              Trending Physical   Appearance, NFPE 3/27: NFPE completed and not consistent with nutrition diagnosis of malnutrition at this time using AND/ASPEN criteria             Nutrition Problem Statement: Altered nutrition-related laboratory values R/t endocrine disorder; as evidenced by hyperglycemia with Hx DM.        Nutrition Intervention: Add Consistent Carbohydrate modifications to diet order.    Will defer texture to ST, who is following.          Monitoring/Evaluation PO intake, Pertinent labs, Weight, Skin status, GI status, POC/GOC        RD to follow up per " protocol.    Electronically signed by:  Cassy Lema RD  03/27/25 14:49 EDT

## 2025-03-28 ENCOUNTER — READMISSION MANAGEMENT (OUTPATIENT)
Dept: CALL CENTER | Facility: HOSPITAL | Age: 55
End: 2025-03-28
Payer: MEDICAID

## 2025-03-28 VITALS
DIASTOLIC BLOOD PRESSURE: 81 MMHG | RESPIRATION RATE: 14 BRPM | OXYGEN SATURATION: 98 % | WEIGHT: 293 LBS | SYSTOLIC BLOOD PRESSURE: 139 MMHG | HEART RATE: 77 BPM | BODY MASS INDEX: 48.82 KG/M2 | TEMPERATURE: 97.8 F | HEIGHT: 65 IN

## 2025-03-28 LAB
ALBUMIN SERPL-MCNC: 2.5 G/DL (ref 3.5–5.2)
ANION GAP SERPL CALCULATED.3IONS-SCNC: 9.2 MMOL/L (ref 5–15)
BUN SERPL-MCNC: 18 MG/DL (ref 6–20)
BUN/CREAT SERPL: 17.5 (ref 7–25)
CALCIUM SPEC-SCNC: 8.1 MG/DL (ref 8.6–10.5)
CHLORIDE SERPL-SCNC: 107 MMOL/L (ref 98–107)
CO2 SERPL-SCNC: 22.8 MMOL/L (ref 22–29)
CREAT SERPL-MCNC: 1.03 MG/DL (ref 0.57–1)
EGFRCR SERPLBLD CKD-EPI 2021: 63.9 ML/MIN/1.73
GLUCOSE BLDC GLUCOMTR-MCNC: 167 MG/DL (ref 70–105)
GLUCOSE SERPL-MCNC: 176 MG/DL (ref 65–99)
PHOSPHATE SERPL-MCNC: 3 MG/DL (ref 2.5–4.5)
POTASSIUM SERPL-SCNC: 3.6 MMOL/L (ref 3.5–5.2)
SODIUM SERPL-SCNC: 139 MMOL/L (ref 136–145)

## 2025-03-28 PROCEDURE — 63710000001 INSULIN LISPRO (HUMAN) PER 5 UNITS: Performed by: INTERNAL MEDICINE

## 2025-03-28 PROCEDURE — 82948 REAGENT STRIP/BLOOD GLUCOSE: CPT | Performed by: STUDENT IN AN ORGANIZED HEALTH CARE EDUCATION/TRAINING PROGRAM

## 2025-03-28 PROCEDURE — 80069 RENAL FUNCTION PANEL: CPT | Performed by: INTERNAL MEDICINE

## 2025-03-28 PROCEDURE — 63710000001 INSULIN LISPRO (HUMAN) PER 5 UNITS: Performed by: STUDENT IN AN ORGANIZED HEALTH CARE EDUCATION/TRAINING PROGRAM

## 2025-03-28 RX ORDER — CEPHALEXIN 500 MG/1
500 CAPSULE ORAL 4 TIMES DAILY
Qty: 28 CAPSULE | Refills: 0 | Status: SHIPPED | OUTPATIENT
Start: 2025-03-28 | End: 2025-04-04

## 2025-03-28 RX ORDER — CEPHALEXIN 500 MG/1
500 CAPSULE ORAL 4 TIMES DAILY
Qty: 20 CAPSULE | Refills: 0 | Status: SHIPPED | OUTPATIENT
Start: 2025-03-28 | End: 2025-04-02

## 2025-03-28 RX ADMIN — PANTOPRAZOLE SODIUM 40 MG: 40 TABLET, DELAYED RELEASE ORAL at 09:49

## 2025-03-28 RX ADMIN — NICOTINE 1 PATCH: 21 PATCH TRANSDERMAL at 09:50

## 2025-03-28 RX ADMIN — INSULIN LISPRO 2 UNITS: 100 INJECTION, SOLUTION INTRAVENOUS; SUBCUTANEOUS at 09:50

## 2025-03-28 RX ADMIN — IBUPROFEN 800 MG: 400 TABLET, FILM COATED ORAL at 02:02

## 2025-03-28 RX ADMIN — IBUPROFEN 800 MG: 400 TABLET, FILM COATED ORAL at 09:54

## 2025-03-28 RX ADMIN — INSULIN LISPRO 11 UNITS: 100 INJECTION, SOLUTION INTRAVENOUS; SUBCUTANEOUS at 09:49

## 2025-03-28 NOTE — OUTREACH NOTE
Prep Survey      Flowsheet Row Responses   [facility] patient discharged from? [facility]   Is LACE score < 7 ? No   Eligibility [facility]   Date of Admission 03/24/25   Date of Discharge 03/28/25   Discharge diagnosis Altered mental status   Does the patient have one of the following disease processes/diagnoses(primary or secondary)? Other   Prep survey completed? Yes            Shruti GARCIA - Registered Nurse

## 2025-03-28 NOTE — SIGNIFICANT NOTE
03/28/25 1253   OTHER   Discipline physical therapist   Rehab Time/Intention   Session Not Performed other (see comments)  (hospital dc pending)   Therapy Assessment/Plan (PT)   Criteria for Skilled Interventions Met (PT) yes;skilled treatment is necessary

## 2025-03-28 NOTE — CASE MANAGEMENT/SOCIAL WORK
Case Management Discharge Note      Final Note: Home with family    Provided Post Acute Provider List?: N/A  Provided Post Acute Provider Quality & Resource List?: N/A    Selected Continued Care - Admitted Since 3/24/2025          Transportation Services  Private: Car    Final Discharge Disposition Code: 01 - home or self-care

## 2025-03-28 NOTE — PAYOR COMM NOTE
"This is discharge notification for Serina Powell.  Discharged on 3/28/25 routine home.    AUTHORIZATION : [id number]   THANK YOU.      Trudy Fisher RN, CCM  Utilization Nurse  [facility]   [address] [place] [address]   [phone]  Fx [phone]     Serina Powell (54 y.o. Female)       Date of Birth   1970    Social Security Number       Address   [address] [place] [address]    Home Phone       MRN   [id number]       Jewish   Jainism    Marital Status   Single                            Admission Date   3/24/2025    Admission Type   Emergency    Admitting Provider   Cullen Ascencio MD    Attending Provider       Department, Room/Bed   [facility] CARE, 2126/1       Discharge Date   3/28/2025    Discharge Disposition   Home or Self Care    Discharge Destination                                 Attending Provider: (none)   Allergies: Acetaminophen, Aspirin, Codeine, Pregabalin, Topiramate, Valdecoxib, Tramadol, Naproxen Sodium, Tylenol With Codeine #3 [Acetaminophen-codeine]    Isolation: Droplet, Contact   Infection: MRSA (06/22/23), RSV (03/24/25)   Code Status: Prior    Ht: 165.1 cm (65\")   Wt: 135 kg (297 lb 13.5 oz)    Admission Cmt: None   Principal Problem: Altered mental status [R41.82]                   Active Insurance as of 3/24/2025       Primary Coverage       Payor Plan Insurance Group Employer/Plan Group    [employer] [place] [employer] - [employer] [id number]       Payor Plan Address Payor Plan Phone Number Payor Plan Fax Number Effective Dates    MAIL STOP:   12/1/2023 - None Entered    [address] [address]       [facility] [address]         Subscriber Name Subscriber Birth Date Member ID       SERINA POWELL 1970 [id number]                     Emergency Contacts        (Rel.) Home Phone Work Phone Mobile Phone    PUSHPA THOMPSON (Mother) [phone] -- --    [place],Vanessa -- -- " [phone]              Discharge Summary    No notes of this type exist for this encounter.       Discharge Order (From admission, onward)       Start     Ordered    03/28/25 0837  Discharge patient  Once        Expected Discharge Date: 03/28/25   Expected Discharge Time: Morning   Discharge Disposition: Home or Self Care   Physician of Record for Attribution - Please select from Treatment Team: ELIU LANTIGUA [[id number]]   Review needed by CMO to determine Physician of Record: No      Question Answer Comment   Physician of Record for Attribution - Please select from Treatment Team ELIU LANTIGUA    Review needed by CMO to determine Physician of Record No        03/28/25 0836

## 2025-03-28 NOTE — PROGRESS NOTES
[facility] Progress Note      Patient Name: Serina Powell  : 1970  MRN: [id number]  Primary Care Physician:  Barbara Couch APRN  Date of admission: 3/24/2025    Subjective     Interval History:     Patient sitting comfortably, feeling better  Denies any chest pain, nausea or vomiting    Review of Systems:   As noted above    Objective     Vitals:   Temp:  [97.4 °F (36.3 °C)-98.2 °F (36.8 °C)] 97.8 °F (36.6 °C)  Heart Rate:  [62-84] 77  Resp:  [14-20] 14  BP: ()/(60-87) 139/81  Flow (L/min) (Oxygen Therapy):  [0] 0    Intake/Output Summary (Last 24 hours) at 3/28/2025 1013  Last data filed at 3/28/2025 0914  Gross per 24 hour   Intake 720 ml   Output 1850 ml   Net -1130 ml       Physical Exam:    General Appearance: Chronically ill-appearing  HEENT: oral mucosa dry, nonicteric sclera  Neck: supple, no JVD  Lungs: CTA  Heart: RRR, normal S1 and S2  Abdomen: soft, nondistended  Extremities: 1+ edema  Neuro: Awake alert and moving all extremities    Scheduled Meds:     budesonide-formoterol, 2 puff, Inhalation, BID - RT  cefTRIAXone, 2,000 mg, Intravenous, Q24H  insulin glargine, 20 Units, Subcutaneous, Nightly  insulin lispro, 11 Units, Subcutaneous, TID With Meals  insulin lispro, 2-9 Units, Subcutaneous, 4x Daily With Meals & Nightly  ipratropium-albuterol, 3 mL, Nebulization, 4x Daily - RT  [Held by provider] nebivolol, 10 mg, Oral, Daily  nicotine, 1 patch, Transdermal, Q24H  pantoprazole, 40 mg, Oral, BID AC  QUEtiapine, 25 mg, Oral, Nightly   And  QUEtiapine, 200 mg, Oral, Nightly      IV Meds:          Results Reviewed:   I have personally reviewed the results from the time of this admission to 3/28/2025 10:13 EDT     Results from last 7 days   Lab Units 25  0520 25  0344 25  1435 25  0220 25  0551 25  1134   SODIUM mmol/L 139 134*  --  135*   < > 137   POTASSIUM mmol/L 3.6 3.6 3.8 3.0*   < > 3.0*   CHLORIDE mmol/L 107 103  --   99   < > 89*   CO2 mmol/L 22.8 21.2*  --  25.3   < > 29.9*   BUN mg/dL 18 19  --  22*   < > 22*   CREATININE mg/dL 1.03* 0.98  --  1.17*   < > 1.73*   CALCIUM mg/dL 8.1* 7.7*  --  7.8*   < > 10.0   BILIRUBIN mg/dL  --   --   --   --   --  0.6   ALK PHOS U/L  --   --   --   --   --  75   ALT (SGPT) U/L  --   --   --   --   --  18   AST (SGOT) U/L  --   --   --   --   --  65*   GLUCOSE mg/dL 176* 189*  --  201*   < > 479*    < > = values in this interval not displayed.     Estimated Creatinine Clearance: 84.9 mL/min (A) (by C-G formula based on SCr of 1.03 mg/dL (H)).  Results from last 7 days   Lab Units 03/28/25  0520 03/27/25  0344 03/26/25  1435 03/26/25  0220 03/25/25  0551   MAGNESIUM mg/dL  --  2.1  --  2.2 2.7*   PHOSPHORUS mg/dL 3.0 2.5 1.9* 1.7* 2.5         Results from last 7 days   Lab Units 03/27/25  0344 03/26/25  0405 03/25/25  0551 03/24/25  1134   WBC 10*3/mm3 11.23* 15.89* 18.34* 16.05*   HEMOGLOBIN g/dL 9.8* 11.3* 12.7 13.6   PLATELETS 10*3/mm3 137* 137* 148 187     Results from last 7 days   Lab Units 03/24/25  1134   INR  1.18*       Assessment / Plan     ASSESSMENT:    Acute kidney injury.  Most likely prerenal in etiology.  Renal function improving with IV hydration.  1.03 Mg/DL this morning from peak of 1.73.    Hypokalemia.  Resolved  RSV infection  Altered mental status.  Seems to be due to metabolic encephalopathy.  Improving  Alkalosis.  Metabolic and respiratory.  Improved  Proteinuria.  May have underlying diabetic glomerulosclerosis  Hypovolemia    PLAN:    Continue current treatment  Follow-up in 2 to 3 months as outpatient  I will consider ACE inhibitor or ARB as outpatient depending on blood pressure    Tyson Villanueva MD  03/28/25  10:13 EDT    [facility]  [phone]

## 2025-03-28 NOTE — PLAN OF CARE
Goal Outcome Evaluation:           Progress: no change          Patient alert and able to make needs known. Patient plans to discharge home with her daughter today. Patient has been calm and cooperative all night with vital signs within normal limits. Moderate complaints of pain to bottom and back secondary to uncomfortable bed. Med compliant with all ordered medications. Call light in reach. Continuing with all current plans of care.

## 2025-03-29 DIAGNOSIS — F41.9 ANXIETY: ICD-10-CM

## 2025-03-29 LAB
BACTERIA SPEC AEROBE CULT: NORMAL
BACTERIA SPEC AEROBE CULT: NORMAL

## 2025-03-30 RX ORDER — PANTOPRAZOLE SODIUM 40 MG/1
40 TABLET, DELAYED RELEASE ORAL DAILY
Qty: 30 TABLET | Refills: 0 | Status: SHIPPED | OUTPATIENT
Start: 2025-03-30 | End: 2025-04-30

## 2025-03-30 NOTE — DISCHARGE SUMMARY
[facility] Medicine Services  Discharge Summary    Date of Service: 3/28/2025  Patient Name: Serina Powell  : 1970  MRN: [id number]    Date of Admission: 3/24/2025  Discharge Diagnosis:     Left lower extremity cellulitis  Sepsis  AMS with agitation  Cholelithiasis with gallbladder wall distention  Esophagitis  RSV infection  WILLIAM  Diabetes mellitus type 2  Hypertension    Date of Discharge:3/28/2025  Primary Care Physician: Barbara Couch APRN          Hospital Course         Hospital Course:    Patient is a 54-year-old lady who was admitted and treated for left lower extremity cellulitis.  She was started on IV ceftriaxone and vancomycin.  Blood cultures came back showing no growth.  MRSA PCR was negative.  Vancomycin was discontinued and ceftriaxone continued.  She had recurrent fevers which eventually resolved.  Cellulitis started to resolve in the left lower extremities.  She gradually improved.  She was followed by nephrology for acute kidney injury and was treated with IV fluids.  Renal function gradually improved.  Incidentally respiratory panel came back positive for RSV infection.  Supportive care was observed.  Droplet isolation was instituted.  Oxygen saturations remained in the high 90s on room air.    Patient had some right upper quadrant abdominal pain and CT scan showed cholelithiasis with gallbladder distention.  Ultrasound was done to evaluate for possible cholecystitis but the result was equivocal.  General surgery saw the patient and ordered a HIDA scan which ruled out acute cholecystitis.  General surgery signed off.  Of note is that CT scan of the abdomen had also shown findings suggestive of esophagitis and patient was started on Protonix.    The patient has improved overall and is being discharged home on Keflex for complete treatment of left lower extremity cellulitis.        DISCHARGE Follow Up Recommendations:-Follow-up PCP in 1 week, follow-up with general  surgery in 1 week.  Follow-up with nephrology in 1 week.      Day of Discharge     Vital Signs:       Physical Exam:  Physical Exam  Constitutional:       Appearance: Normal appearance.   HENT:      Head: Normocephalic and atraumatic.      Nose: Nose normal.      Mouth/Throat:      Mouth: Mucous membranes are moist.   Eyes:      Extraocular Movements: Extraocular movements intact.      Pupils: Pupils are equal, round, and reactive to light.   Cardiovascular:      Rate and Rhythm: Normal rate and regular rhythm.   Pulmonary:      Effort: Pulmonary effort is normal.      Breath sounds: Normal breath sounds.   Abdominal:      General: Abdomen is flat. Bowel sounds are normal.      Palpations: Abdomen is soft.   Musculoskeletal:      Cervical back: Normal range of motion and neck supple.   Skin:     General: Skin is warm and dry.   Neurological:      General: No focal deficit present.      Mental Status: She is alert and oriented to person, place, and time.   Psychiatric:         Mood and Affect: Mood normal.         Behavior: Behavior normal.         Thought Content: Thought content normal.         Judgment: Judgment normal.            Pertinent  and/or Most Recent Results     LAB RESULTS:      Lab 03/27/25  0344 03/26/25  0405 03/25/25  0551 03/24/25  2124 03/24/25  1834 03/24/25  1427 03/24/25  1148 03/24/25  1134 03/24/25  1132   WBC 11.23* 15.89* 18.34*  --   --   --   --  16.05*  --    HEMOGLOBIN 9.8* 11.3* 12.7  --   --   --   --  13.6  --    HEMATOCRIT 30.5* 35.0 39.0  --   --   --   --  40.0  --    PLATELETS 137* 137* 148  --   --   --   --  187  --    NEUTROS ABS 7.92* 12.88* 15.64*  --   --   --   --  14.29*  --    IMMATURE GRANS (ABS) 0.15* 0.38* 0.23*  --   --   --   --  0.27*  --    LYMPHS ABS 2.30 1.54 1.40  --   --   --   --  0.73  --    MONOS ABS 0.74 1.02* 0.92*  --   --   --   --  0.72  --    EOS ABS 0.06 0.02 0.11  --   --   --   --  0.01  --    MCV 88.4 88.6 87.4  --   --   --   --  83.7  --     PROCALCITONIN  --   --  2.75*  --   --   --  3.29*  --   --    LACTATE  --   --   --  1.9 2.5* 4.8*  --   --  3.6*   PROTIME  --   --   --   --   --   --   --  14.9*  --    APTT  --   --   --   --   --   --   --  27.3  --          Lab 03/28/25  0520 03/27/25 0344 03/26/25  1435 03/26/25 0220 03/25/25  0551 03/24/25  1148 03/24/25  1134   SODIUM 139 134*  --  135* 141  --  137   POTASSIUM 3.6 3.6 3.8 3.0* 3.0*  --  3.0*   CHLORIDE 107 103  --  99 101  --  89*   CO2 22.8 21.2*  --  25.3 26.0  --  29.9*   ANION GAP 9.2 9.8  --  10.7 14.0  --  18.1*   BUN 18 19  --  22* 23*  --  22*   CREATININE 1.03* 0.98  --  1.17* 1.33*  --  1.73*   EGFR 63.9 67.9  --  54.9* 47.1*  --  34.3*   GLUCOSE 176* 189*  --  201* 229*  --  479*   CALCIUM 8.1* 7.7*  --  7.8* 8.6  --  10.0   MAGNESIUM  --  2.1  --  2.2 2.7*  --  1.4*   PHOSPHORUS 3.0 2.5 1.9* 1.7* 2.5  --  2.0*   TSH  --   --   --   --   --  0.958  --          Lab 03/28/25  0520 03/27/25  0344 03/26/25  0220 03/24/25  1148 03/24/25  1134   TOTAL PROTEIN  --   --   --   --  7.6   ALBUMIN 2.5* 2.4* 3.0*  --  4.0   GLOBULIN  --   --   --   --  3.6   ALT (SGPT)  --   --   --   --  18   AST (SGOT)  --   --   --   --  65*   BILIRUBIN  --   --   --   --  0.6   ALK PHOS  --   --   --   --  75   LIPASE  --   --   --  10*  --          Lab 03/24/25  1427 03/24/25  1148 03/24/25  1134   PROBNP  --  2,369.0*  --    HSTROP T 57* 58*  --    PROTIME  --   --  14.9*   INR  --   --  1.18*                 Lab 03/24/25  1147   PH, ARTERIAL 7.657*   PCO2, ARTERIAL 28.9*   PO2 ART 66.8*   O2 SATURATION ART 96.6   FIO2 21   HCO3 ART 32.4*   BASE EXCESS ART 11.9*     Brief Urine Lab Results  (Last result in the past 365 days)        Color   Clarity   Blood   Leuk Est   Nitrite   Protein   CREAT   Urine HCG        03/24/25 1142 Yellow   Cloudy  Comment: Result checked     Large (3+)   Negative   Negative   >=300 mg/dL (3+)                 Microbiology Results (last 10 days)       Procedure  Component Value - Date/Time    MRSA Screen, PCR (Inpatient) - Swab, Nares [[id number]]  (Normal) Collected: 03/25/25 0519    Lab Status: Final result Specimen: Swab from Nares Updated: 03/25/25 0716     MRSA PCR No MRSA Detected    Narrative:      The negative predictive value of this diagnostic test is high and should only be used to consider de-escalating anti-MRSA therapy. A positive result may indicate colonization with MRSA and must be correlated clinically.    Blood Culture - Blood, Arm, Right [[id number]]  (Normal) Collected: 03/24/25 1148    Lab Status: Final result Specimen: Blood from Arm, Right Updated: 03/29/25 1200     Blood Culture No growth at 5 days    Narrative:      Less than seven (7) mL's of blood was collected.  Insufficient quantity may yield false negative results.    Respiratory Panel PCR w/COVID-19(SARS-CoV-2) [place]/[facility]/LUIS ANGEL In-House, NP Swab in UTM/VTM, 2 HR TAT - Swab, Nasopharynx [[id number]]  (Abnormal) Collected: 03/24/25 1148    Lab Status: Final result Specimen: Swab from Nasopharynx Updated: 03/24/25 1249     ADENOVIRUS, PCR Not Detected     Coronavirus 229E Not Detected     Coronavirus HKU1 Not Detected     Coronavirus NL63 Not Detected     Coronavirus OC43 Not Detected     COVID19 Not Detected     Human Metapneumovirus Not Detected     Human Rhinovirus/Enterovirus Not Detected     Influenza A PCR Not Detected     Influenza B PCR Not Detected     Parainfluenza Virus 1 Not Detected     Parainfluenza Virus 2 Not Detected     Parainfluenza Virus 3 Not Detected     Parainfluenza Virus 4 Not Detected     RSV, PCR Detected     Bordetella pertussis pcr Not Detected     Bordetella parapertussis PCR Not Detected     Chlamydophila pneumoniae PCR Not Detected     Mycoplasma pneumo by PCR Not Detected    Narrative:      In the setting of a positive respiratory panel with a viral infection PLUS a negative procalcitonin without other underlying concern for bacterial infection, consider  observing off antibiotics or discontinuation of antibiotics and continue supportive care. If the respiratory panel is positive for atypical bacterial infection (Bordetella pertussis, Chlamydophila pneumoniae, or Mycoplasma pneumoniae), consider antibiotic de-escalation to target atypical bacterial infection.    Blood Culture - Blood, Arm, Left [[id number]]  (Normal) Collected: 03/24/25 1133    Lab Status: Final result Specimen: Blood from Arm, Left Updated: 03/29/25 1145     Blood Culture No growth at 5 days            NM HIDA SCAN WITHOUT PHARMACOLOGICAL INTERVENTION  Result Date: 3/26/2025  Impression: Impression: Negative for acute calculus cholecystitis. Electronically Signed: Elpidio Chavis MD  3/26/2025 1:32 PM EDT  Workstation ID: [id number]    US Abdomen Limited  Result Date: 3/25/2025  Impression: Impression: 1.Cholelithiasis with a positive sonographic Lala sign, but no abnormal gallbladder wall thickening or pericholecystic fluid. Findings are equivocal for acute cholecystitis. 2.Hepatomegaly with hepatic steatosis. Electronically Signed: Robles Douglas  3/25/2025 6:10 PM EDT  Workstation ID: [id number]    CT Abdomen Pelvis With Contrast  Result Date: 3/24/2025  Impression: Impression: 1.Mural thickening involving the visualized distal esophagus consistent with esophagitis. 2.Cholelithiasis with generalized gallbladder distention with no mural thickening or surrounding inflammatory change to suggest acute cholecystitis. Correlate with symptoms. 3.Chronic bilateral L5 spondylolysis without significant spondylolisthesis at L5/S1. Electronically Signed: Rigo Argueta MD  3/24/2025 12:57 PM EDT  Workstation ID: [id number]    CT Head Without Contrast  Result Date: 3/24/2025  Impression: Impression: No acute intracranial findings by CT. Electronically Signed: Elpidio Chavis MD  3/24/2025 12:54 PM EDT  Workstation ID: [id number]    XR Chest 1 View  Result Date: 3/24/2025  Impression: Impression: No acute process.  Electronically Signed: Agus Johnson MD  3/24/2025 12:28 PM EDT  Workstation ID: [id number]      Results for orders placed during the hospital encounter of 03/24/25    Duplex Venous Lower Extremity - Bilateral CAR    Interpretation Summary    Normal bilateral lower extremity venous duplex scan.      Results for orders placed during the hospital encounter of 03/24/25    Duplex Venous Lower Extremity - Bilateral CAR    Interpretation Summary    Normal bilateral lower extremity venous duplex scan.          Labs Pending at Discharge:  Pending Results       None            Procedures Performed           Consults:   Consults       Date and Time Order Name Status Description    3/24/2025  3:35 PM Inpatient Nephrology Consult Completed     3/24/2025  1:01 PM Surgery (on-call MD unless specified) Completed               Discharge Details   Patient is also being discharged on Protonix 40 mg p.o. daily     Discharge Medications        New Medications        Instructions Start Date   cephalexin 500 MG capsule  Commonly known as: KEFLEX   500 mg, Oral, 4 Times Daily      cephalexin 500 MG capsule  Commonly known as: KEFLEX   500 mg, Oral, 4 Times Daily             Continue These Medications        Instructions Start Date   albuterol (2.5 MG/3ML) 0.083% nebulizer solution  Commonly known as: PROVENTIL   2.5 mg, Nebulization, Every 4 Hours PRN      Doxepin HCl 6 MG tablet   6 mg, Oral, Daily      famotidine 20 MG tablet  Commonly known as: PEPCID   20 mg, Oral, 2 Times Daily      fluticasone-salmeterol 45-21 MCG/ACT inhaler  Commonly known as: ADVAIR HFA   2 puffs, Inhalation, 2 Times Daily - RT      Gvoke HypoPen 2-Pack 1 MG/0.2ML solution auto-injector  Generic drug: Glucagon   1 mg, Subcutaneous, Once As Needed      hydroCHLOROthiazide 25 MG tablet   25 mg, Oral, Daily      hydrOXYzine 25 MG tablet  Commonly known as: ATARAX   Take one tablet my mouth twice a day as needed for anxiety      Insulin Glargine 100 UNIT/ML injection  pen  Commonly known as: LANTUS SOLOSTAR   20 Units, Subcutaneous, Nightly      Insulin Lispro 100 UNIT/ML injection pen  Commonly known as: ADMELOG SOLOSTAR   11 Units, Subcutaneous, 3 Times Daily With Meals      metFORMIN 1000 MG tablet  Commonly known as: GLUCOPHAGE   1,000 mg, Oral, Every 12 Hours Scheduled      nebivolol 10 MG tablet  Commonly known as: BYSTOLIC   10 mg, Oral, Daily      QUEtiapine 200 MG tablet  Commonly known as: SEROquel   200 mg, Oral, Every Night at Bedtime      QUEtiapine 25 MG tablet  Commonly known as: SEROquel   25 mg, Oral, Nightly               Allergies   Allergen Reactions    Acetaminophen Unknown - High Severity    Aspirin Hives     Per daughter and mother    Codeine Unknown - High Severity    Pregabalin Unknown - High Severity    Topiramate Unknown - High Severity    Valdecoxib Unknown - High Severity    Tramadol Swelling    Naproxen Sodium Hives    Tylenol With Codeine #3 [Acetaminophen-Codeine] Hives         Discharge Disposition: Home  Home or Self Care    Diet:Cardiac Diabetic Diet          Discharge Activity:   Activity Instructions       Activity as Tolerated                CODE STATUS:  Code Status and Medical Interventions: CPR (Attempt to Resuscitate); Full Support   Ordered at: 03/24/25 1459     Code Status (Patient has no pulse and is not breathing):    CPR (Attempt to Resuscitate)     Medical Interventions (Patient has pulse or is breathing):    Full Support         Future Appointments   Date Time Provider Department Center   4/23/2025  1:15 PM [facility] 2  [facility]   4/23/2025  1:30 PM [facility] LOVE 1  [facility] MAMMO [facility]   4/23/2025  1:45 PM  [facility] PULM LAB ROOM  [facility] PFT [facility]       Additional Instructions for the Follow-ups that You Need to Schedule       Discharge Follow-up with PCP   As directed       Currently Documented PCP:    Barbara Couch APRN    PCP Phone Number:    [phone]     Follow Up Details: 1 week        Discharge Follow-up with Specified Provider:  General surgery; 2 Weeks   As directed      To: General surgery   Follow Up: 2 Weeks        Discharge Follow-up with Specified Provider: Nephrology; 2 Weeks   As directed      To: Nephrology   Follow Up: 2 Weeks                Time spent on Discharge including face to face service:  >30 minutes    Signature: Electronically signed by Sarah Kinsey MD, 03/30/25, 18:50 EDT.  [facility] Hospitalist Team

## 2025-03-30 NOTE — PROGRESS NOTES
Enter Query Response Below      Query Response: Left lower extremity cellulitis not due to type 2 diabetes mellitus              If applicable, please update the problem list.

## 2025-03-31 ENCOUNTER — TRANSITIONAL CARE MANAGEMENT TELEPHONE ENCOUNTER (OUTPATIENT)
Dept: CALL CENTER | Facility: HOSPITAL | Age: 55
End: 2025-03-31
Payer: MEDICAID

## 2025-03-31 NOTE — OUTREACH NOTE
Call Center TCM Note      Flowsheet Row Responses   [facility], [place] patient discharged from? [facility]   Does the patient have one of the following disease processes/diagnoses(primary or secondary)? Other   TCM attempt successful? Yes   Call start time 1248   Call end time 1252   Discharge diagnosis Altered mental status   Person spoke with today (if not patient) and relationship Patient   Medication alerts for this patient Keflex   Meds reviewed with patient/caregiver? Yes   Is the patient having any side effects they believe may be caused by any medication additions or changes? No   Does the patient have all medications ordered at discharge? Yes   Is the patient taking all medications as directed (includes completed medication regime)? Yes   Comments Patient declined scheduling a PCP HOSPITAL f/u appt at time of call, she states she prefers to call the office herself.   Does the patient have an appointment with their PCP within 7-14 days of discharge? No   Nursing Interventions Patient declined scheduling/rescheduling appointment at this time   Has home health visited the patient within 72 hours of discharge? N/A   Psychosocial issues? No   Did the patient receive a copy of their discharge instructions? Yes   Nursing interventions Reviewed instructions with patient   What is the patient's perception of their health status since discharge? Improving   Is the patient/caregiver able to teach back signs and symptoms related to disease process for when to call PCP? Yes   Is the patient/caregiver able to teach back signs and symptoms related to disease process for when to call 911? Yes   Is the patient/caregiver able to teach back the hierarchy of who to call/visit for symptoms/problems? PCP, Specialist, Home health nurse, Urgent Care, ED, 911 Yes   TCM call completed? Yes   Wrap up additional comments Patient states cellulitis in left leg is improving. She states she is still tired, educated on recovering from Sepsis.  Patient declined scheduling a [facility] HOSPITAL f/u appt at time of call, she states she prefers to call the office herself.   Call end time 1252   Would this patient benefit from a Referral to [facility] Social Work? No   Is the patient interested in additional calls from an ambulatory ? No            Shante SPICER - Registered Nurse    3/31/2025, 12:54 EDT            Shante SPICER - Registered Nurse

## 2025-04-01 NOTE — PROGRESS NOTES
Enter Query Response Below      Query Response: severe sepsis causing acute organ failure, acute kidney injury, lactic acidosis              If applicable, please update the problem list.

## 2025-04-01 NOTE — PROGRESS NOTES
Enter Query Response Below      Query Response: Metabolic encephalopathy              If applicable, please update the problem list.

## 2025-04-03 RX ORDER — DOXEPIN 6 MG/1
1 TABLET, FILM COATED ORAL DAILY
Qty: 30 TABLET | Refills: 0 | Status: SHIPPED | OUTPATIENT
Start: 2025-04-03

## 2025-04-04 ENCOUNTER — READMISSION MANAGEMENT (OUTPATIENT)
Dept: CALL CENTER | Facility: HOSPITAL | Age: 55
End: 2025-04-04
Payer: MEDICAID

## 2025-04-04 NOTE — OUTREACH NOTE
Medical Week 2 Survey      Flowsheet Row Responses   [facility] facility patient discharged from? [facility]   Does the patient have one of the following disease processes/diagnoses(primary or secondary)? Other   Week 2 attempt successful? No   Unsuccessful attempts Attempt 1   Revoke Other  [ACM program]            Francesca COLBERT - Registered Nurse

## 2025-04-12 DIAGNOSIS — K21.9 GERD WITHOUT ESOPHAGITIS: Chronic | ICD-10-CM

## 2025-04-14 RX ORDER — FAMOTIDINE 20 MG/1
20 TABLET, FILM COATED ORAL 2 TIMES DAILY
Qty: 60 TABLET | Refills: 1 | Status: SHIPPED | OUTPATIENT
Start: 2025-04-14

## 2025-05-02 DIAGNOSIS — E11.65 TYPE 2 DIABETES MELLITUS WITH HYPERGLYCEMIA, WITH LONG-TERM CURRENT USE OF INSULIN: ICD-10-CM

## 2025-05-02 DIAGNOSIS — Z79.4 TYPE 2 DIABETES MELLITUS WITH HYPERGLYCEMIA, WITH LONG-TERM CURRENT USE OF INSULIN: ICD-10-CM

## 2025-05-02 RX ORDER — INSULIN GLARGINE 100 [IU]/ML
INJECTION, SOLUTION SUBCUTANEOUS
Qty: 15 ML | Refills: 1 | Status: SHIPPED | OUTPATIENT
Start: 2025-05-02

## 2025-05-02 NOTE — TELEPHONE ENCOUNTER
Rx Refill Note  Requested Prescriptions     Pending Prescriptions Disp Refills    Lantus SoloStar 100 UNIT/ML injection pen [Pharmacy Med Name: LANTUS SOLOSTAR 100 UNIT/ML]  1     Sig: INJECT 20 UNITS UNDER THE SKIN INTO THE APPROPRIATE AREA AS DIRECTED EVERY NIGHT.      Last office visit with prescribing clinician: 2/14/2025   Last telemedicine visit with prescribing clinician: Visit date not found   Next office visit with prescribing clinician: 5/12/2025                         Would you like a call back once the refill request has been completed: [] Yes [] No    If the office needs to give you a call back, can they leave a voicemail: [] Yes [] No    Julianne Griffith MA  05/02/25, 13:57 EDT

## 2025-05-09 DIAGNOSIS — R56.9 SEIZURES: ICD-10-CM

## 2025-05-09 DIAGNOSIS — F31.9 BIPOLAR 1 DISORDER: ICD-10-CM

## 2025-05-09 RX ORDER — QUETIAPINE FUMARATE 25 MG/1
25 TABLET, FILM COATED ORAL NIGHTLY
Qty: 30 TABLET | Refills: 2 | Status: SHIPPED | OUTPATIENT
Start: 2025-05-09

## 2025-05-09 RX ORDER — HYDROXYZINE HYDROCHLORIDE 25 MG/1
TABLET, FILM COATED ORAL
Qty: 60 TABLET | Refills: 2 | Status: SHIPPED | OUTPATIENT
Start: 2025-05-09

## 2025-05-09 NOTE — TELEPHONE ENCOUNTER
Rx Refill Note  Requested Prescriptions     Pending Prescriptions Disp Refills    hydrOXYzine (ATARAX) 25 MG tablet [Pharmacy Med Name: HYDROXYZINE HCL 25 MG TABLET] 60 tablet 2     Sig: TAKE ONE TABLET MY MOUTH TWICE A DAY AS NEEDED FOR ANXIETY INDICATIONS: FEELING ANXIOUS    QUEtiapine (SEROquel) 25 MG tablet [Pharmacy Med Name: QUETIAPINE FUMARATE 25 MG TAB] 30 tablet 2     Sig: TAKE 1 TABLET BY MOUTH EVERY NIGHT. INDICATIONS: MAJOR DEPRESSIVE DISORDER      Last office visit with prescribing clinician: 2/14/2025   Last telemedicine visit with prescribing clinician: Visit date not found   Next office visit with prescribing clinician: 5/12/2025                         Would you like a call back once the refill request has been completed: [] Yes [] No    If the office needs to give you a call back, can they leave a voicemail: [] Yes [] No    Julianne Griffith MA  05/09/25, 08:53 EDT

## 2025-05-12 ENCOUNTER — LAB (OUTPATIENT)
Dept: FAMILY MEDICINE CLINIC | Facility: CLINIC | Age: 55
End: 2025-05-12
Payer: MEDICAID

## 2025-05-12 ENCOUNTER — OFFICE VISIT (OUTPATIENT)
Dept: FAMILY MEDICINE CLINIC | Facility: CLINIC | Age: 55
End: 2025-05-12
Payer: MEDICAID

## 2025-05-12 VITALS
OXYGEN SATURATION: 99 % | HEIGHT: 65 IN | BODY MASS INDEX: 47.22 KG/M2 | DIASTOLIC BLOOD PRESSURE: 83 MMHG | TEMPERATURE: 96.2 F | SYSTOLIC BLOOD PRESSURE: 115 MMHG | HEART RATE: 73 BPM | WEIGHT: 283.4 LBS | RESPIRATION RATE: 18 BRPM

## 2025-05-12 DIAGNOSIS — E11.65 TYPE 2 DIABETES MELLITUS WITH HYPERGLYCEMIA, WITH LONG-TERM CURRENT USE OF INSULIN: ICD-10-CM

## 2025-05-12 DIAGNOSIS — Z00.00 HEALTHCARE MAINTENANCE: ICD-10-CM

## 2025-05-12 DIAGNOSIS — L03.116 CELLULITIS OF LEFT LOWER LEG: ICD-10-CM

## 2025-05-12 DIAGNOSIS — Z79.4 TYPE 2 DIABETES MELLITUS WITH HYPERGLYCEMIA, WITH LONG-TERM CURRENT USE OF INSULIN: ICD-10-CM

## 2025-05-12 DIAGNOSIS — F31.9 BIPOLAR 1 DISORDER: Primary | ICD-10-CM

## 2025-05-12 PROCEDURE — 1159F MED LIST DOCD IN RCRD: CPT | Performed by: NURSE PRACTITIONER

## 2025-05-12 PROCEDURE — 80053 COMPREHEN METABOLIC PANEL: CPT | Performed by: NURSE PRACTITIONER

## 2025-05-12 PROCEDURE — 85027 COMPLETE CBC AUTOMATED: CPT | Performed by: NURSE PRACTITIONER

## 2025-05-12 PROCEDURE — 99214 OFFICE O/P EST MOD 30 MIN: CPT | Performed by: NURSE PRACTITIONER

## 2025-05-12 PROCEDURE — 3079F DIAST BP 80-89 MM HG: CPT | Performed by: NURSE PRACTITIONER

## 2025-05-12 PROCEDURE — 80061 LIPID PANEL: CPT | Performed by: NURSE PRACTITIONER

## 2025-05-12 PROCEDURE — 83036 HEMOGLOBIN GLYCOSYLATED A1C: CPT | Performed by: NURSE PRACTITIONER

## 2025-05-12 PROCEDURE — 3074F SYST BP LT 130 MM HG: CPT | Performed by: NURSE PRACTITIONER

## 2025-05-12 PROCEDURE — 3052F HG A1C>EQUAL 8.0%<EQUAL 9.0%: CPT | Performed by: NURSE PRACTITIONER

## 2025-05-12 PROCEDURE — 1160F RVW MEDS BY RX/DR IN RCRD: CPT | Performed by: NURSE PRACTITIONER

## 2025-05-12 PROCEDURE — 36415 COLL VENOUS BLD VENIPUNCTURE: CPT

## 2025-05-12 PROCEDURE — 1125F AMNT PAIN NOTED PAIN PRSNT: CPT | Performed by: NURSE PRACTITIONER

## 2025-05-12 PROCEDURE — 85007 BL SMEAR W/DIFF WBC COUNT: CPT | Performed by: NURSE PRACTITIONER

## 2025-05-12 PROCEDURE — 99396 PREV VISIT EST AGE 40-64: CPT | Performed by: NURSE PRACTITIONER

## 2025-05-12 RX ORDER — PANTOPRAZOLE SODIUM 40 MG/1
40 TABLET, DELAYED RELEASE ORAL DAILY
COMMUNITY

## 2025-05-12 RX ORDER — MUPIROCIN CALCIUM 20 MG/G
1 CREAM TOPICAL 3 TIMES DAILY
Qty: 21 G | Refills: 0 | Status: SHIPPED | OUTPATIENT
Start: 2025-05-12 | End: 2025-05-19

## 2025-05-12 NOTE — PROGRESS NOTES
Transitional Care Follow Up Visit  Subjective     Serina Powell is a 55 y.o. female who presents for a transitional care management visit.    Within 48 business hours after discharge our office contacted her via telephone to coordinate her care and needs.      I reviewed and discussed the details of that call along with the discharge summary, hospital problems, inpatient lab results, inpatient diagnostic studies, and consultation reports with Serina.     Current outpatient and discharge medications have been reconciled for the patient.  Reviewed by: KAMALA Maciel          3/28/2025     4:01 PM   Date of TCM Phone Call   [facility]   Date of Admission 3/24/2025   Date of Discharge 3/28/2025     Risk for Readmission (LACE) No data recorded    History of Present Illness     Date of Service: 3/28/2025  Patient Name: Serina Powell  : 1970  MRN: [id number]     Date of Admission: 3/24/2025  Discharge Diagnosis:      Left lower extremity cellulitis  Sepsis  AMS with agitation  Cholelithiasis with gallbladder wall distention  Esophagitis  RSV infection  WILLIAM  Diabetes mellitus type 2  Hypertension     Date of Discharge:3/28/2025    Course During Hospital Stay:  Hospital Course:     Patient is a 54-year-old lady who was admitted and treated for left lower extremity cellulitis.  She was started on IV ceftriaxone and vancomycin.  Blood cultures came back showing no growth.  MRSA PCR was negative.  Vancomycin was discontinued and ceftriaxone continued.  She had recurrent fevers which eventually resolved.  Cellulitis started to resolve in the left lower extremities.  She gradually improved.  She was followed by nephrology for acute kidney injury and was treated with IV fluids.  Renal function gradually improved.  Incidentally respiratory panel came back positive for RSV infection.  Supportive care was observed.  Droplet isolation was instituted.  Oxygen saturations remained in the high 90s  "on room air.     Patient had some right upper quadrant abdominal pain and CT scan showed cholelithiasis with gallbladder distention.  Ultrasound was done to evaluate for possible cholecystitis but the result was equivocal.  General surgery saw the patient and ordered a HIDA scan which ruled out acute cholecystitis.  General surgery signed off.  Of note is that CT scan of the abdomen had also shown findings suggestive of esophagitis and patient was started on Protonix.     The patient has improved overall and is being discharged home on Keflex for complete treatment of left lower extremity cellulitis.     The following portions of the patient's history were reviewed and updated as appropriate: allergies, current medications, past family history, past medical history, past social history, past surgical history, and problem list.    Review of Systems    Objective   /83 (BP Location: Left arm, Patient Position: Sitting, Cuff Size: Adult)   Pulse 73   Temp 96.2 °F (35.7 °C) (Temporal)   Resp 18   Ht 165.1 cm (65\")   Wt 129 kg (283 lb 6.4 oz)   SpO2 99%   BMI 47.16 kg/m²   Physical Exam    Assessment & Plan   Problems Addressed this Visit          Endocrine and Metabolic    Diabetes mellitus (Chronic)    Relevant Orders    POCT Glucose    Ambulatory Referral to Nephrology    Ambulatory Referral to Endocrinology    Urinalysis With Culture If Indicated - Urine, Clean Catch       Health Encounters    Healthcare maintenance    Relevant Orders    Urine Drug Screen - Urine, Clean Catch    CBC w MANUAL Differential    Comprehensive metabolic panel    Hemoglobin A1c    Lipid panel    Ambulatory Referral to Social Care Services (Amb Case Mgmt)    Urinalysis With Culture If Indicated - Urine, Clean Catch       Infectious Diseases    Cellulitis of left lower leg    Admission at [facility] 3/24/25 - 3/28/25, for sepsis from LLE cellulitis.         Relevant Medications    mupirocin (BACTROBAN) 2 % cream       Mental Health    " Bipolar 1 disorder - Primary    Relevant Orders    Ambulatory Referral to Behavioral Health    Urine Drug Screen - Urine, Clean Catch     Diagnoses         Codes Comments      Bipolar 1 disorder    -  Primary ICD-10-CM: F31.9  ICD-9-CM: 296.7       Cellulitis of left lower leg     ICD-10-CM: L03.116  ICD-9-CM: 682.6       Type 2 diabetes mellitus with hyperglycemia, with long-term current use of insulin     ICD-10-CM: E11.65, Z79.4  ICD-9-CM: 250.00, 790.29, V58.67       Healthcare maintenance     ICD-10-CM: Z00.00  ICD-9-CM: V70.0           Diagnoses and all orders for this visit:    1. Bipolar 1 disorder (Primary)  -     Ambulatory Referral to Behavioral Health  -     Urine Drug Screen - Urine, Clean Catch; Future    2. Cellulitis of left lower leg  Assessment & Plan:  Admission at [facility] 3/24/25 - 3/28/25, for sepsis from LLE cellulitis.    Orders:  -     mupirocin (BACTROBAN) 2 % cream; Apply 1 Application topically to the appropriate area as directed 3 (Three) Times a Day for 7 days.  Dispense: 21 g; Refill: 0    3. Type 2 diabetes mellitus with hyperglycemia, with long-term current use of insulin  -     POCT Glucose  -     Ambulatory Referral to Nephrology  -     Ambulatory Referral to Endocrinology  -     Urinalysis With Culture If Indicated - Urine, Clean Catch; Future    4. Healthcare maintenance  -     Urine Drug Screen - Urine, Clean Catch; Future  -     CBC w MANUAL Differential; Future  -     Comprehensive metabolic panel; Future  -     Hemoglobin A1c; Future  -     Lipid panel; Future  -     Ambulatory Referral to Social Care Services (Amb Case Mgmt)  -     Urinalysis With Culture If Indicated - Urine, Clean Catch; Future            Current Outpatient Medications on File Prior to Visit   Medication Sig Dispense Refill    albuterol (PROVENTIL) (2.5 MG/3ML) 0.083% nebulizer solution Take 2.5 mg by nebulization Every 4 (Four) Hours As Needed for Wheezing for up to 90 days. Indications: Spasm of Lung Air  Passages 20 each 0    Doxepin HCl 6 MG tablet TAKE 1 TABLET BY MOUTH EVERY DAY 30 tablet 0    famotidine (PEPCID) 20 MG tablet TAKE 1 TABLET BY MOUTH TWICE A DAY 60 tablet 1    fluticasone-salmeterol (ADVAIR HFA) 45-21 MCG/ACT inhaler Inhale 2 puffs 2 (Two) Times a Day. Indications: Asthma 12 g 0    Glucagon (Gvoke HypoPen 2-Pack) 1 MG/0.2ML solution auto-injector Inject 1 mg under the skin into the appropriate area as directed 1 (One) Time As Needed (hypoglycemia) for up to 1 dose. 0.4 mL 1    hydroCHLOROthiazide 25 MG tablet Take 1 tablet by mouth Daily for 180 days. Indications: Edema 180 tablet 0    hydrOXYzine (ATARAX) 25 MG tablet TAKE ONE TABLET MY MOUTH TWICE A DAY AS NEEDED FOR ANXIETY INDICATIONS: FEELING ANXIOUS 60 tablet 2    Insulin Lispro (ADMELOG SOLOSTAR) 100 UNIT/ML injection pen Inject 11 Units under the skin into the appropriate area as directed 3 (Three) Times a Day With Meals. Indications: Type 2 Diabetes 15 mL 1    Lantus SoloStar 100 UNIT/ML injection pen INJECT 20 UNITS UNDER THE SKIN INTO THE APPROPRIATE AREA AS DIRECTED EVERY NIGHT. 15 mL 1    metFORMIN (GLUCOPHAGE) 1000 MG tablet Take 1 tablet by mouth Every 12 (Twelve) Hours. Indications: Type 2 Diabetes 180 tablet 1    nebivolol (BYSTOLIC) 10 MG tablet Take 1 tablet by mouth Daily. Indications: High Blood Pressure 90 tablet 1    pantoprazole (PROTONIX) 40 MG EC tablet Take 1 tablet by mouth Daily.      QUEtiapine (SEROquel) 200 MG tablet Take 1 tablet by mouth every night at bedtime. 30 tablet 3    QUEtiapine (SEROquel) 25 MG tablet TAKE 1 TABLET BY MOUTH EVERY NIGHT. INDICATIONS: MAJOR DEPRESSIVE DISORDER 30 tablet 2     No current facility-administered medications on file prior to visit.

## 2025-05-12 NOTE — PATIENT INSTRUCTIONS
Date of Service: 3/28/2025  Patient Name: Serina Powell  : 1970  MRN: [id number]     Date of Admission: 3/24/2025  Discharge Diagnosis:      Left lower extremity cellulitis  Sepsis  AMS with agitation  Cholelithiasis with gallbladder wall distention  Esophagitis  RSV infection  WILLIAM  Diabetes mellitus type 2  Hypertension     Date of Discharge:3/28/2025

## 2025-05-13 ENCOUNTER — REFERRAL TRIAGE (OUTPATIENT)
Age: 55
End: 2025-05-13
Payer: MEDICAID

## 2025-05-13 LAB
ALBUMIN SERPL-MCNC: 3.8 G/DL (ref 3.5–5.2)
ALBUMIN/GLOB SERPL: 1 G/DL
ALP SERPL-CCNC: 81 U/L (ref 39–117)
ALT SERPL W P-5'-P-CCNC: 8 U/L (ref 1–33)
ANION GAP SERPL CALCULATED.3IONS-SCNC: 14.1 MMOL/L (ref 5–15)
AST SERPL-CCNC: 16 U/L (ref 1–32)
BASOPHILS # BLD MANUAL: 0.1 10*3/MM3 (ref 0–0.2)
BASOPHILS NFR BLD MANUAL: 1 % (ref 0–1.5)
BILIRUB SERPL-MCNC: <0.2 MG/DL (ref 0–1.2)
BUN SERPL-MCNC: 29 MG/DL (ref 6–20)
BUN/CREAT SERPL: 21.3 (ref 7–25)
CALCIUM SPEC-SCNC: 9.3 MG/DL (ref 8.6–10.5)
CHLORIDE SERPL-SCNC: 98 MMOL/L (ref 98–107)
CHOLEST SERPL-MCNC: 217 MG/DL (ref 0–200)
CO2 SERPL-SCNC: 19.9 MMOL/L (ref 22–29)
CREAT SERPL-MCNC: 1.36 MG/DL (ref 0.57–1)
DEPRECATED RDW RBC AUTO: 37.7 FL (ref 37–54)
EGFRCR SERPLBLD CKD-EPI 2021: 45.5 ML/MIN/1.73
EOSINOPHIL # BLD MANUAL: 0.83 10*3/MM3 (ref 0–0.4)
EOSINOPHIL NFR BLD MANUAL: 8 % (ref 0.3–6.2)
ERYTHROCYTE [DISTWIDTH] IN BLOOD BY AUTOMATED COUNT: 12 % (ref 12.3–15.4)
GLOBULIN UR ELPH-MCNC: 3.7 GM/DL
GLUCOSE SERPL-MCNC: 201 MG/DL (ref 65–99)
HBA1C MFR BLD: 8.8 % (ref 4.8–5.6)
HCT VFR BLD AUTO: 38 % (ref 34–46.6)
HDLC SERPL-MCNC: 33 MG/DL (ref 40–60)
HGB BLD-MCNC: 12.5 G/DL (ref 12–15.9)
LDLC SERPL CALC-MCNC: 136 MG/DL (ref 0–100)
LDLC/HDLC SERPL: 3.97 {RATIO}
LYMPHOCYTES # BLD MANUAL: 3.94 10*3/MM3 (ref 0.7–3.1)
LYMPHOCYTES NFR BLD MANUAL: 5 % (ref 5–12)
MCH RBC QN AUTO: 28.3 PG (ref 26.6–33)
MCHC RBC AUTO-ENTMCNC: 32.9 G/DL (ref 31.5–35.7)
MCV RBC AUTO: 86.2 FL (ref 79–97)
MONOCYTES # BLD: 0.52 10*3/MM3 (ref 0.1–0.9)
NEUTROPHILS # BLD AUTO: 4.98 10*3/MM3 (ref 1.7–7)
NEUTROPHILS NFR BLD MANUAL: 48 % (ref 42.7–76)
PLAT MORPH BLD: NORMAL
PLATELET # BLD AUTO: 245 10*3/MM3 (ref 140–450)
PMV BLD AUTO: 11.4 FL (ref 6–12)
POIKILOCYTOSIS BLD QL SMEAR: ABNORMAL
POLYCHROMASIA BLD QL SMEAR: ABNORMAL
POTASSIUM SERPL-SCNC: 3.8 MMOL/L (ref 3.5–5.2)
PROT SERPL-MCNC: 7.5 G/DL (ref 6–8.5)
RBC # BLD AUTO: 4.41 10*6/MM3 (ref 3.77–5.28)
SODIUM SERPL-SCNC: 132 MMOL/L (ref 136–145)
TRIGL SERPL-MCNC: 265 MG/DL (ref 0–150)
VARIANT LYMPHS NFR BLD MANUAL: 38 % (ref 19.6–45.3)
VLDLC SERPL-MCNC: 48 MG/DL (ref 5–40)
WBC MORPH BLD: NORMAL
WBC NRBC COR # BLD AUTO: 10.37 10*3/MM3 (ref 3.4–10.8)

## 2025-05-15 ENCOUNTER — PATIENT OUTREACH (OUTPATIENT)
Age: 55
End: 2025-05-15
Payer: MEDICAID

## 2025-05-15 NOTE — OUTREACH NOTE
SW received referral via PCP re: compliance concerns. SW attempted to reach patient. No Answer. LVM. Next outreach scheduled x 2 days. PCP notified.     Alesia PAUL -   Ambulatory Case Management    5/15/2025, 13:04 EDT

## 2025-05-21 ENCOUNTER — TELEPHONE (OUTPATIENT)
Dept: FAMILY MEDICINE CLINIC | Facility: CLINIC | Age: 55
End: 2025-05-21
Payer: MEDICAID

## 2025-05-21 ENCOUNTER — PATIENT OUTREACH (OUTPATIENT)
Age: 55
End: 2025-05-21
Payer: MEDICAID

## 2025-05-21 NOTE — TELEPHONE ENCOUNTER
Caller: Serina Powell    Relationship: Self    Best call back number:     [phone] (Mobile)       What medication are you requesting:    REPLACEMENT FOR  mupirocin  / NOT COVERED BY INSURANCE     AND     PANTOPRAZOLE 40MG       Have you had these symptoms before:    [x] Yes  [] No    Have you been treated for these symptoms before:   [x] Yes  [] No    If a prescription is needed, what is your preferred pharmacy and phone number: [id number] [place] [address] [phone] [facility] [phone]      Additional notes:

## 2025-05-21 NOTE — OUTREACH NOTE
Social Work Assessment  Questions/Answers      Flowsheet Row Most Recent Value   Referral Source outpatient staff, outpatient clinic, physician   Reason for Consult community resources, care coordination/care conference   Preferred Language English   Advance Care Planning Reviewed no concerns identified   People in Home child(satnam), adult, grandchild(satnam)   Current Living Arrangements home   Potentially Unsafe Housing Conditions none   In the past 12 months has the electric, gas, oil, or water company threatened to shut off services in your home? No   Primary Care Provided by self, child(satnam)   Provides Primary Care For no one   Family Caregiver if Needed child(satnam), adult   Quality of Family Relationships helpful, involved   Able to Return to Prior Arrangements yes   Employment Status disabled   Source of Income disability, social security   Financial/Environmental Concerns none   Application for Public Assistance not applied   Medications independent   Meal Preparation independent   Housekeeping independent   Laundry independent   Shopping assistive person  [daughter drives]   If for any reason you need help with day-to-day activities such as bathing, preparing meals, shopping, managing finances, etc., do you get the help you need? I get all the help I need          SDOH updated and reviewed with the patient during this program:  Financial Resource Strain: Low Risk  (5/21/2025)    Overall Financial Resource Strain (CARDIA)     Difficulty of Paying Living Expenses: Not very hard   Recent Concern: Financial Resource Strain - Medium Risk (2/24/2025)    Overall Financial Resource Strain (CARDIA)     Difficulty of Paying Living Expenses: Somewhat hard      --     Food Insecurity: No Food Insecurity (5/21/2025)    Hunger Vital Sign     Worried About Running Out of Food in the Last Year: Never true     Ran Out of Food in the Last Year: Never true      --     Housing Stability: Unknown (5/21/2025)    Housing Stability  Vital Sign     Unable to Pay for Housing in the Last Year: No     Homeless in the Last Year: No      --     Transportation Needs: No Transportation Needs (5/21/2025)    PRAPARE - Transportation     Lack of Transportation (Medical): No     Lack of Transportation (Non-Medical): No         Patient Outreach    SW received referral via PCP re: compliance concerns. SW called and spoke to patient on third attempt. Patient lives with daughter and grandchildren. Daughter provides transportation, as needed. Patient receives SSDI and denies any financial concerns or food insecurity. Sw discussed concerns with missed appts and not attending follow up or specialist appts. Patient reports she called and scheduled nephrology appt and plans to [facility] health [facility]. PCP notified. No additional needs noted. This SW to continue to monitor x 30 days to ensure no additional needs arise prior to DC.    Alesia PAUL -   Ambulatory Case Management    5/21/2025, 11:42 EDT

## 2025-05-23 NOTE — TELEPHONE ENCOUNTER
Hub staff attempted to follow warm transfer process and was unsuccessful     Caller: Serina Powell    Relationship to patient: Self    Best call back number: [phone]    Patient is needing: PLEASE REACH OUT TO GET THIS TAKEN CARE OF.

## 2025-05-27 DIAGNOSIS — L03.116 CELLULITIS OF LEFT LOWER LEG: Primary | ICD-10-CM

## 2025-05-27 RX ORDER — MUPIROCIN 20 MG/G
1 OINTMENT TOPICAL 2 TIMES DAILY
Qty: 28 G | Refills: 0 | Status: SHIPPED | OUTPATIENT
Start: 2025-05-27 | End: 2025-06-10

## 2025-06-07 DIAGNOSIS — R56.9 SEIZURES: ICD-10-CM

## 2025-06-07 DIAGNOSIS — F31.9 BIPOLAR 1 DISORDER: ICD-10-CM

## 2025-06-09 RX ORDER — QUETIAPINE FUMARATE 200 MG/1
200 TABLET, FILM COATED ORAL
Qty: 30 TABLET | Refills: 3 | Status: SHIPPED | OUTPATIENT
Start: 2025-06-09

## 2025-06-09 NOTE — TELEPHONE ENCOUNTER
Rx Refill Note  Requested Prescriptions     Pending Prescriptions Disp Refills    QUEtiapine (SEROquel) 200 MG tablet [Pharmacy Med Name: QUETIAPINE FUMARATE 200 MG TAB] 30 tablet 3     Sig: TAKE 1 TABLET BY MOUTH EVERYDAY AT BEDTIME      Last office visit with prescribing clinician: 5/12/2025   Last telemedicine visit with prescribing clinician: Visit date not found   Next office visit with prescribing clinician: 8/11/2025                         Would you like a call back once the refill request has been completed: [] Yes [] No    If the office needs to give you a call back, can they leave a voicemail: [] Yes [] No    Julianne Griffith MA  06/09/25, 12:59 EDT

## 2025-06-18 DIAGNOSIS — K21.9 GERD WITHOUT ESOPHAGITIS: Chronic | ICD-10-CM

## 2025-06-18 RX ORDER — FAMOTIDINE 20 MG/1
20 TABLET, FILM COATED ORAL 2 TIMES DAILY
Qty: 60 TABLET | Refills: 1 | Status: SHIPPED | OUTPATIENT
Start: 2025-06-18

## 2025-06-20 ENCOUNTER — PATIENT OUTREACH (OUTPATIENT)
Age: 55
End: 2025-06-20
Payer: MEDICAID

## 2025-06-20 NOTE — OUTREACH NOTE
Care Coordination    Per piotr review, no additional needs noted in the past 30 days. SW to discharge. Please re consult SW if additional needs arise.       Alesia PAUL -   Ambulatory Case Management    6/20/2025, 15:34 EDT

## 2025-06-30 ENCOUNTER — PATIENT OUTREACH (OUTPATIENT)
Dept: CASE MANAGEMENT | Facility: OTHER | Age: 55
End: 2025-06-30
Payer: MEDICAID

## 2025-06-30 NOTE — OUTREACH NOTE
AMBULATORY CASE MANAGEMENT NOTE    Names and Relationships of Patient/Support Persons:  -     Care Coordination    Multiple (x6) follow up RN-ACM outreach attempts to patient unsuccessful. HRCM program closed. RN-ACM available for assistance as needed.       Nica NARAYANAN  Ambulatory Case Management    6/30/2025, 14:51 EDT

## 2025-07-05 DIAGNOSIS — E11.65 TYPE 2 DIABETES MELLITUS WITH HYPERGLYCEMIA, WITH LONG-TERM CURRENT USE OF INSULIN: ICD-10-CM

## 2025-07-05 DIAGNOSIS — Z79.4 TYPE 2 DIABETES MELLITUS WITH HYPERGLYCEMIA, WITH LONG-TERM CURRENT USE OF INSULIN: ICD-10-CM

## 2025-07-07 RX ORDER — INSULIN GLARGINE 100 [IU]/ML
20 INJECTION, SOLUTION SUBCUTANEOUS NIGHTLY
Qty: 18 ML | Refills: 0 | Status: SHIPPED | OUTPATIENT
Start: 2025-07-07 | End: 2025-10-05

## 2025-07-07 NOTE — TELEPHONE ENCOUNTER
Rx Refill Note  Requested Prescriptions     Pending Prescriptions Disp Refills    Lantus SoloStar 100 UNIT/ML injection pen [Pharmacy Med Name: LANTUS SOLOSTAR 100 UNIT/ML]  1     Sig: INJECT 20 UNITS UNDER THE SKIN INTO THE APPROPRIATE AREA AS DIRECTED EVERY NIGHT.      Last office visit with prescribing clinician: Visit date not found   Last telemedicine visit with prescribing clinician: Visit date not found   Next office visit with prescribing clinician: Visit date not found                         Would you like a call back once the refill request has been completed: [] Yes [] No    If the office needs to give you a call back, can they leave a voicemail: [] Yes [] No    Julianne Griffith MA  07/07/25, 11:50 EDT

## 2025-08-02 DIAGNOSIS — R56.9 SEIZURES: ICD-10-CM

## 2025-08-02 DIAGNOSIS — F31.9 BIPOLAR 1 DISORDER: ICD-10-CM

## 2025-08-05 RX ORDER — HYDROXYZINE HYDROCHLORIDE 25 MG/1
TABLET, FILM COATED ORAL
Qty: 60 TABLET | Refills: 2 | Status: SHIPPED | OUTPATIENT
Start: 2025-08-05

## 2025-08-05 RX ORDER — QUETIAPINE FUMARATE 25 MG/1
25 TABLET, FILM COATED ORAL NIGHTLY
Qty: 30 TABLET | Refills: 2 | Status: SHIPPED | OUTPATIENT
Start: 2025-08-05

## 2025-08-13 DIAGNOSIS — K21.9 GERD WITHOUT ESOPHAGITIS: Chronic | ICD-10-CM

## 2025-08-17 DIAGNOSIS — I10 ESSENTIAL HYPERTENSION: Chronic | ICD-10-CM

## 2025-08-17 DIAGNOSIS — E11.65 TYPE 2 DIABETES MELLITUS WITH HYPERGLYCEMIA, WITH LONG-TERM CURRENT USE OF INSULIN: ICD-10-CM

## 2025-08-17 DIAGNOSIS — Z79.4 TYPE 2 DIABETES MELLITUS WITH HYPERGLYCEMIA, WITH LONG-TERM CURRENT USE OF INSULIN: ICD-10-CM

## 2025-08-19 RX ORDER — FAMOTIDINE 20 MG/1
20 TABLET, FILM COATED ORAL 2 TIMES DAILY
Qty: 60 TABLET | Refills: 1 | Status: SHIPPED | OUTPATIENT
Start: 2025-08-19

## 2025-08-20 RX ORDER — HYDROCHLOROTHIAZIDE 25 MG/1
25 TABLET ORAL DAILY
Qty: 90 TABLET | Refills: 1 | Status: SHIPPED | OUTPATIENT
Start: 2025-08-20 | End: 2026-02-16

## 2025-08-20 RX ORDER — NEBIVOLOL 10 MG/1
10 TABLET ORAL DAILY
Qty: 90 TABLET | Refills: 1 | Status: SHIPPED | OUTPATIENT
Start: 2025-08-20

## 2025-08-28 ENCOUNTER — TRANSCRIBE ORDERS (OUTPATIENT)
Dept: ADMINISTRATIVE | Facility: HOSPITAL | Age: 55
End: 2025-08-28
Payer: MEDICAID

## 2025-08-28 DIAGNOSIS — R06.00 DYSPNEA, UNSPECIFIED TYPE: ICD-10-CM

## 2025-08-28 DIAGNOSIS — Z87.891 PERSONAL HISTORY OF TOBACCO USE, PRESENTING HAZARDS TO HEALTH: ICD-10-CM

## 2025-08-28 DIAGNOSIS — Z12.2 SCREENING FOR LUNG CANCER: Primary | ICD-10-CM

## (undated) DEVICE — APPL CHLORAPREP HI/LITE TINTED 10.5ML ORNG

## (undated) DEVICE — KT SURG TURNOVER 050

## (undated) DEVICE — GOWN,REINFORCE,POLY,SIRUS,BREATH SLV,XLG: Brand: MEDLINE

## (undated) DEVICE — GOWN,REINFRCE,POLY,SIRUS,BREATH SLV,XXLG: Brand: MEDLINE

## (undated) DEVICE — SOL IRRIG H2O 1000ML STRL

## (undated) DEVICE — COVER,TABLE,44X90,STERILE: Brand: MEDLINE

## (undated) DEVICE — PK EXTREM 50

## (undated) DEVICE — STAPLER, SKIN, 35W, A: Brand: MEDLINE INDUSTRIES, INC.

## (undated) DEVICE — DRILL BIT LOCKING, MEDIUM: Brand: AXSOS

## (undated) DEVICE — GLV SURG DERMASSURE GRN LF PF 8.5

## (undated) DEVICE — SPNG LAP PREWSH SFTPK 18X18IN STRL PK/5

## (undated) DEVICE — SUT VIC 3/0 FS1 27IN J442H

## (undated) DEVICE — 3M™ STERI-DRAPE™ U-DRAPE 1015: Brand: STERI-DRAPE™

## (undated) DEVICE — SOL IRRIG NACL 1000ML

## (undated) DEVICE — NDL SUT MARTIN 1/2 CIR NO7 18607DG PK/2

## (undated) DEVICE — PAD,ABDOMINAL,5"X9",STERILE,LF,1/PK: Brand: MEDLINE INDUSTRIES, INC.

## (undated) DEVICE — GLV SURG SIGNATURE ESSENTIAL PF LTX SZ8.5

## (undated) DEVICE — KIRSCHNER WIRE
Type: IMPLANTABLE DEVICE | Site: HUMERUS | Status: NON-FUNCTIONAL
Removed: 2021-02-10

## (undated) DEVICE — 3M™ STERI-DRAPE™ INSTRUMENT POUCH 1018: Brand: STERI-DRAPE™

## (undated) DEVICE — DRILL BIT NON-LOCKING, SHORT: Brand: AXSOS

## (undated) DEVICE — CORTEX SCREW
Type: IMPLANTABLE DEVICE | Site: HUMERUS | Status: NON-FUNCTIONAL
Brand: AXSOS
Removed: 2021-02-10

## (undated) DEVICE — GAUZE,SPONGE,FLUFF,6"X6.75",STRL,5/TRAY: Brand: MEDLINE